# Patient Record
Sex: FEMALE | Race: WHITE | Employment: FULL TIME | URBAN - METROPOLITAN AREA
[De-identification: names, ages, dates, MRNs, and addresses within clinical notes are randomized per-mention and may not be internally consistent; named-entity substitution may affect disease eponyms.]

---

## 2017-06-18 PROBLEM — K21.9 GASTROESOPHAGEAL REFLUX DISEASE WITHOUT ESOPHAGITIS: Status: ACTIVE | Noted: 2017-06-18

## 2017-06-18 PROBLEM — E78.2 MIXED HYPERLIPIDEMIA: Status: ACTIVE | Noted: 2017-06-18

## 2017-06-18 PROBLEM — R53.83 FATIGUE: Status: ACTIVE | Noted: 2017-06-18

## 2018-03-06 PROBLEM — L98.9 NON-HEALING SKIN LESION OF NOSE: Status: ACTIVE | Noted: 2018-03-06

## 2018-03-19 ENCOUNTER — TELEPHONE (OUTPATIENT)
Dept: FAMILY MEDICINE CLINIC | Age: 53
End: 2018-03-19

## 2018-03-27 ENCOUNTER — OFFICE VISIT (OUTPATIENT)
Dept: FAMILY MEDICINE CLINIC | Age: 53
End: 2018-03-27
Payer: COMMERCIAL

## 2018-03-27 VITALS
BODY MASS INDEX: 32.65 KG/M2 | TEMPERATURE: 99.7 F | RESPIRATION RATE: 18 BRPM | SYSTOLIC BLOOD PRESSURE: 118 MMHG | HEIGHT: 65 IN | WEIGHT: 196 LBS | OXYGEN SATURATION: 100 % | HEART RATE: 66 BPM | DIASTOLIC BLOOD PRESSURE: 80 MMHG

## 2018-03-27 DIAGNOSIS — J11.1 FLU SYNDROME: ICD-10-CM

## 2018-03-27 DIAGNOSIS — E78.5 DYSLIPIDEMIA: ICD-10-CM

## 2018-03-27 DIAGNOSIS — Z12.31 SCREENING MAMMOGRAM, ENCOUNTER FOR: ICD-10-CM

## 2018-03-27 DIAGNOSIS — R53.83 FATIGUE, UNSPECIFIED TYPE: ICD-10-CM

## 2018-03-27 DIAGNOSIS — R73.01 IFG (IMPAIRED FASTING GLUCOSE): ICD-10-CM

## 2018-03-27 DIAGNOSIS — J40 BRONCHITIS: Primary | ICD-10-CM

## 2018-03-27 PROCEDURE — 99213 OFFICE O/P EST LOW 20 MIN: CPT | Performed by: FAMILY MEDICINE

## 2018-03-27 PROCEDURE — 96372 THER/PROPH/DIAG INJ SC/IM: CPT | Performed by: FAMILY MEDICINE

## 2018-03-27 RX ORDER — GUAIFENESIN 600 MG/1
600 TABLET, EXTENDED RELEASE ORAL 2 TIMES DAILY
Qty: 100 TABLET | Refills: 3 | Status: ON HOLD | OUTPATIENT
Start: 2018-03-27 | End: 2018-04-11 | Stop reason: HOSPADM

## 2018-03-27 RX ORDER — METHYLPREDNISOLONE 4 MG/1
TABLET ORAL
Qty: 1 KIT | Refills: 0 | Status: SHIPPED | OUTPATIENT
Start: 2018-03-27 | End: 2018-04-02

## 2018-03-27 RX ORDER — FLUTICASONE PROPIONATE 50 MCG
1 SPRAY, SUSPENSION (ML) NASAL DAILY
Qty: 1 BOTTLE | Refills: 3 | Status: SHIPPED | OUTPATIENT
Start: 2018-03-27 | End: 2018-12-04 | Stop reason: ALTCHOICE

## 2018-03-27 RX ORDER — DEXAMETHASONE SODIUM PHOSPHATE 4 MG/ML
4 INJECTION, SOLUTION INTRA-ARTICULAR; INTRALESIONAL; INTRAMUSCULAR; INTRAVENOUS; SOFT TISSUE ONCE
Status: COMPLETED | OUTPATIENT
Start: 2018-03-27 | End: 2018-03-27

## 2018-03-27 RX ADMIN — DEXAMETHASONE SODIUM PHOSPHATE 4 MG: 4 INJECTION, SOLUTION INTRA-ARTICULAR; INTRALESIONAL; INTRAMUSCULAR; INTRAVENOUS; SOFT TISSUE at 14:01

## 2018-03-27 ASSESSMENT — ENCOUNTER SYMPTOMS
EYES NEGATIVE: 1
CONSTIPATION: 0
BACK PAIN: 1
EYE PAIN: 0
HEARTBURN: 0
BLURRED VISION: 0
ORTHOPNEA: 0
SHORTNESS OF BREATH: 0
STRIDOR: 0
SPUTUM PRODUCTION: 0
PHOTOPHOBIA: 0
BLOOD IN STOOL: 0
HEMOPTYSIS: 0
EYE REDNESS: 0
DOUBLE VISION: 0
EYE DISCHARGE: 0
GASTROINTESTINAL NEGATIVE: 1

## 2018-03-27 NOTE — PROGRESS NOTES
Dao Stern is a 45 y.o. female. HPI/Chief C/O:  Chief Complaint   Patient presents with    URI     Pt c/o sinus pressure, headaches, body aches, chills, fatigue, denies cough     Allergies   Allergen Reactions    Penicillins Anaphylaxis    Dilaudid [Hydromorphone Hcl] Nausea And Vomiting    Demerol Hcl [Meperidine] Hives    Morphine      Can take alone, not with demerol    Tylox [Oxycodone-Acetaminophen] Hives    Sulfa Antibiotics Rash    Tape Daphnie Maya Tape] Rash   She is here with a severe sinus and headache issue      Sinusitis   This is a new problem. The current episode started in the past 7 days. The problem has been gradually worsening since onset. Associated symptoms include congestion, coughing, ear pain, sinus pressure, sneezing and a sore throat. Pertinent negatives include no chills, diaphoresis, headaches, hoarse voice, neck pain, shortness of breath or swollen glands. Knee Pain    The incident occurred more than 1 week ago. The pain is present in the left knee and right knee. The quality of the pain is described as shooting and stabbing. The pain is at a severity of 9/10. The pain is severe. The pain has been worsening since onset. Associated symptoms include a loss of motion, muscle weakness, numbness and tingling. Pertinent negatives include no inability to bear weight or loss of sensation. Back Pain   This is a chronic problem. The current episode started more than 1 year ago. The problem occurs constantly. The problem has been gradually worsening since onset. The pain is present in the lumbar spine. The quality of the pain is described as burning and aching. The pain is at a severity of 8/10. The pain is moderate. The pain is the same all the time. Stiffness is present all day. Associated symptoms include numbness, tingling and weakness. Pertinent negatives include no fever, headaches, leg pain, perianal numbness or weight loss.          ROS:  Review of Systems Constitutional: Positive for malaise/fatigue. Negative for chills, diaphoresis, fever and weight loss. HENT: Positive for congestion, ear pain, sinus pressure, sneezing and sore throat. Negative for ear discharge, hearing loss, hoarse voice, nosebleeds and tinnitus. Eyes: Negative. Negative for blurred vision, double vision, photophobia, pain, discharge and redness. Respiratory: Positive for cough. Negative for hemoptysis, sputum production, shortness of breath and stridor. Cardiovascular: Negative. Negative for palpitations, orthopnea, claudication, leg swelling and PND. Gastrointestinal: Negative. Negative for blood in stool, constipation, heartburn and melena. Genitourinary: Negative. Negative for flank pain, frequency, hematuria and urgency. Musculoskeletal: Positive for back pain and myalgias. Negative for falls and neck pain. Skin: Negative for itching. Lesion on her nose that will not go away    Neurological: Positive for tingling, weakness and numbness. Negative for dizziness, tremors, sensory change, speech change, focal weakness, seizures, loss of consciousness and headaches. Endo/Heme/Allergies: Negative. Negative for environmental allergies and polydipsia. Does not bruise/bleed easily. Psychiatric/Behavioral: Positive for depression. Negative for hallucinations, memory loss, substance abuse and suicidal ideas. The patient is not nervous/anxious and does not have insomnia.       Past Medical/Surgical Hx;  Reviewed with patient      Diagnosis Date    Arthritis     knee    Asthma     Cancer ([facility] [address] 07/2007    ovarian    H/O cardiovascular stress test 06/13/2017    lexiscan    Hiatal hernia with gastroesophageal reflux     Hx of cardiovascular stress test 10/2012    lexiscan nuclear stress    Hyperlipidemia     Hypoactive thyroid     no meds    Osteoarthritis     PONV (postoperative nausea and vomiting)     Preoperative clearance 01/09/2017    Medical clearance from Dr. Barajas  in EPIC notes    Ulcer of gastroesophageal junction      Past Surgical History:   Procedure Laterality Date    CHOLECYSTECTOMY      COLONOSCOPY      FRACTURE SURGERY Right 2005    humerus, hardware in place   [address]  7/18/07    and bso    KNEE ARTHROPLASTY Right 02/02/2017    KNEE ARTHROSCOPY  right    NERVE BLOCK N/A 5 15 13    lumb ep #1    NERVE BLOCK N/A 5/29/13    lumbar epidural nerve block #2    NERVE BLOCK  06 12 2013    lumbar epidural #3    TOTAL KNEE ARTHROPLASTY Right     TUBAL LIGATION      UPPER GASTROINTESTINAL ENDOSCOPY         Past Family Hx:  Reviewed with patient      Problem Relation Age of Onset    Emphysema Mother     Migraines Mother     Cancer Mother     Arthritis Other     Cancer Other     Heart Disease Other        Social Hx:  Reviewed with patient  Social History   Substance Use Topics    Smoking status: Former Smoker    Smokeless tobacco: Never Used      Comment: quit many years ago    Alcohol use 1.2 oz/week     2 Glasses of wine per week      Comment: social       OBJECTIVE  /80   Pulse 66   Temp 99.7 °F (37.6 °C) (Oral)   Resp 18   Ht 5' 5\" (1.651 m)   Wt 196 lb (88.9 kg)   LMP  (LMP Unknown)   SpO2 100%   Breastfeeding? No   BMI 32.62 kg/m²     Problem List:  Bryan Montes  does not have any pertinent problems on file. PHYS EX:  Physical Exam   Constitutional: She is oriented to person, place, and time. She appears well-developed and well-nourished. No distress. HENT:   Head: Normocephalic and atraumatic. Right Ear: External ear normal.   Nose: Nose normal.   Mouth/Throat: Oropharynx is clear and moist. No oropharyngeal exudate. Dull left TM  Small red lesion to the bridge of her nose that will not go away   Sinus congestion    Eyes: Conjunctivae and EOM are normal. Pupils are equal, round, and reactive to light. Right eye exhibits no discharge.  Left eye directed  -     guaiFENesin (MUCINEX) 600 MG extended release tablet; Take 1 tablet by mouth 2 times daily  -  PLAN--aerosol accuneb 1.25 plus chest percussion--Rx    Screening mammogram, encounter for  -     [facility] DIGITAL SCREEN W CAD BILATERAL; Future    IFG (impaired fasting glucose)  -     Comprehensive Metabolic Panel; Future  -     CBC Auto Differential; Future  -     Hemoglobin A1C; Future    Dyslipidemia  -     Comprehensive Metabolic Panel; Future  -     Lipid Panel; Future  -     CBC Auto Differential; Future    Fatigue, unspecified type  -     TSH without Reflex; Future    Flu syndrome  -     dexamethasone (DECADRON) injection 4 mg; Inject 1 mL into the muscle once  -     methylPREDNISolone (MEDROL DOSEPACK) 4 MG tablet; Take as directed  -     fluticasone (FLONASE) 50 MCG/ACT nasal spray; 1 spray by Nasal route daily  -     guaiFENesin (MUCINEX) 600 MG extended release tablet; Take 1 tablet by mouth 2 times daily        Outpatient Encounter Prescriptions as of 3/27/2018   Medication Sig Dispense Refill    methylPREDNISolone (MEDROL DOSEPACK) 4 MG tablet Take as directed 1 kit 0    fluticasone (FLONASE) 50 MCG/ACT nasal spray 1 spray by Nasal route daily 1 Bottle 3    guaiFENesin (MUCINEX) 600 MG extended release tablet Take 1 tablet by mouth 2 times daily 100 tablet 3    HYDROcodone-acetaminophen (NORCO)  MG per tablet Take 1 tablet by mouth 2 times daily as needed for Pain for up to 30 days.  Earliest Fill Date: 3/21/18 60 tablet 0    meloxicam (MOBIC) 7.5 MG tablet Take 1 tablet by mouth 2 times daily 60 tablet 5    pantoprazole (PROTONIX) 40 MG tablet TAKE 1 TABLET BY MOUTH EVERY DAY 90 tablet 1    rosuvastatin (CRESTOR) 5 MG tablet TAKE 1 TABLET BY MOUTH EVERY DAY 90 tablet 1    montelukast (SINGULAIR) 10 MG tablet TAKE 1 TABLET BY MOUTH EVERY DAY 90 tablet 1    guaiFENesin (MUCINEX) 600 MG extended release tablet Take 1 tablet by mouth 2 times daily 100 tablet 3    metoclopramide

## 2018-03-27 NOTE — PATIENT INSTRUCTIONS
Patient Education        Saline Nasal Washes: Care Instructions  Your Care Instructions  Saline nasal washes help keep the nasal passages open by washing out thick or dried mucus. This simple remedy can help relieve symptoms of allergies, sinusitis, and colds. It also can make the nose feel more comfortable by keeping the mucous membranes moist. You may notice a little burning sensation in your nose the first few times you use the solution, but this usually gets better in a few days. Follow-up care is a key part of your treatment and safety. Be sure to make and go to all appointments, and call your doctor if you are having problems. It's also a good idea to know your test results and keep a list of the medicines you take. How can you care for yourself at home? · You can buy premixed saline solution in a squeeze bottle or other sinus rinse products at a drugstore. Read and follow the instructions on the label. · You also can make your own saline solution by adding 1 teaspoon of salt and 1 teaspoon of baking soda to 2 cups of distilled water. · If you use a homemade solution, pour a small amount into a clean bowl. Using a rubber bulb syringe, squeeze the syringe and place the tip in the salt water. Pull a small amount of the salt water into the syringe by relaxing your hand. · Sit down with your head tilted slightly back. Do not lie down. Put the tip of the bulb syringe or the squeeze bottle a little way into one of your nostrils. Gently drip or squirt a few drops into the nostril. Repeat with the other nostril. Some sneezing and gagging are normal at first.  · Gently blow your nose. · Wipe the syringe or bottle tip clean after each use. · Repeat this 2 or 3 times a day. · Use nasal washes gently if you have nosebleeds often. When should you call for help? Watch closely for changes in your health, and be sure to contact your doctor if:  ? · You often get nosebleeds.    ? · You have problems doing the nasal washes. Where can you learn more? Go to [email or web address]://[email or web address].[email or web address]. org and sign in to your [employer] account. Enter [phone] in the [facility] box to learn more about \"Saline Nasal Washes: Care Instructions. \"     If you do not have an account, please click on the \"Sign Up Now\" link. Current as of: May 12, 2017  Content Version: 11.5  © [id number] [employer]. Care instructions adapted under license by [facility] ([facility]). If you have questions about a medical condition or this instruction, always ask your healthcare professional. [address] any warranty or liability for your use of this information. Patient Education        Sinusitis: Care Instructions  Your Care Instructions    Sinusitis is an infection of the lining of the sinus cavities in your head. Sinusitis often follows a cold. It causes pain and pressure in your head and face. In most cases, sinusitis gets better on its own in 1 to 2 weeks. But some mild symptoms may last for several weeks. Sometimes antibiotics are needed. Follow-up care is a key part of your treatment and safety. Be sure to make and go to all appointments, and call your doctor if you are having problems. It's also a good idea to know your test results and keep a list of the medicines you take. How can you care for yourself at home? · Take an over-the-counter pain medicine, such as acetaminophen (Tylenol), ibuprofen (Advil, Motrin), or naproxen (Aleve). Read and follow all instructions on the label. · If the doctor prescribed antibiotics, take them as directed. Do not stop taking them just because you feel better. You need to take the full course of antibiotics. · Be careful when taking over-the-counter cold or flu medicines and Tylenol at the same time. Many of these medicines have acetaminophen, which is Tylenol. Read the labels to make sure that you are not taking more than the recommended dose.  Too much acetaminophen

## 2018-03-28 ASSESSMENT — ENCOUNTER SYMPTOMS
SWOLLEN GLANDS: 0
SINUS PRESSURE: 1
SORE THROAT: 1
COUGH: 1
HOARSE VOICE: 0

## 2018-04-09 ENCOUNTER — APPOINTMENT (OUTPATIENT)
Dept: ULTRASOUND IMAGING | Age: 53
End: 2018-04-09
Payer: COMMERCIAL

## 2018-04-09 ENCOUNTER — APPOINTMENT (OUTPATIENT)
Dept: CT IMAGING | Age: 53
End: 2018-04-09
Payer: COMMERCIAL

## 2018-04-09 ENCOUNTER — HOSPITAL ENCOUNTER (OUTPATIENT)
Age: 53
Setting detail: OBSERVATION
Discharge: HOME | End: 2018-04-11
Attending: EMERGENCY MEDICINE | Admitting: INTERNAL MEDICINE
Payer: COMMERCIAL

## 2018-04-09 ENCOUNTER — APPOINTMENT (OUTPATIENT)
Dept: GENERAL RADIOLOGY | Age: 53
End: 2018-04-09
Payer: COMMERCIAL

## 2018-04-09 DIAGNOSIS — R07.9 CHEST PAIN, UNSPECIFIED TYPE: Primary | ICD-10-CM

## 2018-04-09 LAB
ALBUMIN SERPL-MCNC: 4.3 G/DL (ref 3.5–5.2)
ALP BLD-CCNC: 66 U/L (ref 35–104)
ALT SERPL-CCNC: 8 U/L (ref 0–32)
ANION GAP SERPL CALCULATED.3IONS-SCNC: 11 MMOL/L (ref 7–16)
AST SERPL-CCNC: 17 U/L (ref 0–31)
BASOPHILS ABSOLUTE: 0.02 E9/L (ref 0–0.2)
BASOPHILS RELATIVE PERCENT: 0.6 % (ref 0–2)
BILIRUB SERPL-MCNC: 0.4 MG/DL (ref 0–1.2)
BUN BLDV-MCNC: 9 MG/DL (ref 6–20)
CALCIUM SERPL-MCNC: 8.8 MG/DL (ref 8.6–10.2)
CHLORIDE BLD-SCNC: 101 MMOL/L (ref 98–107)
CO2: 29 MMOL/L (ref 22–29)
CREAT SERPL-MCNC: 1 MG/DL (ref 0.5–1)
D DIMER: 258 NG/ML DDU
EOSINOPHILS ABSOLUTE: 0.03 E9/L (ref 0.05–0.5)
EOSINOPHILS RELATIVE PERCENT: 0.8 % (ref 0–6)
GFR AFRICAN AMERICAN: >60
GFR NON-AFRICAN AMERICAN: 58 ML/MIN/1.73
GLUCOSE BLD-MCNC: 87 MG/DL (ref 74–109)
HCT VFR BLD CALC: 33.2 % (ref 34–48)
HEMOGLOBIN: 11.4 G/DL (ref 11.5–15.5)
IMMATURE GRANULOCYTES #: 0.01 E9/L
IMMATURE GRANULOCYTES %: 0.3 % (ref 0–5)
LYMPHOCYTES ABSOLUTE: 1.29 E9/L (ref 1.5–4)
LYMPHOCYTES RELATIVE PERCENT: 36.3 % (ref 20–42)
MCH RBC QN AUTO: 31 PG (ref 26–35)
MCHC RBC AUTO-ENTMCNC: 34.3 % (ref 32–34.5)
MCV RBC AUTO: 90.2 FL (ref 80–99.9)
MONOCYTES ABSOLUTE: 0.33 E9/L (ref 0.1–0.95)
MONOCYTES RELATIVE PERCENT: 9.3 % (ref 2–12)
NEUTROPHILS ABSOLUTE: 1.87 E9/L (ref 1.8–7.3)
NEUTROPHILS RELATIVE PERCENT: 52.7 % (ref 43–80)
PDW BLD-RTO: 13 FL (ref 11.5–15)
PLATELET # BLD: 148 E9/L (ref 130–450)
PMV BLD AUTO: 11.5 FL (ref 7–12)
POTASSIUM SERPL-SCNC: 4.1 MMOL/L (ref 3.5–5)
RBC # BLD: 3.68 E12/L (ref 3.5–5.5)
SODIUM BLD-SCNC: 141 MMOL/L (ref 132–146)
TOTAL PROTEIN: 6.9 G/DL (ref 6.4–8.3)
TROPONIN: <0.01 NG/ML (ref 0–0.03)
TROPONIN: <0.01 NG/ML (ref 0–0.03)
WBC # BLD: 3.6 E9/L (ref 4.5–11.5)

## 2018-04-09 PROCEDURE — 6370000000 HC RX 637 (ALT 250 FOR IP): Performed by: INTERNAL MEDICINE

## 2018-04-09 PROCEDURE — G0378 HOSPITAL OBSERVATION PER HR: HCPCS

## 2018-04-09 PROCEDURE — 84484 ASSAY OF TROPONIN QUANT: CPT

## 2018-04-09 PROCEDURE — 6360000004 HC RX CONTRAST MEDICATION: Performed by: RADIOLOGY

## 2018-04-09 PROCEDURE — 71275 CT ANGIOGRAPHY CHEST: CPT

## 2018-04-09 PROCEDURE — 93971 EXTREMITY STUDY: CPT

## 2018-04-09 PROCEDURE — 71045 X-RAY EXAM CHEST 1 VIEW: CPT

## 2018-04-09 PROCEDURE — 6370000000 HC RX 637 (ALT 250 FOR IP): Performed by: PHYSICIAN ASSISTANT

## 2018-04-09 PROCEDURE — 85025 COMPLETE CBC W/AUTO DIFF WBC: CPT

## 2018-04-09 PROCEDURE — 99285 EMERGENCY DEPT VISIT HI MDM: CPT

## 2018-04-09 PROCEDURE — 2580000003 HC RX 258: Performed by: PHYSICIAN ASSISTANT

## 2018-04-09 PROCEDURE — 93005 ELECTROCARDIOGRAM TRACING: CPT | Performed by: PHYSICIAN ASSISTANT

## 2018-04-09 PROCEDURE — 85378 FIBRIN DEGRADE SEMIQUANT: CPT

## 2018-04-09 PROCEDURE — 36415 COLL VENOUS BLD VENIPUNCTURE: CPT

## 2018-04-09 PROCEDURE — 80053 COMPREHEN METABOLIC PANEL: CPT

## 2018-04-09 RX ORDER — ASPIRIN 81 MG/1
81 TABLET, CHEWABLE ORAL 2 TIMES DAILY
Status: DISCONTINUED | OUTPATIENT
Start: 2018-04-09 | End: 2018-04-11 | Stop reason: HOSPADM

## 2018-04-09 RX ORDER — DOCUSATE SODIUM 100 MG/1
200 CAPSULE, LIQUID FILLED ORAL 2 TIMES DAILY
Status: DISCONTINUED | OUTPATIENT
Start: 2018-04-09 | End: 2018-04-11 | Stop reason: HOSPADM

## 2018-04-09 RX ORDER — PANTOPRAZOLE SODIUM 40 MG/1
40 TABLET, DELAYED RELEASE ORAL DAILY
Status: DISCONTINUED | OUTPATIENT
Start: 2018-04-10 | End: 2018-04-11 | Stop reason: HOSPADM

## 2018-04-09 RX ORDER — ASPIRIN 81 MG/1
243 TABLET, CHEWABLE ORAL ONCE
Status: COMPLETED | OUTPATIENT
Start: 2018-04-09 | End: 2018-04-09

## 2018-04-09 RX ORDER — METOCLOPRAMIDE 10 MG/1
10 TABLET ORAL DAILY
Status: DISCONTINUED | OUTPATIENT
Start: 2018-04-10 | End: 2018-04-11 | Stop reason: HOSPADM

## 2018-04-09 RX ORDER — TRAMADOL HYDROCHLORIDE 50 MG/1
100 TABLET ORAL 2 TIMES DAILY
Status: DISCONTINUED | OUTPATIENT
Start: 2018-04-09 | End: 2018-04-11 | Stop reason: HOSPADM

## 2018-04-09 RX ORDER — ATORVASTATIN CALCIUM 20 MG/1
20 TABLET, FILM COATED ORAL NIGHTLY
Status: DISCONTINUED | OUTPATIENT
Start: 2018-04-09 | End: 2018-04-11 | Stop reason: HOSPADM

## 2018-04-09 RX ORDER — GABAPENTIN 100 MG/1
200 CAPSULE ORAL NIGHTLY
Status: DISCONTINUED | OUTPATIENT
Start: 2018-04-09 | End: 2018-04-11 | Stop reason: HOSPADM

## 2018-04-09 RX ORDER — HYDROCODONE BITARTRATE AND ACETAMINOPHEN 10; 325 MG/1; MG/1
2 TABLET ORAL NIGHTLY
Status: DISCONTINUED | OUTPATIENT
Start: 2018-04-09 | End: 2018-04-11 | Stop reason: HOSPADM

## 2018-04-09 RX ORDER — ONDANSETRON 2 MG/ML
4 INJECTION INTRAMUSCULAR; INTRAVENOUS EVERY 6 HOURS PRN
Status: DISCONTINUED | OUTPATIENT
Start: 2018-04-09 | End: 2018-04-11 | Stop reason: HOSPADM

## 2018-04-09 RX ORDER — 0.9 % SODIUM CHLORIDE 0.9 %
500 INTRAVENOUS SOLUTION INTRAVENOUS ONCE
Status: COMPLETED | OUTPATIENT
Start: 2018-04-09 | End: 2018-04-09

## 2018-04-09 RX ADMIN — DOCUSATE SODIUM 200 MG: 100 CAPSULE, LIQUID FILLED ORAL at 21:41

## 2018-04-09 RX ADMIN — HYDROCODONE BITARTRATE AND ACETAMINOPHEN 2 TABLET: 10; 325 TABLET ORAL at 21:40

## 2018-04-09 RX ADMIN — SODIUM CHLORIDE 500 ML: 9 INJECTION, SOLUTION INTRAVENOUS at 15:25

## 2018-04-09 RX ADMIN — GABAPENTIN 200 MG: 100 CAPSULE ORAL at 21:41

## 2018-04-09 RX ADMIN — ASPIRIN 81 MG 81 MG: 81 TABLET ORAL at 21:42

## 2018-04-09 RX ADMIN — ASPIRIN 81 MG 243 MG: 81 TABLET ORAL at 14:40

## 2018-04-09 RX ADMIN — IOPAMIDOL 80 ML: 755 INJECTION, SOLUTION INTRAVENOUS at 15:56

## 2018-04-09 RX ADMIN — TRAMADOL HYDROCHLORIDE 100 MG: 50 TABLET, FILM COATED ORAL at 21:41

## 2018-04-09 RX ADMIN — NITROGLYCERIN 0.5 INCH: 20 OINTMENT TOPICAL at 15:25

## 2018-04-09 ASSESSMENT — PAIN DESCRIPTION - FREQUENCY
FREQUENCY: CONTINUOUS
FREQUENCY: CONTINUOUS

## 2018-04-09 ASSESSMENT — PAIN DESCRIPTION - LOCATION
LOCATION: BACK;LEG
LOCATION: CHEST

## 2018-04-09 ASSESSMENT — PAIN DESCRIPTION - PAIN TYPE
TYPE: CHRONIC PAIN
TYPE: ACUTE PAIN

## 2018-04-09 ASSESSMENT — PAIN DESCRIPTION - ORIENTATION: ORIENTATION: LOWER

## 2018-04-09 ASSESSMENT — PAIN SCALES - GENERAL
PAINLEVEL_OUTOF10: 6
PAINLEVEL_OUTOF10: 0
PAINLEVEL_OUTOF10: 6

## 2018-04-09 ASSESSMENT — PAIN DESCRIPTION - DESCRIPTORS
DESCRIPTORS: ACHING;DISCOMFORT;CONSTANT
DESCRIPTORS: PRESSURE

## 2018-04-10 LAB
ANION GAP SERPL CALCULATED.3IONS-SCNC: 11 MMOL/L (ref 7–16)
BASOPHILS ABSOLUTE: 0.03 E9/L (ref 0–0.2)
BASOPHILS RELATIVE PERCENT: 0.7 % (ref 0–2)
BUN BLDV-MCNC: 11 MG/DL (ref 6–20)
CALCIUM SERPL-MCNC: 8.9 MG/DL (ref 8.6–10.2)
CHLORIDE BLD-SCNC: 104 MMOL/L (ref 98–107)
CHOLESTEROL, TOTAL: 154 MG/DL (ref 0–199)
CO2: 28 MMOL/L (ref 22–29)
CREAT SERPL-MCNC: 1 MG/DL (ref 0.5–1)
EOSINOPHILS ABSOLUTE: 0.07 E9/L (ref 0.05–0.5)
EOSINOPHILS RELATIVE PERCENT: 1.7 % (ref 0–6)
GFR AFRICAN AMERICAN: >60
GFR NON-AFRICAN AMERICAN: 58 ML/MIN/1.73
GLUCOSE BLD-MCNC: 99 MG/DL (ref 74–109)
HCT VFR BLD CALC: 35.6 % (ref 34–48)
HDLC SERPL-MCNC: 66 MG/DL
HEMOGLOBIN: 11.9 G/DL (ref 11.5–15.5)
IMMATURE GRANULOCYTES #: 0.01 E9/L
IMMATURE GRANULOCYTES %: 0.2 % (ref 0–5)
LDL CHOLESTEROL CALCULATED: 67 MG/DL (ref 0–99)
LYMPHOCYTES ABSOLUTE: 1.26 E9/L (ref 1.5–4)
LYMPHOCYTES RELATIVE PERCENT: 31.1 % (ref 20–42)
MAGNESIUM: 2.4 MG/DL (ref 1.6–2.6)
MCH RBC QN AUTO: 30.9 PG (ref 26–35)
MCHC RBC AUTO-ENTMCNC: 33.4 % (ref 32–34.5)
MCV RBC AUTO: 92.5 FL (ref 80–99.9)
MONOCYTES ABSOLUTE: 0.39 E9/L (ref 0.1–0.95)
MONOCYTES RELATIVE PERCENT: 9.6 % (ref 2–12)
NEUTROPHILS ABSOLUTE: 2.29 E9/L (ref 1.8–7.3)
NEUTROPHILS RELATIVE PERCENT: 56.7 % (ref 43–80)
PDW BLD-RTO: 12.9 FL (ref 11.5–15)
PHOSPHORUS: 4.1 MG/DL (ref 2.5–4.5)
PLATELET # BLD: 163 E9/L (ref 130–450)
PMV BLD AUTO: 11.9 FL (ref 7–12)
POTASSIUM SERPL-SCNC: 4.3 MMOL/L (ref 3.5–5)
RBC # BLD: 3.85 E12/L (ref 3.5–5.5)
SODIUM BLD-SCNC: 143 MMOL/L (ref 132–146)
TRIGL SERPL-MCNC: 107 MG/DL (ref 0–149)
TROPONIN: <0.01 NG/ML (ref 0–0.03)
VLDLC SERPL CALC-MCNC: 21 MG/DL
WBC # BLD: 4.1 E9/L (ref 4.5–11.5)

## 2018-04-10 PROCEDURE — 6360000002 HC RX W HCPCS: Performed by: INTERNAL MEDICINE

## 2018-04-10 PROCEDURE — 83735 ASSAY OF MAGNESIUM: CPT

## 2018-04-10 PROCEDURE — 84100 ASSAY OF PHOSPHORUS: CPT

## 2018-04-10 PROCEDURE — 80061 LIPID PANEL: CPT

## 2018-04-10 PROCEDURE — 6370000000 HC RX 637 (ALT 250 FOR IP): Performed by: INTERNAL MEDICINE

## 2018-04-10 PROCEDURE — G0378 HOSPITAL OBSERVATION PER HR: HCPCS

## 2018-04-10 PROCEDURE — 96372 THER/PROPH/DIAG INJ SC/IM: CPT

## 2018-04-10 PROCEDURE — 84484 ASSAY OF TROPONIN QUANT: CPT

## 2018-04-10 PROCEDURE — 80048 BASIC METABOLIC PNL TOTAL CA: CPT

## 2018-04-10 PROCEDURE — 85025 COMPLETE CBC W/AUTO DIFF WBC: CPT

## 2018-04-10 PROCEDURE — 93005 ELECTROCARDIOGRAM TRACING: CPT | Performed by: INTERNAL MEDICINE

## 2018-04-10 PROCEDURE — 36415 COLL VENOUS BLD VENIPUNCTURE: CPT

## 2018-04-10 RX ORDER — ATENOLOL 25 MG/1
25 TABLET ORAL EVERY 8 HOURS
Status: DISPENSED | OUTPATIENT
Start: 2018-04-10 | End: 2018-04-11

## 2018-04-10 RX ADMIN — METOCLOPRAMIDE HYDROCHLORIDE 10 MG: 10 TABLET ORAL at 11:09

## 2018-04-10 RX ADMIN — DOCUSATE SODIUM 200 MG: 100 CAPSULE, LIQUID FILLED ORAL at 11:10

## 2018-04-10 RX ADMIN — ASPIRIN 81 MG 81 MG: 81 TABLET ORAL at 21:20

## 2018-04-10 RX ADMIN — DOCUSATE SODIUM 200 MG: 100 CAPSULE, LIQUID FILLED ORAL at 21:21

## 2018-04-10 RX ADMIN — HYDROCODONE BITARTRATE AND ACETAMINOPHEN 2 TABLET: 10; 325 TABLET ORAL at 21:20

## 2018-04-10 RX ADMIN — ATENOLOL 25 MG: 25 TABLET ORAL at 22:58

## 2018-04-10 RX ADMIN — ASPIRIN 81 MG 81 MG: 81 TABLET ORAL at 11:11

## 2018-04-10 RX ADMIN — GABAPENTIN 200 MG: 100 CAPSULE ORAL at 21:20

## 2018-04-10 RX ADMIN — PANTOPRAZOLE SODIUM 40 MG: 40 TABLET, DELAYED RELEASE ORAL at 11:08

## 2018-04-10 RX ADMIN — ATORVASTATIN CALCIUM 20 MG: 20 TABLET, FILM COATED ORAL at 21:20

## 2018-04-10 RX ADMIN — TRAMADOL HYDROCHLORIDE 100 MG: 50 TABLET, FILM COATED ORAL at 21:20

## 2018-04-10 RX ADMIN — ENOXAPARIN SODIUM 40 MG: 40 INJECTION SUBCUTANEOUS at 11:09

## 2018-04-10 RX ADMIN — TRAMADOL HYDROCHLORIDE 100 MG: 50 TABLET, FILM COATED ORAL at 11:07

## 2018-04-10 ASSESSMENT — PAIN DESCRIPTION - DESCRIPTORS: DESCRIPTORS: ACHING;DISCOMFORT;DULL

## 2018-04-10 ASSESSMENT — PAIN SCALES - GENERAL
PAINLEVEL_OUTOF10: 0
PAINLEVEL_OUTOF10: 6
PAINLEVEL_OUTOF10: 4
PAINLEVEL_OUTOF10: 0

## 2018-04-10 ASSESSMENT — ENCOUNTER SYMPTOMS
DIARRHEA: 0
SHORTNESS OF BREATH: 0
NAUSEA: 0
VOMITING: 0
COUGH: 0

## 2018-04-10 ASSESSMENT — PAIN DESCRIPTION - LOCATION: LOCATION: BACK

## 2018-04-10 ASSESSMENT — PAIN DESCRIPTION - ORIENTATION: ORIENTATION: LOWER

## 2018-04-10 ASSESSMENT — PAIN DESCRIPTION - ONSET: ONSET: GRADUAL

## 2018-04-11 ENCOUNTER — APPOINTMENT (OUTPATIENT)
Dept: CT IMAGING | Age: 53
End: 2018-04-11
Payer: COMMERCIAL

## 2018-04-11 VITALS
WEIGHT: 186.7 LBS | RESPIRATION RATE: 16 BRPM | TEMPERATURE: 97.6 F | HEART RATE: 60 BPM | DIASTOLIC BLOOD PRESSURE: 52 MMHG | HEIGHT: 65 IN | OXYGEN SATURATION: 96 % | BODY MASS INDEX: 31.11 KG/M2 | SYSTOLIC BLOOD PRESSURE: 102 MMHG

## 2018-04-11 LAB
ANION GAP SERPL CALCULATED.3IONS-SCNC: 10 MMOL/L (ref 7–16)
BASOPHILS ABSOLUTE: 0.02 E9/L (ref 0–0.2)
BASOPHILS RELATIVE PERCENT: 0.5 % (ref 0–2)
BUN BLDV-MCNC: 14 MG/DL (ref 6–20)
CALCIUM SERPL-MCNC: 8.9 MG/DL (ref 8.6–10.2)
CHLORIDE BLD-SCNC: 102 MMOL/L (ref 98–107)
CO2: 28 MMOL/L (ref 22–29)
CREAT SERPL-MCNC: 0.9 MG/DL (ref 0.5–1)
EOSINOPHILS ABSOLUTE: 0.08 E9/L (ref 0.05–0.5)
EOSINOPHILS RELATIVE PERCENT: 2.1 % (ref 0–6)
GFR AFRICAN AMERICAN: >60
GFR NON-AFRICAN AMERICAN: >60 ML/MIN/1.73
GLUCOSE BLD-MCNC: 96 MG/DL (ref 74–109)
HCT VFR BLD CALC: 36.1 % (ref 34–48)
HEMOGLOBIN: 12.1 G/DL (ref 11.5–15.5)
IMMATURE GRANULOCYTES #: 0.01 E9/L
IMMATURE GRANULOCYTES %: 0.3 % (ref 0–5)
LYMPHOCYTES ABSOLUTE: 1.34 E9/L (ref 1.5–4)
LYMPHOCYTES RELATIVE PERCENT: 35.6 % (ref 20–42)
MCH RBC QN AUTO: 30.7 PG (ref 26–35)
MCHC RBC AUTO-ENTMCNC: 33.5 % (ref 32–34.5)
MCV RBC AUTO: 91.6 FL (ref 80–99.9)
MONOCYTES ABSOLUTE: 0.38 E9/L (ref 0.1–0.95)
MONOCYTES RELATIVE PERCENT: 10.1 % (ref 2–12)
NEUTROPHILS ABSOLUTE: 1.93 E9/L (ref 1.8–7.3)
NEUTROPHILS RELATIVE PERCENT: 51.4 % (ref 43–80)
PDW BLD-RTO: 13 FL (ref 11.5–15)
PLATELET # BLD: 160 E9/L (ref 130–450)
PMV BLD AUTO: 12.2 FL (ref 7–12)
POTASSIUM SERPL-SCNC: 4.5 MMOL/L (ref 3.5–5)
RBC # BLD: 3.94 E12/L (ref 3.5–5.5)
SODIUM BLD-SCNC: 140 MMOL/L (ref 132–146)
WBC # BLD: 3.8 E9/L (ref 4.5–11.5)

## 2018-04-11 PROCEDURE — G0378 HOSPITAL OBSERVATION PER HR: HCPCS

## 2018-04-11 PROCEDURE — 6360000004 HC RX CONTRAST MEDICATION: Performed by: RADIOLOGY

## 2018-04-11 PROCEDURE — 80048 BASIC METABOLIC PNL TOTAL CA: CPT

## 2018-04-11 PROCEDURE — 85025 COMPLETE CBC W/AUTO DIFF WBC: CPT

## 2018-04-11 PROCEDURE — 36415 COLL VENOUS BLD VENIPUNCTURE: CPT

## 2018-04-11 PROCEDURE — 75574 CT ANGIO HRT W/3D IMAGE: CPT

## 2018-04-11 PROCEDURE — 6370000000 HC RX 637 (ALT 250 FOR IP): Performed by: INTERNAL MEDICINE

## 2018-04-11 RX ADMIN — METOCLOPRAMIDE HYDROCHLORIDE 10 MG: 10 TABLET ORAL at 13:40

## 2018-04-11 RX ADMIN — PANTOPRAZOLE SODIUM 40 MG: 40 TABLET, DELAYED RELEASE ORAL at 13:40

## 2018-04-11 RX ADMIN — IOPAMIDOL 100 ML: 755 INJECTION, SOLUTION INTRAVENOUS at 10:16

## 2018-04-11 RX ADMIN — ASPIRIN 81 MG 81 MG: 81 TABLET ORAL at 13:39

## 2018-04-11 RX ADMIN — TRAMADOL HYDROCHLORIDE 100 MG: 50 TABLET, FILM COATED ORAL at 13:41

## 2018-04-11 RX ADMIN — DOCUSATE SODIUM 200 MG: 100 CAPSULE, LIQUID FILLED ORAL at 13:39

## 2018-04-11 ASSESSMENT — PAIN DESCRIPTION - LOCATION: LOCATION: LEG

## 2018-04-11 ASSESSMENT — PAIN DESCRIPTION - DESCRIPTORS: DESCRIPTORS: ACHING;CONSTANT;DISCOMFORT

## 2018-04-11 ASSESSMENT — PAIN SCALES - GENERAL
PAINLEVEL_OUTOF10: 6
PAINLEVEL_OUTOF10: 4
PAINLEVEL_OUTOF10: 0

## 2018-04-11 ASSESSMENT — PAIN DESCRIPTION - ORIENTATION: ORIENTATION: RIGHT;LEFT

## 2018-04-11 ASSESSMENT — PAIN DESCRIPTION - PAIN TYPE: TYPE: CHRONIC PAIN

## 2018-04-12 ENCOUNTER — CARE COORDINATION (OUTPATIENT)
Dept: CASE MANAGEMENT | Age: 53
End: 2018-04-12

## 2018-04-12 DIAGNOSIS — R07.9 CHEST PAIN, UNSPECIFIED TYPE: Primary | ICD-10-CM

## 2018-04-12 LAB
EKG ATRIAL RATE: 76 BPM
EKG P AXIS: 62 DEGREES
EKG P-R INTERVAL: 162 MS
EKG Q-T INTERVAL: 384 MS
EKG QRS DURATION: 80 MS
EKG QTC CALCULATION (BAZETT): 432 MS
EKG R AXIS: 62 DEGREES
EKG T AXIS: 33 DEGREES
EKG VENTRICULAR RATE: 76 BPM

## 2018-04-12 PROCEDURE — 1111F DSCHRG MED/CURRENT MED MERGE: CPT | Performed by: FAMILY MEDICINE

## 2018-04-14 LAB
EKG ATRIAL RATE: 72 BPM
EKG P AXIS: 50 DEGREES
EKG P-R INTERVAL: 158 MS
EKG Q-T INTERVAL: 384 MS
EKG QRS DURATION: 80 MS
EKG QTC CALCULATION (BAZETT): 420 MS
EKG R AXIS: 48 DEGREES
EKG T AXIS: 9 DEGREES
EKG VENTRICULAR RATE: 72 BPM

## 2018-04-16 ENCOUNTER — OFFICE VISIT (OUTPATIENT)
Dept: FAMILY MEDICINE CLINIC | Age: 53
End: 2018-04-16
Payer: COMMERCIAL

## 2018-04-16 VITALS
HEART RATE: 74 BPM | HEIGHT: 65 IN | SYSTOLIC BLOOD PRESSURE: 126 MMHG | OXYGEN SATURATION: 98 % | BODY MASS INDEX: 30.56 KG/M2 | DIASTOLIC BLOOD PRESSURE: 78 MMHG | WEIGHT: 183.4 LBS

## 2018-04-16 DIAGNOSIS — K44.9 HIATAL HERNIA: Primary | ICD-10-CM

## 2018-04-16 DIAGNOSIS — M48.061 FORAMINAL STENOSIS OF LUMBAR REGION: ICD-10-CM

## 2018-04-16 DIAGNOSIS — K21.9 GASTROESOPHAGEAL REFLUX DISEASE WITHOUT ESOPHAGITIS: ICD-10-CM

## 2018-04-16 DIAGNOSIS — E78.5 HYPERLIPIDEMIA, UNSPECIFIED HYPERLIPIDEMIA TYPE: ICD-10-CM

## 2018-04-16 DIAGNOSIS — Z98.890 S/P LAMINECTOMY: ICD-10-CM

## 2018-04-16 PROCEDURE — 99213 OFFICE O/P EST LOW 20 MIN: CPT | Performed by: FAMILY MEDICINE

## 2018-04-16 ASSESSMENT — ENCOUNTER SYMPTOMS
BLURRED VISION: 0
STRIDOR: 0
DIARRHEA: 0
SINUS PAIN: 0
ABDOMINAL PAIN: 1
PHOTOPHOBIA: 0
WATER BRASH: 0
EYE DISCHARGE: 0
CHOKING: 0
HOARSE VOICE: 0
CONSTIPATION: 0
BACK PAIN: 1
EYES NEGATIVE: 1
GLOBUS SENSATION: 0
HEARTBURN: 0
DOUBLE VISION: 0
EYE REDNESS: 0
BELCHING: 0
WHEEZING: 0
NAUSEA: 0
SORE THROAT: 0
EYE PAIN: 0
BLOOD IN STOOL: 0
COUGH: 0

## 2018-04-16 ASSESSMENT — PATIENT HEALTH QUESTIONNAIRE - PHQ9
1. LITTLE INTEREST OR PLEASURE IN DOING THINGS: 1
SUM OF ALL RESPONSES TO PHQ9 QUESTIONS 1 & 2: 1
SUM OF ALL RESPONSES TO PHQ QUESTIONS 1-9: 1

## 2018-04-30 ENCOUNTER — CARE COORDINATION (OUTPATIENT)
Dept: CASE MANAGEMENT | Age: 53
End: 2018-04-30

## 2018-05-08 ENCOUNTER — TELEPHONE (OUTPATIENT)
Dept: FAMILY MEDICINE CLINIC | Age: 53
End: 2018-05-08

## 2018-06-01 ENCOUNTER — OFFICE VISIT (OUTPATIENT)
Dept: FAMILY MEDICINE CLINIC | Age: 53
End: 2018-06-01
Payer: COMMERCIAL

## 2018-06-01 VITALS
DIASTOLIC BLOOD PRESSURE: 76 MMHG | HEART RATE: 74 BPM | OXYGEN SATURATION: 98 % | BODY MASS INDEX: 30.49 KG/M2 | HEIGHT: 65 IN | WEIGHT: 183 LBS | SYSTOLIC BLOOD PRESSURE: 124 MMHG

## 2018-06-01 DIAGNOSIS — K44.9 HIATAL HERNIA WITH GASTROESOPHAGEAL REFLUX: ICD-10-CM

## 2018-06-01 DIAGNOSIS — K21.9 GASTROESOPHAGEAL REFLUX DISEASE WITHOUT ESOPHAGITIS: ICD-10-CM

## 2018-06-01 DIAGNOSIS — E78.5 HYPERLIPIDEMIA, UNSPECIFIED HYPERLIPIDEMIA TYPE: ICD-10-CM

## 2018-06-01 DIAGNOSIS — M48.061 NEURAL FORAMINAL STENOSIS OF LUMBAR SPINE: ICD-10-CM

## 2018-06-01 DIAGNOSIS — H66.002 ACUTE SUPPURATIVE OTITIS MEDIA OF LEFT EAR WITHOUT SPONTANEOUS RUPTURE OF TYMPANIC MEMBRANE, RECURRENCE NOT SPECIFIED: ICD-10-CM

## 2018-06-01 DIAGNOSIS — M51.36 DDD (DEGENERATIVE DISC DISEASE), LUMBAR: Primary | ICD-10-CM

## 2018-06-01 DIAGNOSIS — M17.0 PRIMARY OSTEOARTHRITIS OF BOTH KNEES: ICD-10-CM

## 2018-06-01 DIAGNOSIS — K21.9 HIATAL HERNIA WITH GASTROESOPHAGEAL REFLUX: ICD-10-CM

## 2018-06-01 DIAGNOSIS — M54.16 LUMBAR RADICULOPATHY: ICD-10-CM

## 2018-06-01 DIAGNOSIS — Z12.31 SCREENING MAMMOGRAM, ENCOUNTER FOR: ICD-10-CM

## 2018-06-01 PROCEDURE — 99214 OFFICE O/P EST MOD 30 MIN: CPT | Performed by: FAMILY MEDICINE

## 2018-06-01 RX ORDER — PANTOPRAZOLE SODIUM 40 MG/1
TABLET, DELAYED RELEASE ORAL
Qty: 90 TABLET | Refills: 1 | Status: SHIPPED | OUTPATIENT
Start: 2018-06-01 | End: 2019-03-06 | Stop reason: SDUPTHER

## 2018-06-01 RX ORDER — ROSUVASTATIN CALCIUM 5 MG/1
TABLET, COATED ORAL
Qty: 90 TABLET | Refills: 1 | Status: SHIPPED | OUTPATIENT
Start: 2018-06-01 | End: 2019-03-06 | Stop reason: SDUPTHER

## 2018-06-01 RX ORDER — METOCLOPRAMIDE 10 MG/1
TABLET ORAL
Qty: 90 TABLET | Refills: 1 | Status: SHIPPED | OUTPATIENT
Start: 2018-06-01 | End: 2019-03-06 | Stop reason: SDUPTHER

## 2018-06-01 RX ORDER — DULOXETIN HYDROCHLORIDE 30 MG/1
30 CAPSULE, DELAYED RELEASE ORAL DAILY
Qty: 30 CAPSULE | Refills: 3 | Status: SHIPPED | OUTPATIENT
Start: 2018-06-01 | End: 2018-12-04 | Stop reason: SDUPTHER

## 2018-06-01 RX ORDER — MONTELUKAST SODIUM 10 MG/1
TABLET ORAL
Qty: 90 TABLET | Refills: 1 | Status: SHIPPED
Start: 2018-06-01 | End: 2021-04-07 | Stop reason: SDUPTHER

## 2018-06-01 RX ORDER — GABAPENTIN 100 MG/1
200 CAPSULE ORAL NIGHTLY
Qty: 60 CAPSULE | Refills: 1 | Status: CANCELLED | OUTPATIENT
Start: 2018-06-01 | End: 2018-07-31

## 2018-06-01 RX ORDER — MELOXICAM 7.5 MG/1
7.5 TABLET ORAL 2 TIMES DAILY
Qty: 180 TABLET | Refills: 1 | Status: SHIPPED | OUTPATIENT
Start: 2018-06-01 | End: 2019-03-11 | Stop reason: ALTCHOICE

## 2018-06-01 ASSESSMENT — ENCOUNTER SYMPTOMS
EYE PAIN: 0
BACK PAIN: 1
VOMITING: 0
SORE THROAT: 0
HEMOPTYSIS: 0
HEARTBURN: 0
BLOOD IN STOOL: 0
SHORTNESS OF BREATH: 0
CONSTIPATION: 0
WHEEZING: 0
BLURRED VISION: 0
SINUS PAIN: 0
DOUBLE VISION: 0
DIARRHEA: 0
ABDOMINAL PAIN: 0
SPUTUM PRODUCTION: 0
STRIDOR: 0
NAUSEA: 0
COUGH: 0
EYE REDNESS: 0
EYES NEGATIVE: 1
PHOTOPHOBIA: 0
ORTHOPNEA: 0
EYE DISCHARGE: 0

## 2018-06-02 ASSESSMENT — ENCOUNTER SYMPTOMS
HOARSE VOICE: 0
CHOKING: 0
WATER BRASH: 0
STRIDOR: 0
BELCHING: 0
GLOBUS SENSATION: 0

## 2018-06-02 ASSESSMENT — PATIENT HEALTH QUESTIONNAIRE - PHQ9
1. LITTLE INTEREST OR PLEASURE IN DOING THINGS: 1
SUM OF ALL RESPONSES TO PHQ QUESTIONS 1-9: 2
2. FEELING DOWN, DEPRESSED OR HOPELESS: 1
SUM OF ALL RESPONSES TO PHQ9 QUESTIONS 1 & 2: 2

## 2018-06-06 ENCOUNTER — HOSPITAL ENCOUNTER (OUTPATIENT)
Dept: MAMMOGRAPHY | Age: 53
Discharge: HOME | End: 2018-06-08
Payer: COMMERCIAL

## 2018-06-06 DIAGNOSIS — Z12.31 SCREENING MAMMOGRAM, ENCOUNTER FOR: ICD-10-CM

## 2018-06-06 PROCEDURE — 77067 SCR MAMMO BI INCL CAD: CPT

## 2018-08-02 ENCOUNTER — TELEPHONE (OUTPATIENT)
Dept: FAMILY MEDICINE CLINIC | Age: 53
End: 2018-08-02

## 2018-08-06 ENCOUNTER — OFFICE VISIT (OUTPATIENT)
Dept: FAMILY MEDICINE CLINIC | Age: 53
End: 2018-08-06
Payer: COMMERCIAL

## 2018-08-06 ENCOUNTER — TELEPHONE (OUTPATIENT)
Dept: FAMILY MEDICINE CLINIC | Age: 53
End: 2018-08-06

## 2018-08-06 VITALS
RESPIRATION RATE: 16 BRPM | WEIGHT: 189 LBS | TEMPERATURE: 96.9 F | HEART RATE: 81 BPM | BODY MASS INDEX: 30.37 KG/M2 | SYSTOLIC BLOOD PRESSURE: 104 MMHG | HEIGHT: 66 IN | DIASTOLIC BLOOD PRESSURE: 66 MMHG | OXYGEN SATURATION: 94 %

## 2018-08-06 DIAGNOSIS — E78.2 MIXED HYPERLIPIDEMIA: ICD-10-CM

## 2018-08-06 DIAGNOSIS — I10 ESSENTIAL HYPERTENSION: Primary | ICD-10-CM

## 2018-08-06 DIAGNOSIS — Z12.11 COLON CANCER SCREENING: Primary | ICD-10-CM

## 2018-08-06 DIAGNOSIS — M51.36 DDD (DEGENERATIVE DISC DISEASE), LUMBAR: ICD-10-CM

## 2018-08-06 DIAGNOSIS — M51.36 DDD (DEGENERATIVE DISC DISEASE), LUMBAR: Primary | ICD-10-CM

## 2018-08-06 DIAGNOSIS — M17.12 PRIMARY OSTEOARTHRITIS OF LEFT KNEE: ICD-10-CM

## 2018-08-06 DIAGNOSIS — Z96.651 HISTORY OF TOTAL RIGHT KNEE REPLACEMENT (TKR): ICD-10-CM

## 2018-08-06 PROCEDURE — 99213 OFFICE O/P EST LOW 20 MIN: CPT | Performed by: FAMILY MEDICINE

## 2018-08-06 RX ORDER — TRAMADOL HYDROCHLORIDE 50 MG/1
50 TABLET ORAL EVERY 6 HOURS PRN
COMMUNITY
End: 2019-02-05 | Stop reason: SDUPTHER

## 2018-08-06 ASSESSMENT — ENCOUNTER SYMPTOMS
COUGH: 0
CONSTIPATION: 0
VOMITING: 0
BLURRED VISION: 0
SINUS PAIN: 0
EYES NEGATIVE: 1
BACK PAIN: 1
EYE PAIN: 0
HEARTBURN: 0
NAUSEA: 0
DIARRHEA: 0
HEMOPTYSIS: 0
WHEEZING: 0
SPUTUM PRODUCTION: 0
DOUBLE VISION: 0
ORTHOPNEA: 0
ABDOMINAL PAIN: 0
STRIDOR: 0
EYE DISCHARGE: 0
SORE THROAT: 0
PHOTOPHOBIA: 0
EYE REDNESS: 0
BLOOD IN STOOL: 0
SHORTNESS OF BREATH: 0

## 2018-08-06 NOTE — PROGRESS NOTES
Chele Ramos is a 52 y.o. female. HPI/Chief C/O:  Chief Complaint   Patient presents with    Surgery     9/10 pre surgery, 11/10 surgery. total knee on left knee     Allergies   Allergen Reactions    Penicillins Anaphylaxis    Dilaudid [Hydromorphone Hcl] Nausea And Vomiting    Demerol Hcl [Meperidine] Hives    Morphine      Can take alone, not with demerol    Tylox [Oxycodone-Acetaminophen] Hives    Sulfa Antibiotics Rash    Tape Jarome Perone Tape] Rash   She is here regarding a pre op for total left knee      Back Pain   This is a chronic problem. The current episode started more than 1 year ago. The problem occurs constantly. The problem has been gradually worsening since onset. The pain is present in the lumbar spine. The quality of the pain is described as burning and aching. The pain is at a severity of 8/10. The pain is moderate. The pain is the same all the time. Stiffness is present all day. Associated symptoms include numbness, tingling and weakness. Pertinent negatives include no abdominal pain, chest pain, dysuria, fever, headaches, leg pain, perianal numbness or weight loss. Knee Pain    The incident occurred more than 1 week ago. The pain is present in the left knee and right knee. The quality of the pain is described as shooting and stabbing. The pain is at a severity of 9/10. The pain is severe. The pain has been worsening since onset. Associated symptoms include a loss of motion, muscle weakness, numbness and tingling. Pertinent negatives include no inability to bear weight or loss of sensation. Gastroesophageal Reflux   She reports no abdominal pain, no belching, no chest pain, no choking, no coughing, no dysphagia, no early satiety, no globus sensation, no heartburn, no hoarse voice, no nausea, no sore throat, no stridor, no tooth decay, no water brash or no wheezing. This is a recurrent problem. The current episode started more than 1 year ago.  The problem occurs frequently. The problem has been gradually worsening. Associated symptoms include fatigue. Pertinent negatives include no anemia, melena, muscle weakness, orthopnea or weight loss. She has tried a PPI and a pro-motility drug for the symptoms. The treatment provided moderate relief. Past procedures include an EGD. Past procedures do not include an abdominal ultrasound, esophageal manometry, esophageal pH monitoring, H. pylori antibody titer or a UGI. Past invasive treatments do not include gastroplasty, gastroplication or reflux surgery. Hypertension   Associated symptoms include anxiety, malaise/fatigue and neck pain. Pertinent negatives include no blurred vision, chest pain, headaches, orthopnea, palpitations, peripheral edema, PND, shortness of breath or sweats. Agents associated with hypertension include NSAIDs. Risk factors for coronary artery disease include dyslipidemia, family history, post-menopausal state and obesity. Past treatments include lifestyle changes. The current treatment provides significant improvement. Compliance problems include psychosocial issues, exercise and diet. There is no history of angina, kidney disease, CAD/MI, CVA, heart failure, left ventricular hypertrophy, PVD or retinopathy. Identifiable causes of hypertension include a thyroid problem. There is no history of chronic renal disease, coarctation of the aorta, hyperaldosteronism, hypercortisolism, hyperparathyroidism, a hypertension causing med, pheochromocytoma, renovascular disease or sleep apnea. ROS:  Review of Systems   Constitutional: Positive for fatigue and malaise/fatigue. Negative for chills, diaphoresis, fever and weight loss. HENT: Negative for congestion, ear discharge, ear pain, hearing loss, hoarse voice, nosebleeds, sinus pain, sore throat and tinnitus. Eyes: Negative. Negative for blurred vision, double vision, photophobia, pain, discharge and redness.    Respiratory: Negative for cough, hemoptysis, sputum production, choking, shortness of breath, wheezing and stridor. Cardiovascular: Negative for chest pain, palpitations, orthopnea, claudication, leg swelling and PND. Gastrointestinal: Negative for abdominal pain, blood in stool, constipation, diarrhea, dysphagia, heartburn, melena, nausea and vomiting. Genitourinary: Negative. Negative for dysuria, flank pain, frequency, hematuria and urgency. Musculoskeletal: Positive for back pain, joint pain (she is pre op for a left knee replacement ), myalgias and neck pain. Negative for falls and muscle weakness. Skin: Negative for itching and rash. Neurological: Positive for tingling, sensory change, weakness and numbness. Negative for dizziness, tremors, speech change, focal weakness, seizures, loss of consciousness and headaches. Pain into her feet    Endo/Heme/Allergies: Negative. Negative for environmental allergies and polydipsia. Does not bruise/bleed easily. Psychiatric/Behavioral: Positive for depression. Negative for hallucinations, memory loss, substance abuse and suicidal ideas. The patient is not nervous/anxious and does not have insomnia.       Past Medical/Surgical Hx;  Reviewed with patient      Diagnosis Date    Arthritis     knee    Asthma     Cancer ([facility] [address] 07/2007    ovarian    H/O cardiovascular stress test 06/13/2017    lexiscan    Hiatal hernia with gastroesophageal reflux     Hx of cardiovascular stress test 10/2012    lexiscan nuclear stress    Hyperlipidemia     Hypoactive thyroid     no meds    Osteoarthritis     PONV (postoperative nausea and vomiting)     Preoperative clearance 01/09/2017    Medical clearance from Dr. Janes Galvan in EPIC notes    Ulcer of gastroesophageal junction      Past Surgical History:   Procedure Laterality Date    CHOLECYSTECTOMY      COLONOSCOPY      FRACTURE SURGERY Right 2005    humerus, hardware in place   [address] [facility] No murmur heard. Pulmonary/Chest: Effort normal. No stridor. No respiratory distress. She has no wheezes. She has no rales. She exhibits no tenderness. Abdominal: Soft. Bowel sounds are normal. She exhibits no distension and no mass. There is no tenderness. There is no rebound and no guarding. No hernia. Musculoskeletal: She exhibits tenderness. She exhibits no edema or deformity. Right knee: She exhibits decreased range of motion and bony tenderness. She exhibits no swelling, no effusion, no ecchymosis, no deformity, no laceration, no erythema, normal alignment, no LCL laxity, normal patellar mobility, normal meniscus and no MCL laxity. Tenderness found. Patellar tendon tenderness noted. No medial joint line, no lateral joint line, no MCL and no LCL tenderness noted. Lumbar back: She exhibits decreased range of motion, tenderness, bony tenderness, pain and spasm. She exhibits no swelling, no edema, no deformity, no laceration and normal pulse. Back:         Legs:  Severe left knee pain and limit to ROM   Lymphadenopathy:     She has no cervical adenopathy. Neurological: She is alert and oriented to person, place, and time. She has normal reflexes. She displays normal reflexes. A sensory deficit is present. No cranial nerve deficit. She exhibits normal muscle tone. Coordination normal.   Radicular pain into her feet   Skin: Skin is warm. No rash noted. She is not diaphoretic. No erythema. No pallor. Nursing note and vitals reviewed. ASSESSMENT/PLAN  Grecia was seen today for surgery.     Diagnoses and all orders for this visit:    Essential hypertension--controlled  -- CARDIAC--ASA, STATIN, ace, beta, hctz, ( ccb )    History of total right knee replacement (TKR)  --doing very well    Mixed hyperlipidemia  --mediterranean diet    DDD (degenerative disc disease), lumbar  --omt/ultra--Rx    Primary osteoarthritis of left knee  --omt/ultra--rx    DAVION's pre op score==0 ( low risk

## 2018-08-07 RX ORDER — DULOXETIN HYDROCHLORIDE 30 MG/1
30 CAPSULE, DELAYED RELEASE ORAL DAILY
Qty: 90 CAPSULE | Refills: 1 | Status: SHIPPED | OUTPATIENT
Start: 2018-08-07 | End: 2019-03-06 | Stop reason: SDUPTHER

## 2018-08-07 ASSESSMENT — PATIENT HEALTH QUESTIONNAIRE - PHQ9
2. FEELING DOWN, DEPRESSED OR HOPELESS: 1
SUM OF ALL RESPONSES TO PHQ QUESTIONS 1-9: 2
1. LITTLE INTEREST OR PLEASURE IN DOING THINGS: 1
SUM OF ALL RESPONSES TO PHQ9 QUESTIONS 1 & 2: 2

## 2018-08-07 ASSESSMENT — ENCOUNTER SYMPTOMS
STRIDOR: 0
BELCHING: 0
HOARSE VOICE: 0
GLOBUS SENSATION: 0
CHOKING: 0
WATER BRASH: 0

## 2018-09-13 ENCOUNTER — TELEPHONE (OUTPATIENT)
Dept: FAMILY MEDICINE CLINIC | Age: 53
End: 2018-09-13

## 2018-09-13 NOTE — TELEPHONE ENCOUNTER
MA left message for pt asking for a return phone call regarding FIT.     Electronically signed by Evgeny Avila on 9/13/18 at 10:10 AM

## 2019-03-06 ENCOUNTER — OFFICE VISIT (OUTPATIENT)
Dept: FAMILY MEDICINE CLINIC | Age: 54
End: 2019-03-06
Payer: COMMERCIAL

## 2019-03-06 ENCOUNTER — HOSPITAL ENCOUNTER (OUTPATIENT)
Age: 54
Discharge: HOME | End: 2019-03-08
Payer: COMMERCIAL

## 2019-03-06 VITALS
TEMPERATURE: 97.4 F | WEIGHT: 191.6 LBS | SYSTOLIC BLOOD PRESSURE: 118 MMHG | HEIGHT: 65 IN | DIASTOLIC BLOOD PRESSURE: 70 MMHG | RESPIRATION RATE: 18 BRPM | OXYGEN SATURATION: 96 % | HEART RATE: 88 BPM | BODY MASS INDEX: 31.92 KG/M2

## 2019-03-06 DIAGNOSIS — R73.01 IFG (IMPAIRED FASTING GLUCOSE): ICD-10-CM

## 2019-03-06 DIAGNOSIS — R53.83 FATIGUE, UNSPECIFIED TYPE: ICD-10-CM

## 2019-03-06 DIAGNOSIS — J01.90 ACUTE BACTERIAL SINUSITIS: Primary | ICD-10-CM

## 2019-03-06 DIAGNOSIS — B96.89 ACUTE BACTERIAL SINUSITIS: ICD-10-CM

## 2019-03-06 DIAGNOSIS — M51.36 DDD (DEGENERATIVE DISC DISEASE), LUMBAR: ICD-10-CM

## 2019-03-06 DIAGNOSIS — B96.89 ACUTE BACTERIAL SINUSITIS: Primary | ICD-10-CM

## 2019-03-06 DIAGNOSIS — E78.5 HYPERLIPIDEMIA, UNSPECIFIED HYPERLIPIDEMIA TYPE: ICD-10-CM

## 2019-03-06 DIAGNOSIS — E78.2 MIXED HYPERLIPIDEMIA: ICD-10-CM

## 2019-03-06 DIAGNOSIS — K44.9 HIATAL HERNIA WITH GASTROESOPHAGEAL REFLUX: ICD-10-CM

## 2019-03-06 DIAGNOSIS — M62.838 CERVICAL PARASPINAL MUSCLE SPASM: ICD-10-CM

## 2019-03-06 DIAGNOSIS — K21.9 GASTROESOPHAGEAL REFLUX DISEASE WITHOUT ESOPHAGITIS: ICD-10-CM

## 2019-03-06 DIAGNOSIS — K21.9 HIATAL HERNIA WITH GASTROESOPHAGEAL REFLUX: ICD-10-CM

## 2019-03-06 DIAGNOSIS — J01.90 ACUTE BACTERIAL SINUSITIS: ICD-10-CM

## 2019-03-06 LAB
ALBUMIN SERPL-MCNC: 4.6 G/DL (ref 3.5–5.2)
ALP BLD-CCNC: 85 U/L (ref 35–104)
ALT SERPL-CCNC: 6 U/L (ref 0–32)
ANION GAP SERPL CALCULATED.3IONS-SCNC: 14 MMOL/L (ref 7–16)
AST SERPL-CCNC: 18 U/L (ref 0–31)
BASOPHILS ABSOLUTE: 0.03 E9/L (ref 0–0.2)
BASOPHILS RELATIVE PERCENT: 0.9 % (ref 0–2)
BILIRUB SERPL-MCNC: 0.3 MG/DL (ref 0–1.2)
BUN BLDV-MCNC: 11 MG/DL (ref 6–20)
CALCIUM SERPL-MCNC: 9 MG/DL (ref 8.6–10.2)
CHLORIDE BLD-SCNC: 103 MMOL/L (ref 98–107)
CHOLESTEROL, TOTAL: 148 MG/DL (ref 0–199)
CO2: 24 MMOL/L (ref 22–29)
CREAT SERPL-MCNC: 1.1 MG/DL (ref 0.5–1)
EOSINOPHILS ABSOLUTE: 0.12 E9/L (ref 0.05–0.5)
EOSINOPHILS RELATIVE PERCENT: 3.8 % (ref 0–6)
GFR AFRICAN AMERICAN: >60
GFR NON-AFRICAN AMERICAN: 51 ML/MIN/1.73
GLUCOSE BLD-MCNC: 104 MG/DL (ref 74–99)
HBA1C MFR BLD: 4.9 % (ref 4–5.6)
HCT VFR BLD CALC: 37.3 % (ref 34–48)
HDLC SERPL-MCNC: 51 MG/DL
HEMOGLOBIN: 11.9 G/DL (ref 11.5–15.5)
IMMATURE GRANULOCYTES #: 0.01 E9/L
IMMATURE GRANULOCYTES %: 0.3 % (ref 0–5)
LDL CHOLESTEROL CALCULATED: 72 MG/DL (ref 0–99)
LYMPHOCYTES ABSOLUTE: 0.92 E9/L (ref 1.5–4)
LYMPHOCYTES RELATIVE PERCENT: 29.1 % (ref 20–42)
MCH RBC QN AUTO: 28.8 PG (ref 26–35)
MCHC RBC AUTO-ENTMCNC: 31.9 % (ref 32–34.5)
MCV RBC AUTO: 90.3 FL (ref 80–99.9)
MONOCYTES ABSOLUTE: 0.4 E9/L (ref 0.1–0.95)
MONOCYTES RELATIVE PERCENT: 12.7 % (ref 2–12)
NEUTROPHILS ABSOLUTE: 1.68 E9/L (ref 1.8–7.3)
NEUTROPHILS RELATIVE PERCENT: 53.2 % (ref 43–80)
PDW BLD-RTO: 14.5 FL (ref 11.5–15)
PLATELET # BLD: 233 E9/L (ref 130–450)
PMV BLD AUTO: 12.5 FL (ref 7–12)
POTASSIUM SERPL-SCNC: 4.2 MMOL/L (ref 3.5–5)
RBC # BLD: 4.13 E12/L (ref 3.5–5.5)
SODIUM BLD-SCNC: 141 MMOL/L (ref 132–146)
TOTAL PROTEIN: 7.1 G/DL (ref 6.4–8.3)
TRIGL SERPL-MCNC: 126 MG/DL (ref 0–149)
TSH SERPL DL<=0.05 MIU/L-ACNC: 5.57 UIU/ML (ref 0.27–4.2)
VLDLC SERPL CALC-MCNC: 25 MG/DL
WBC # BLD: 3.2 E9/L (ref 4.5–11.5)

## 2019-03-06 PROCEDURE — 83036 HEMOGLOBIN GLYCOSYLATED A1C: CPT

## 2019-03-06 PROCEDURE — 85025 COMPLETE CBC W/AUTO DIFF WBC: CPT

## 2019-03-06 PROCEDURE — 80061 LIPID PANEL: CPT

## 2019-03-06 PROCEDURE — 84443 ASSAY THYROID STIM HORMONE: CPT

## 2019-03-06 PROCEDURE — 80053 COMPREHEN METABOLIC PANEL: CPT

## 2019-03-06 PROCEDURE — 99214 OFFICE O/P EST MOD 30 MIN: CPT | Performed by: FAMILY MEDICINE

## 2019-03-06 RX ORDER — PANTOPRAZOLE SODIUM 40 MG/1
TABLET, DELAYED RELEASE ORAL
Qty: 90 TABLET | Refills: 1 | Status: SHIPPED
Start: 2019-03-06 | End: 2021-04-07 | Stop reason: SDUPTHER

## 2019-03-06 RX ORDER — DULOXETIN HYDROCHLORIDE 30 MG/1
30 CAPSULE, DELAYED RELEASE ORAL DAILY
Qty: 90 CAPSULE | Refills: 1 | Status: SHIPPED
Start: 2019-03-06 | End: 2021-04-07

## 2019-03-06 RX ORDER — METOCLOPRAMIDE 10 MG/1
TABLET ORAL
Qty: 90 TABLET | Refills: 1 | Status: SHIPPED
Start: 2019-03-06 | End: 2021-04-07 | Stop reason: SDUPTHER

## 2019-03-06 RX ORDER — AZITHROMYCIN 250 MG/1
250 TABLET, FILM COATED ORAL SEE ADMIN INSTRUCTIONS
Qty: 6 TABLET | Refills: 0 | Status: SHIPPED | OUTPATIENT
Start: 2019-03-06 | End: 2019-03-11

## 2019-03-06 RX ORDER — ROSUVASTATIN CALCIUM 5 MG/1
TABLET, COATED ORAL
Qty: 90 TABLET | Refills: 1 | Status: SHIPPED
Start: 2019-03-06 | End: 2021-04-07 | Stop reason: SDUPTHER

## 2019-03-06 ASSESSMENT — PATIENT HEALTH QUESTIONNAIRE - PHQ9
2. FEELING DOWN, DEPRESSED OR HOPELESS: 0
1. LITTLE INTEREST OR PLEASURE IN DOING THINGS: 0
SUM OF ALL RESPONSES TO PHQ QUESTIONS 1-9: 0
SUM OF ALL RESPONSES TO PHQ QUESTIONS 1-9: 0
SUM OF ALL RESPONSES TO PHQ9 QUESTIONS 1 & 2: 0

## 2019-03-07 ASSESSMENT — ENCOUNTER SYMPTOMS
SINUS PAIN: 1
SHORTNESS OF BREATH: 0
EYES NEGATIVE: 1
ABDOMINAL DISTENTION: 0
NAUSEA: 0
ABDOMINAL PAIN: 0
BACK PAIN: 1
COUGH: 1
VOICE CHANGE: 0
CHEST TIGHTNESS: 0
COLOR CHANGE: 0
RHINORRHEA: 0
CONSTIPATION: 0
VOMITING: 0
EYE ITCHING: 0
FACIAL SWELLING: 0
SORE THROAT: 1
EYE PAIN: 0
CHOKING: 0
BLOOD IN STOOL: 0
ANAL BLEEDING: 0
SINUS PRESSURE: 1
RECTAL PAIN: 0
ALLERGIC/IMMUNOLOGIC NEGATIVE: 1
DIARRHEA: 0
EYE DISCHARGE: 0
PHOTOPHOBIA: 0
STRIDOR: 0
WHEEZING: 0
EYE REDNESS: 0
TROUBLE SWALLOWING: 0

## 2019-03-11 ENCOUNTER — TELEPHONE (OUTPATIENT)
Dept: FAMILY MEDICINE CLINIC | Age: 54
End: 2019-03-11

## 2019-03-11 DIAGNOSIS — N28.9 RENAL INSUFFICIENCY: Primary | ICD-10-CM

## 2019-05-02 ENCOUNTER — HOSPITAL ENCOUNTER (OUTPATIENT)
Age: 54
Discharge: HOME | End: 2019-05-04
Payer: COMMERCIAL

## 2019-05-02 LAB
ALCOHOL URINE: NOT DETECTED MG/DL
AMPHETAMINE SCREEN, URINE: NOT DETECTED
BARBITURATE SCREEN URINE: NOT DETECTED
BENZODIAZEPINE SCREEN, URINE: NOT DETECTED
CANNABINOID SCREEN URINE: NOT DETECTED
COCAINE METABOLITE SCREEN URINE: NOT DETECTED
METHADONE SCREEN, URINE: NOT DETECTED
OPIATE SCREEN URINE: NOT DETECTED
PHENCYCLIDINE SCREEN URINE: NOT DETECTED
PROPOXYPHENE SCREEN: NOT DETECTED

## 2019-05-02 PROCEDURE — G0480 DRUG TEST DEF 1-7 CLASSES: HCPCS

## 2019-05-02 PROCEDURE — 80307 DRUG TEST PRSMV CHEM ANLYZR: CPT

## 2019-05-09 LAB
6AM URINE: <10 NG/ML
CODEINE, URINE: <20 NG/ML
HYDROCODONE, URINE: <20 NG/ML
HYDROMORPHONE, URINE: <20 NG/ML
MORPHINE URINE: <20 NG/ML
NORHYDROCODONE, URINE: <20 NG/ML
NOROXYCODONE, URINE: <20 NG/ML
NOROXYMORPHONE, URINE: <20 NG/ML
OXYCODONE, URINE CONFIRMATION: <20 NG/ML
OXYMORPHONE, URINE: <20 NG/ML

## 2019-05-13 LAB
O-DESMETHYLTRAMADOL,UR: 7438 NG/ML
TRAMADOL, URINE: NORMAL NG/ML

## 2019-06-11 ENCOUNTER — HOSPITAL ENCOUNTER (OUTPATIENT)
Dept: MAMMOGRAPHY | Age: 54
Discharge: HOME | End: 2019-06-13
Payer: COMMERCIAL

## 2019-06-11 ENCOUNTER — NURSE ONLY (OUTPATIENT)
Dept: FAMILY MEDICINE CLINIC | Age: 54
End: 2019-06-11
Payer: COMMERCIAL

## 2019-06-11 ENCOUNTER — HOSPITAL ENCOUNTER (OUTPATIENT)
Age: 54
Discharge: HOME | End: 2019-06-13
Payer: COMMERCIAL

## 2019-06-11 DIAGNOSIS — N28.9 RENAL INSUFFICIENCY: Primary | ICD-10-CM

## 2019-06-11 DIAGNOSIS — N28.9 RENAL INSUFFICIENCY: ICD-10-CM

## 2019-06-11 DIAGNOSIS — Z12.31 VISIT FOR SCREENING MAMMOGRAM: ICD-10-CM

## 2019-06-11 LAB
ALBUMIN SERPL-MCNC: 4.5 G/DL (ref 3.5–5.2)
ALP BLD-CCNC: 87 U/L (ref 35–104)
ALT SERPL-CCNC: 7 U/L (ref 0–32)
ANION GAP SERPL CALCULATED.3IONS-SCNC: 13 MMOL/L (ref 7–16)
AST SERPL-CCNC: 20 U/L (ref 0–31)
BILIRUB SERPL-MCNC: 0.3 MG/DL (ref 0–1.2)
BUN BLDV-MCNC: 12 MG/DL (ref 6–20)
CALCIUM SERPL-MCNC: 9.6 MG/DL (ref 8.6–10.2)
CHLORIDE BLD-SCNC: 103 MMOL/L (ref 98–107)
CO2: 27 MMOL/L (ref 22–29)
CREAT SERPL-MCNC: 1.1 MG/DL (ref 0.5–1)
GFR AFRICAN AMERICAN: >60
GFR NON-AFRICAN AMERICAN: 51 ML/MIN/1.73
GLUCOSE BLD-MCNC: 96 MG/DL (ref 74–99)
POTASSIUM SERPL-SCNC: 4.9 MMOL/L (ref 3.5–5)
SODIUM BLD-SCNC: 143 MMOL/L (ref 132–146)
TOTAL PROTEIN: 7.4 G/DL (ref 6.4–8.3)

## 2019-06-11 PROCEDURE — 80053 COMPREHEN METABOLIC PANEL: CPT

## 2019-06-11 PROCEDURE — 36415 COLL VENOUS BLD VENIPUNCTURE: CPT | Performed by: FAMILY MEDICINE

## 2020-02-16 ENCOUNTER — HOSPITAL ENCOUNTER (EMERGENCY)
Age: 55
Discharge: HOME | End: 2020-02-16
Attending: EMERGENCY MEDICINE

## 2020-02-16 ENCOUNTER — APPOINTMENT (OUTPATIENT)
Dept: GENERAL RADIOLOGY | Age: 55
End: 2020-02-16

## 2020-02-16 VITALS
BODY MASS INDEX: 31.65 KG/M2 | HEIGHT: 65 IN | RESPIRATION RATE: 16 BRPM | DIASTOLIC BLOOD PRESSURE: 73 MMHG | OXYGEN SATURATION: 97 % | SYSTOLIC BLOOD PRESSURE: 130 MMHG | HEART RATE: 89 BPM | WEIGHT: 190 LBS | TEMPERATURE: 98.9 F

## 2020-02-16 LAB
ANION GAP SERPL CALCULATED.3IONS-SCNC: 13 MMOL/L (ref 7–16)
BASOPHILS ABSOLUTE: 0 E9/L (ref 0–0.2)
BASOPHILS RELATIVE PERCENT: 0.3 % (ref 0–2)
BUN BLDV-MCNC: 10 MG/DL (ref 6–20)
CALCIUM SERPL-MCNC: 9.5 MG/DL (ref 8.6–10.2)
CHLORIDE BLD-SCNC: 103 MMOL/L (ref 98–107)
CO2: 25 MMOL/L (ref 22–29)
CREAT SERPL-MCNC: 1 MG/DL (ref 0.5–1)
EOSINOPHILS ABSOLUTE: 0.07 E9/L (ref 0.05–0.5)
EOSINOPHILS RELATIVE PERCENT: 1.7 % (ref 0–6)
GFR AFRICAN AMERICAN: >60
GFR NON-AFRICAN AMERICAN: 57 ML/MIN/1.73
GLUCOSE BLD-MCNC: 111 MG/DL (ref 74–99)
HCT VFR BLD CALC: 34.7 % (ref 34–48)
HEMOGLOBIN: 11.5 G/DL (ref 11.5–15.5)
INFLUENZA A BY PCR: NOT DETECTED
INFLUENZA B BY PCR: NOT DETECTED
LYMPHOCYTES ABSOLUTE: 0.31 E9/L (ref 1.5–4)
LYMPHOCYTES RELATIVE PERCENT: 7.8 % (ref 20–42)
MCH RBC QN AUTO: 31.4 PG (ref 26–35)
MCHC RBC AUTO-ENTMCNC: 33.1 % (ref 32–34.5)
MCV RBC AUTO: 94.8 FL (ref 80–99.9)
MONOCYTES ABSOLUTE: 0.16 E9/L (ref 0.1–0.95)
MONOCYTES RELATIVE PERCENT: 3.5 % (ref 2–12)
NEUTROPHILS ABSOLUTE: 3.39 E9/L (ref 1.8–7.3)
NEUTROPHILS RELATIVE PERCENT: 87 % (ref 43–80)
OVALOCYTES: ABNORMAL
PDW BLD-RTO: 13.8 FL (ref 11.5–15)
PLATELET # BLD: 186 E9/L (ref 130–450)
PMV BLD AUTO: 11.1 FL (ref 7–12)
POIKILOCYTES: ABNORMAL
POTASSIUM SERPL-SCNC: 4.4 MMOL/L (ref 3.5–5)
RBC # BLD: 3.66 E12/L (ref 3.5–5.5)
SODIUM BLD-SCNC: 141 MMOL/L (ref 132–146)
WBC # BLD: 3.9 E9/L (ref 4.5–11.5)

## 2020-02-16 PROCEDURE — 6370000000 HC RX 637 (ALT 250 FOR IP): Performed by: STUDENT IN AN ORGANIZED HEALTH CARE EDUCATION/TRAINING PROGRAM

## 2020-02-16 PROCEDURE — 2580000003 HC RX 258: Performed by: STUDENT IN AN ORGANIZED HEALTH CARE EDUCATION/TRAINING PROGRAM

## 2020-02-16 PROCEDURE — 80048 BASIC METABOLIC PNL TOTAL CA: CPT

## 2020-02-16 PROCEDURE — 99283 EMERGENCY DEPT VISIT LOW MDM: CPT

## 2020-02-16 PROCEDURE — 36415 COLL VENOUS BLD VENIPUNCTURE: CPT

## 2020-02-16 PROCEDURE — 71046 X-RAY EXAM CHEST 2 VIEWS: CPT

## 2020-02-16 PROCEDURE — 85025 COMPLETE CBC W/AUTO DIFF WBC: CPT

## 2020-02-16 PROCEDURE — 87502 INFLUENZA DNA AMP PROBE: CPT

## 2020-02-16 RX ORDER — BENZONATATE 100 MG/1
100 CAPSULE ORAL 3 TIMES DAILY PRN
Qty: 21 CAPSULE | Refills: 0 | Status: SHIPPED | OUTPATIENT
Start: 2020-02-16 | End: 2020-02-23

## 2020-02-16 RX ORDER — ALBUTEROL SULFATE 90 UG/1
2 AEROSOL, METERED RESPIRATORY (INHALATION) EVERY 6 HOURS PRN
Qty: 1 INHALER | Refills: 0 | Status: SHIPPED | OUTPATIENT
Start: 2020-02-16 | End: 2021-01-12 | Stop reason: ALTCHOICE

## 2020-02-16 RX ORDER — 0.9 % SODIUM CHLORIDE 0.9 %
1000 INTRAVENOUS SOLUTION INTRAVENOUS ONCE
Status: COMPLETED | OUTPATIENT
Start: 2020-02-16 | End: 2020-02-16

## 2020-02-16 RX ORDER — ACETAMINOPHEN 500 MG
1000 TABLET ORAL ONCE
Status: COMPLETED | OUTPATIENT
Start: 2020-02-16 | End: 2020-02-16

## 2020-02-16 RX ADMIN — SODIUM CHLORIDE 1000 ML: 9 INJECTION, SOLUTION INTRAVENOUS at 11:54

## 2020-02-16 RX ADMIN — ACETAMINOPHEN 1000 MG: 500 TABLET, FILM COATED ORAL at 11:54

## 2020-02-16 ASSESSMENT — ENCOUNTER SYMPTOMS
PHOTOPHOBIA: 0
COUGH: 1
BLOOD IN STOOL: 0
EYE PAIN: 0
CHEST TIGHTNESS: 0
ABDOMINAL PAIN: 0
SINUS PRESSURE: 0
NAUSEA: 0
ABDOMINAL DISTENTION: 0
BACK PAIN: 0
RHINORRHEA: 0
WHEEZING: 0
TROUBLE SWALLOWING: 0
SHORTNESS OF BREATH: 0
SORE THROAT: 0
EYE REDNESS: 0
DIARRHEA: 0
VOMITING: 0
CONSTIPATION: 0

## 2020-02-16 ASSESSMENT — PAIN SCALES - GENERAL: PAINLEVEL_OUTOF10: 0

## 2020-02-16 ASSESSMENT — PAIN DESCRIPTION - LOCATION: LOCATION: GENERALIZED

## 2020-02-16 NOTE — ED PROVIDER NOTES
Patient is a 57-year-old female who presents to ED for evaluation of cough. Patient notes that onset of symptoms was 1 day prior to arrival.  Patient states that she has had productive sputum over the past several hours. Patient has associated nasal congestion and generalized body aches. Patient denies shortness of breath, lower extremity swelling, hemoptysis, history of DVT/PE, recent immobilization, recent surgery, or history of cancer. Patient denies associated lightheadedness, chest pain, diaphoresis or palpitations. Patient states that she is up-to-date on influenza vaccine received her influenza vaccine this year. Patient notes potential sick contacts at work. Review of Systems   Constitutional: Negative for chills, diaphoresis, fatigue and fever. HENT: Positive for congestion. Negative for ear pain, rhinorrhea, sinus pressure, sneezing, sore throat and trouble swallowing. Eyes: Negative for photophobia, pain and redness. Respiratory: Positive for cough. Negative for chest tightness, shortness of breath and wheezing. Cardiovascular: Negative for chest pain and palpitations. Gastrointestinal: Negative for abdominal distention, abdominal pain, blood in stool, constipation, diarrhea, nausea and vomiting. Endocrine: Negative for polydipsia and polyuria. Genitourinary: Negative for difficulty urinating, dysuria, flank pain, hematuria and urgency. Musculoskeletal: Positive for myalgias. Negative for arthralgias, back pain and neck pain. Skin: Negative for rash and wound. Neurological: Negative for weakness, light-headedness, numbness and headaches. Psychiatric/Behavioral: Negative for agitation and confusion. The patient is not nervous/anxious and is not hyperactive. Physical Exam  Constitutional:       General: She is not in acute distress. Appearance: She is well-developed. She is not diaphoretic. HENT:      Head: Normocephalic and atraumatic.       Right Ear: External ear normal.      Left Ear: External ear normal.      Mouth/Throat:      Mouth: Mucous membranes are dry. Pharynx: No oropharyngeal exudate. Eyes:      General:         Right eye: No discharge. Left eye: No discharge. Conjunctiva/sclera: Conjunctivae normal.      Pupils: Pupils are equal, round, and reactive to light. Neck:      Musculoskeletal: Normal range of motion and neck supple. Thyroid: No thyromegaly. Cardiovascular:      Rate and Rhythm: Normal rate and regular rhythm. Heart sounds: Normal heart sounds. No murmur. Pulmonary:      Effort: Pulmonary effort is normal. No respiratory distress. Breath sounds: Normal breath sounds. No wheezing or rales. Comments: Lungs clear to auscultation bilaterally, no conversational dyspnea or accessory muscle use. Chest:      Chest wall: No tenderness. Abdominal:      General: There is no distension. Palpations: Abdomen is soft. There is no mass. Tenderness: There is no abdominal tenderness. There is no guarding or rebound. Musculoskeletal: Normal range of motion. General: No tenderness. Comments: No lower extremity swelling or calf tenderness palpation. Skin:     General: Skin is warm and dry. Findings: No rash. Neurological:      Mental Status: She is alert and oriented to person, place, and time. Psychiatric:         Behavior: Behavior normal.         Thought Content: Thought content normal.         Judgment: Judgment normal.          Procedures     MDM  Number of Diagnoses or Management Options  Acute upper respiratory infection:   Diagnosis management comments: Patient is a 69-year-old female who presented to ED for evaluation of 1 day history of cough, congestion, generalized body aches. On arrival to ED, physical exam as noted above. Patient initially tachycardic 114, febrile 100.9 °F.  Patient was administered Tylenol, IV fluids.   Labs reviewed--influenza negative, no leukocytosis, serum chemistry unremarkable. Chest x-ray with no acute cardiopulmonary process. Patient given supportive care instructions. On repeat evaluation, patient with resolution of tachycardia and fever. Patient noting improved symptoms after medication administration. Patient was also instructed to follow-up closely with primary care physician. Strict return instructions to ED were given patient prior to disposition. Patient with all questions answered prior to discharge and is agreeable to plan. ED Course as of Feb 16 1608   Sun Feb 16, 2020 1222   ATTENDING PROVIDER ATTESTATION:     I have personally performed and/or participated in the history, exam, medical decision making, and procedures and agree with all pertinent clinical information unless otherwise noted. I have also reviewed and agree with the past medical, family and social history unless otherwise noted. I have discussed this patient in detail with the resident and provided the instruction and education regarding the evidence-based evaluation and treatment of [unfilled]  History: patient reports a cough since starting work in October. 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 She states cough syrup does not help. Today she developed a fever, myalgias and congestion. No prior history of PE/DVT. My findings: Malgorzata Modi is a 62 y.o. female whom is in no distress. Physical exam reveals slightly dry mucous membranes. Heart rate is regular and fast. Lungs CTA. Abdomen is soft and nontender. No peripheral edema. My plan: Symptomatic and supportive care. Will evaluate for influenza and check a d-dimer.     Electronically signed by Anahi Khan DO on 2/16/20 at 12:22 PM          [JS]      ED Course User Index  [JS] Anahi Khan DO          ED Course as of Feb 16 1608   Sun Feb 16, 2020   1222   ATTENDING PROVIDER ATTESTATION:     I have personally performed and/or participated in the history, exam, medical decision making, and procedures and agree with all pertinent clinical information unless otherwise noted. I have also reviewed and agree with the past medical, family and social history unless otherwise noted. I have discussed this patient in detail with the resident and provided the instruction and education regarding the evidence-based evaluation and treatment of [unfilled]  History: patient reports a cough since starting work in October. 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 She states cough syrup does not help. Today she developed a fever, myalgias and congestion. No prior history of PE/DVT. My findings: Skyler Aranda is a 62 y.o. female whom is in no distress. Physical exam reveals slightly dry mucous membranes. Heart rate is regular and fast. Lungs CTA. Abdomen is soft and nontender. No peripheral edema. My plan: Symptomatic and supportive care. Will evaluate for influenza and check a d-dimer. Electronically signed by Yas Iraheta DO on 2/16/20 at 12:22 PM          [JS]      ED Course User Index  [JS] Yas Iraheta DO       --------------------------------------------- PAST HISTORY ---------------------------------------------  Past Medical History:  has a past medical history of Arthritis, Asthma, Cancer ([facility] [address] H/O cardiovascular stress test, Hiatal hernia with gastroesophageal reflux, Hx of cardiovascular stress test, Hyperlipidemia, Hypoactive thyroid, Osteoarthritis, PONV (postoperative nausea and vomiting), Preoperative clearance, and Ulcer of gastroesophageal junction. Past Surgical History:  has a past surgical history that includes Humerus Closed Reduction; Hemorrhoid surgery; Knee arthroscopy (right); Cholecystectomy; Colonoscopy; Upper gastrointestinal endoscopy; Nerve Block (N/A, 5 15 13); Nerve Block (N/A, 5/29/13);  Nerve Block (06 12 2013); Hysterectomy (7/18/07); Tubal ligation; fracture surgery (Right, 2005); Knee Arthroplasty (Right, 02/02/2017); Total knee arthroplasty (Right); lumbar laminectomy (05/23/2014); and joint replacement (Left, 11/2018). Social History:  reports that she quit smoking about 41 years ago. Her smoking use included cigarettes. She started smoking about 42 years ago. She has a 0.50 pack-year smoking history. She has never used smokeless tobacco. She reports current alcohol use of about 2.0 standard drinks of alcohol per week. She reports that she does not use drugs. Family History: family history includes Arthritis in an other family member; Cancer in her mother and another family member; Emphysema in her mother; Heart Disease in an other family member; Migraines in her mother. The patients home medications have been reviewed. Allergies: Penicillins; Dilaudid [hydromorphone hcl]; Demerol hcl [meperidine]; Morphine;  Tylox [oxycodone-acetaminophen]; Sulfa antibiotics; and Tape [adhesive tape]    -------------------------------------------------- RESULTS -------------------------------------------------  Labs:  Results for orders placed or performed during the hospital encounter of 02/16/20   Rapid influenza A/B antigens   Result Value Ref Range    Influenza A by PCR Not Detected Not Detected    Influenza B by PCR Not Detected Not Detected   CBC auto differential   Result Value Ref Range    WBC 3.9 (L) 4.5 - 11.5 E9/L    RBC 3.66 3.50 - 5.50 E12/L    Hemoglobin 11.5 11.5 - 15.5 g/dL    Hematocrit 34.7 34.0 - 48.0 %    MCV 94.8 80.0 - 99.9 fL    MCH 31.4 26.0 - 35.0 pg    MCHC 33.1 32.0 - 34.5 %    RDW 13.8 11.5 - 15.0 fL    Platelets 266 868 - 710 E9/L    MPV 11.1 7.0 - 12.0 fL    Neutrophils % 87.0 (H) 43.0 - 80.0 %    Lymphocytes % 7.8 (L) 20.0 - 42.0 %    Monocytes % 3.5 2.0 - 12.0 %    Eosinophils % 1.7 0.0 - 6.0 %    Basophils % 0.3 0.0 - 2.0 %    Neutrophils Absolute 3.39 1.80 - 7.30 E9/L    Lymphocytes Absolute 0.31 (L) 1.50 - 4.00 E9/L    Monocytes Absolute 0.16 0.10 - 0.95 E9/L    Eosinophils Absolute 0.07 0.05 - 0.50 E9/L    Basophils Absolute 0.00 0.00 - 0.20 E9/L    Poikilocytes 1+     Ovalocytes 1+    Basic Metabolic Panel   Result Value Ref Range    Sodium 141 132 - 146 mmol/L    Potassium 4.4 3.5 - 5.0 mmol/L    Chloride 103 98 - 107 mmol/L    CO2 25 22 - 29 mmol/L    Anion Gap 13 7 - 16 mmol/L    Glucose 111 (H) 74 - 99 mg/dL    BUN 10 6 - 20 mg/dL    CREATININE 1.0 0.5 - 1.0 mg/dL    GFR Non-African American 57 >=60 mL/min/1.73    GFR African American >60     Calcium 9.5 8.6 - 10.2 mg/dL       Radiology:  XR CHEST STANDARD (2 VW)   Final Result   1. No active cardiopulmonary disease.           ------------------------- NURSING NOTES AND VITALS REVIEWED ---------------------------  Date / Time Roomed:  2/16/2020 11:24 AM  ED Bed Assignment:  19/19    The nursing notes within the ED encounter and vital signs as below have been reviewed. /73   Pulse 89   Temp 98.9 °F (37.2 °C)   Resp 16   Ht 5' 5\" (1.651 m)   Wt 190 lb (86.2 kg)   LMP  (LMP Unknown)   SpO2 97%   BMI 31.62 kg/m²   Oxygen Saturation Interpretation: Normal      ------------------------------------------ PROGRESS NOTES ------------------------------------------  12:48 PM  I have spoken with the patient and discussed todays results, in addition to providing specific details for the plan of care and counseling regarding the diagnosis and prognosis. Their questions are answered at this time and they are agreeable with the plan. I discussed at length with them reasons for immediate return here for re evaluation. They will followup with their primary care physician by calling their office tomorrow. --------------------------------- ADDITIONAL PROVIDER NOTES ---------------------------------  At this time the patient is without objective evidence of an acute process requiring hospitalization or inpatient management.   They have

## 2020-07-23 ENCOUNTER — HOSPITAL ENCOUNTER (EMERGENCY)
Age: 55
Discharge: HOME | End: 2020-07-23
Attending: EMERGENCY MEDICINE

## 2020-07-23 VITALS
WEIGHT: 185 LBS | HEIGHT: 65 IN | BODY MASS INDEX: 30.82 KG/M2 | OXYGEN SATURATION: 100 % | DIASTOLIC BLOOD PRESSURE: 74 MMHG | HEART RATE: 82 BPM | SYSTOLIC BLOOD PRESSURE: 126 MMHG | RESPIRATION RATE: 16 BRPM | TEMPERATURE: 97.7 F

## 2020-07-23 PROCEDURE — 6360000002 HC RX W HCPCS: Performed by: EMERGENCY MEDICINE

## 2020-07-23 PROCEDURE — 6370000000 HC RX 637 (ALT 250 FOR IP): Performed by: EMERGENCY MEDICINE

## 2020-07-23 PROCEDURE — 96374 THER/PROPH/DIAG INJ IV PUSH: CPT

## 2020-07-23 PROCEDURE — 99283 EMERGENCY DEPT VISIT LOW MDM: CPT

## 2020-07-23 PROCEDURE — 96372 THER/PROPH/DIAG INJ SC/IM: CPT

## 2020-07-23 RX ORDER — TRAMADOL HYDROCHLORIDE 50 MG/1
50 TABLET ORAL EVERY 6 HOURS PRN
Qty: 12 TABLET | Refills: 0 | Status: SHIPPED | OUTPATIENT
Start: 2020-07-23 | End: 2020-07-26

## 2020-07-23 RX ORDER — METHYLPREDNISOLONE 4 MG/1
TABLET ORAL
Qty: 1 KIT | Refills: 0 | Status: SHIPPED | OUTPATIENT
Start: 2020-07-23 | End: 2020-07-29

## 2020-07-23 RX ORDER — ORPHENADRINE CITRATE 30 MG/ML
60 INJECTION INTRAMUSCULAR; INTRAVENOUS ONCE
Status: COMPLETED | OUTPATIENT
Start: 2020-07-23 | End: 2020-07-23

## 2020-07-23 RX ORDER — KETOROLAC TROMETHAMINE 30 MG/ML
30 INJECTION, SOLUTION INTRAMUSCULAR; INTRAVENOUS ONCE
Status: COMPLETED | OUTPATIENT
Start: 2020-07-23 | End: 2020-07-23

## 2020-07-23 RX ORDER — CYCLOBENZAPRINE HCL 10 MG
10 TABLET ORAL 3 TIMES DAILY PRN
Qty: 21 TABLET | Refills: 0 | Status: SHIPPED | OUTPATIENT
Start: 2020-07-23 | End: 2020-08-02

## 2020-07-23 RX ORDER — MORPHINE SULFATE 10 MG/ML
8 INJECTION, SOLUTION INTRAMUSCULAR; INTRAVENOUS ONCE
Status: COMPLETED | OUTPATIENT
Start: 2020-07-23 | End: 2020-07-23

## 2020-07-23 RX ORDER — TRAMADOL HYDROCHLORIDE 50 MG/1
50 TABLET ORAL ONCE
Status: COMPLETED | OUTPATIENT
Start: 2020-07-23 | End: 2020-07-23

## 2020-07-23 RX ADMIN — ORPHENADRINE CITRATE 60 MG: 30 INJECTION INTRAMUSCULAR; INTRAVENOUS at 05:21

## 2020-07-23 RX ADMIN — MORPHINE SULFATE 8 MG: 10 INJECTION INTRAVENOUS at 06:08

## 2020-07-23 RX ADMIN — KETOROLAC TROMETHAMINE 30 MG: 30 INJECTION, SOLUTION INTRAMUSCULAR at 05:21

## 2020-07-23 RX ADMIN — TRAMADOL HYDROCHLORIDE 50 MG: 50 TABLET, FILM COATED ORAL at 05:21

## 2020-07-23 ASSESSMENT — PAIN DESCRIPTION - PAIN TYPE
TYPE: ACUTE PAIN;CHRONIC PAIN
TYPE: CHRONIC PAIN

## 2020-07-23 ASSESSMENT — ENCOUNTER SYMPTOMS
WHEEZING: 0
COUGH: 0
SINUS PRESSURE: 0
EYE DISCHARGE: 0
ABDOMINAL DISTENTION: 0
NAUSEA: 0
EYE PAIN: 0
SHORTNESS OF BREATH: 0
SORE THROAT: 0
DIARRHEA: 0
BACK PAIN: 1
VOMITING: 0
EYE REDNESS: 0

## 2020-07-23 ASSESSMENT — PAIN SCALES - GENERAL
PAINLEVEL_OUTOF10: 5
PAINLEVEL_OUTOF10: 4
PAINLEVEL_OUTOF10: 10
PAINLEVEL_OUTOF10: 10

## 2020-07-23 NOTE — LETTER
[facility] Emergency Department  [address] [place] [address]  Phone: [phone]             July 23, 2020    Patient: Meliza Fabian   YOB: 1965   Date of Visit: 7/23/2020       To Whom It May Concern:    Meliza Fabian was seen and treated in our emergency department on 7/23/2020.  She may return to Work/School on the following date _____7/24/2020_______      Sincerely,             Signature:__________________________________

## 2020-07-23 NOTE — ED NOTES
Discharge instructions and medications reviewed, patient verbalized understanding     Radha Gar RN  07/23/20 [id number]

## 2020-07-23 NOTE — ED PROVIDER NOTES
Patient is a 59 y/o female who presents to the ED with back pain. Patient states she has a history of chronic back pain secondary to bulging discs and spinal stenosis and sciatica. Yesterday, she had increased pain. She had difficulty sleeping last night and her  had to help her get out of bed and on and off of the toilet overnight. She took Tylenol, however, it did not help. She states that she could not get herself out of bed this morning secondary to the pain. Currently, her pain is 10/10. She denies any injury. She denies any radiation of the pain, numbness, weakness or loss of bowel or bladder function. She has not seen her PCP or neurosurgeon because she does not have health insurance. Review of Systems   Constitutional: Negative for chills and fever. HENT: Negative for ear pain, sinus pressure and sore throat. Eyes: Negative for pain, discharge and redness. Respiratory: Negative for cough, shortness of breath and wheezing. Cardiovascular: Negative for chest pain. Gastrointestinal: Negative for abdominal distention, diarrhea, nausea and vomiting. Genitourinary: Negative for dysuria and frequency. Musculoskeletal: Positive for back pain. Negative for arthralgias. Skin: Negative for rash and wound. Neurological: Negative for weakness and headaches. Hematological: Negative for adenopathy. All other systems reviewed and are negative. Physical Exam  Vitals signs and nursing note reviewed. Constitutional:       General: She is not in acute distress. Appearance: She is obese. HENT:      Head: Normocephalic and atraumatic. Right Ear: External ear normal.      Left Ear: External ear normal.      Nose: Nose normal.      Mouth/Throat:      Mouth: Mucous membranes are moist.   Eyes:      Conjunctiva/sclera: Conjunctivae normal.      Pupils: Pupils are equal, round, and reactive to light. Neck:      Musculoskeletal: Normal range of motion and neck supple. replacement (Left, 11/2018). Social History:  reports that she quit smoking about 41 years ago. Her smoking use included cigarettes. She started smoking about 42 years ago. She has a 0.50 pack-year smoking history. She has never used smokeless tobacco. She reports current alcohol use of about 2.0 standard drinks of alcohol per week. She reports that she does not use drugs. Family History: family history includes Arthritis in an other family member; Cancer in her mother and another family member; Emphysema in her mother; Heart Disease in an other family member; Migraines in her mother. The patients home medications have been reviewed. Allergies: Penicillins; Dilaudid [hydromorphone hcl]; Demerol hcl [meperidine]; Morphine; Tylox [oxycodone-acetaminophen]; Sulfa antibiotics; and Tape [adhesive tape]    -------------------------------------------------- RESULTS -------------------------------------------------  Labs:  No results found for this visit on 07/23/20. Radiology:  No orders to display       ------------------------- NURSING NOTES AND VITALS REVIEWED ---------------------------  Date / Time Roomed:  7/23/2020  4:52 AM  ED Bed Assignment:  05/05    The nursing notes within the ED encounter and vital signs as below have been reviewed. /86   Pulse 75   Temp 97.7 °F (36.5 °C) (Oral)   Resp 18   Ht 5' 5\" (1.651 m)   Wt 185 lb (83.9 kg)   LMP  (LMP Unknown)   SpO2 98%   BMI 30.79 kg/m²   Oxygen Saturation Interpretation: Normal      ------------------------------------------ PROGRESS NOTES ------------------------------------------  I have spoken with the patient and discussed todays results, in addition to providing specific details for the plan of care and counseling regarding the diagnosis and prognosis. Their questions are answered at this time and they are agreeable with the plan. I discussed at length with them reasons for immediate return here for re evaluation.  They will

## 2020-07-24 ENCOUNTER — CARE COORDINATION (OUTPATIENT)
Dept: CARE COORDINATION | Age: 55
End: 2020-07-24

## 2020-07-25 ENCOUNTER — CARE COORDINATION (OUTPATIENT)
Dept: CARE COORDINATION | Age: 55
End: 2020-07-25

## 2020-07-25 NOTE — CARE COORDINATION
COVID-19 ED/Flu Clinic Initial Outreach Note      This Joan Savannah made second attempt to contact the patient by telephone to perform post discharge call. Left message. Will no longer make an outreach.

## 2020-08-04 ENCOUNTER — OFFICE VISIT (OUTPATIENT)
Dept: FAMILY MEDICINE CLINIC | Age: 55
End: 2020-08-04

## 2020-08-04 VITALS
HEART RATE: 90 BPM | DIASTOLIC BLOOD PRESSURE: 78 MMHG | HEIGHT: 65 IN | WEIGHT: 199.6 LBS | SYSTOLIC BLOOD PRESSURE: 138 MMHG | RESPIRATION RATE: 18 BRPM | OXYGEN SATURATION: 97 % | TEMPERATURE: 97.1 F | BODY MASS INDEX: 33.26 KG/M2

## 2020-08-04 PROCEDURE — 99213 OFFICE O/P EST LOW 20 MIN: CPT | Performed by: FAMILY MEDICINE

## 2020-08-04 PROCEDURE — 96372 THER/PROPH/DIAG INJ SC/IM: CPT | Performed by: FAMILY MEDICINE

## 2020-08-04 RX ORDER — METHYLPREDNISOLONE 4 MG/1
TABLET ORAL
Qty: 1 KIT | Refills: 0 | Status: SHIPPED | OUTPATIENT
Start: 2020-08-04 | End: 2020-08-10

## 2020-08-04 RX ORDER — DEXAMETHASONE SODIUM PHOSPHATE 4 MG/ML
4 INJECTION, SOLUTION INTRA-ARTICULAR; INTRALESIONAL; INTRAMUSCULAR; INTRAVENOUS; SOFT TISSUE ONCE
Status: COMPLETED | OUTPATIENT
Start: 2020-08-04 | End: 2020-08-04

## 2020-08-04 RX ORDER — TRAMADOL HYDROCHLORIDE 50 MG/1
50 TABLET ORAL EVERY 6 HOURS PRN
COMMUNITY
End: 2020-08-04

## 2020-08-04 RX ORDER — ACETAMINOPHEN 325 MG/1
650 TABLET ORAL EVERY 4 HOURS PRN
Qty: 120 TABLET | Refills: 3 | Status: SHIPPED
Start: 2020-08-04 | End: 2020-08-04 | Stop reason: SDUPTHER

## 2020-08-04 RX ORDER — IBUPROFEN 600 MG/1
600 TABLET ORAL 3 TIMES DAILY PRN
Qty: 90 TABLET | Refills: 5 | Status: SHIPPED
Start: 2020-08-04 | End: 2020-08-04 | Stop reason: SDUPTHER

## 2020-08-04 RX ORDER — CYCLOBENZAPRINE HCL 10 MG
10 TABLET ORAL 3 TIMES DAILY PRN
COMMUNITY
End: 2020-12-16 | Stop reason: ALTCHOICE

## 2020-08-04 RX ORDER — IBUPROFEN 600 MG/1
600 TABLET ORAL 3 TIMES DAILY PRN
Qty: 90 TABLET | Refills: 5 | Status: SHIPPED
Start: 2020-08-04 | End: 2020-12-16 | Stop reason: SDUPTHER

## 2020-08-04 RX ORDER — ACETAMINOPHEN 325 MG/1
650 TABLET ORAL EVERY 4 HOURS PRN
Qty: 120 TABLET | Refills: 3 | Status: SHIPPED
Start: 2020-08-04 | End: 2020-12-16 | Stop reason: SDUPTHER

## 2020-08-04 RX ORDER — METHYLPREDNISOLONE 4 MG/1
TABLET ORAL
Qty: 1 KIT | Refills: 0 | Status: SHIPPED
Start: 2020-08-04 | End: 2020-08-04 | Stop reason: SDUPTHER

## 2020-08-04 RX ADMIN — DEXAMETHASONE SODIUM PHOSPHATE 4 MG: 4 INJECTION, SOLUTION INTRA-ARTICULAR; INTRALESIONAL; INTRAMUSCULAR; INTRAVENOUS; SOFT TISSUE at 11:15

## 2020-08-04 ASSESSMENT — ENCOUNTER SYMPTOMS
WATER BRASH: 0
ABDOMINAL PAIN: 0
PHOTOPHOBIA: 0
COLOR CHANGE: 0
APNEA: 0
GLOBUS SENSATION: 0
SHORTNESS OF BREATH: 0
TROUBLE SWALLOWING: 0
BLURRED VISION: 0
VOICE CHANGE: 0
RECTAL PAIN: 0
EYES NEGATIVE: 1
BACK PAIN: 1
EYE DISCHARGE: 0
HEARTBURN: 0
NAUSEA: 0
WHEEZING: 0
EYE PAIN: 0
HOARSE VOICE: 0
BLOOD IN STOOL: 0
DIARRHEA: 0
EYE ITCHING: 0
EYE REDNESS: 0
SINUS PRESSURE: 0
COUGH: 0
RHINORRHEA: 0
ORTHOPNEA: 0
FACIAL SWELLING: 0
CHEST TIGHTNESS: 0
SINUS PAIN: 0
BELCHING: 0
ABDOMINAL DISTENTION: 0
VOMITING: 0
CHOKING: 0
ALLERGIC/IMMUNOLOGIC NEGATIVE: 1
SORE THROAT: 0
BOWEL INCONTINENCE: 0
CONSTIPATION: 0
STRIDOR: 0
ANAL BLEEDING: 0

## 2020-08-04 NOTE — PROGRESS NOTES
Jim Barthel is a 60 y.o. female. HPI/Chief C/O:  Chief Complaint   Patient presents with    ED Follow-up     Back pain    Discuss Medications     Patient is not taking any medications except for her tramadol and flexeril that she has left over from the hospital.     Allergies   Allergen Reactions    Penicillins Anaphylaxis    Dilaudid [Hydromorphone Hcl] Nausea And Vomiting    Demerol Hcl [Meperidine] Hives    Morphine      Can take alone, not with demerol    Tylox [Oxycodone-Acetaminophen] Hives    Sulfa Antibiotics Rash    Tape [place] Tape] Rash   The patient is here for a medication list and treatment planning review  We will go over our care planning goals as well as take care of all refills  We will set up labs as well   She is post ER for back pain    Back Pain   This is a chronic problem. The current episode started more than 1 year ago. The problem occurs constantly. The problem has been gradually worsening since onset. The pain is present in the lumbar spine. The quality of the pain is described as burning, stabbing and shooting. The pain is at a severity of 9/10. The pain is severe. The pain is the same all the time. Stiffness is present all day. Associated symptoms include leg pain, numbness (into her left leg) and tingling. Pertinent negatives include no abdominal pain, bladder incontinence, bowel incontinence, chest pain, dysuria, fever, headaches, paresis, paresthesias, pelvic pain, perianal numbness, weakness or weight loss. Knee Pain    The incident occurred more than 1 week ago. The pain is present in the left knee and right knee. The quality of the pain is described as shooting and stabbing. The pain is at a severity of 9/10. The pain is severe. The pain has been worsening since onset. Associated symptoms include a loss of motion, muscle weakness, numbness (into her left leg) and tingling. Pertinent negatives include no inability to bear weight or loss of sensation. agitation, behavioral problems, confusion, decreased concentration, dysphoric mood, hallucinations, self-injury, sleep disturbance and suicidal ideas. The patient is not nervous/anxious and is not hyperactive.          Past Medical/Surgical Hx;  Reviewed with patient      Diagnosis Date    Arthritis     knee    Asthma     Cancer ([facility] [address] 07/2007    ovarian    H/O cardiovascular stress test 06/13/2017    lexiscan    Hiatal hernia with gastroesophageal reflux     Hx of cardiovascular stress test 10/2012    lexiscan nuclear stress    Hyperlipidemia     Hypoactive thyroid     no meds    Osteoarthritis     PONV (postoperative nausea and vomiting)     Preoperative clearance 01/09/2017    Medical clearance from Dr. Janette Silva in EPIC notes    Ulcer of gastroesophageal junction      Past Surgical History:   Procedure Laterality Date    CHOLECYSTECTOMY      COLONOSCOPY      FRACTURE SURGERY Right 2005    humerus, hardware in place   [address]  7/18/07    and bso    JOINT REPLACEMENT Left 11/2018    KNEE ARTHROPLASTY Right 02/02/2017    KNEE ARTHROSCOPY  right    LUMBAR LAMINECTOMY  05/23/2014    bilateral lumbar laminectomy L4 and L5 excision of a spinal tumor    NERVE BLOCK N/A 5 15 13    lumb ep #1    NERVE BLOCK N/A 5/29/13    lumbar epidural nerve block #2    NERVE BLOCK  06 12 2013    lumbar epidural #3    TOTAL KNEE ARTHROPLASTY Right     TUBAL LIGATION      UPPER GASTROINTESTINAL ENDOSCOPY         Past Family Hx:  Reviewed with patient      Problem Relation Age of Onset    Emphysema Mother     Migraines Mother     Cancer Mother     Arthritis Other     Cancer Other     Heart Disease Other        Social Hx:  Reviewed with patient  Social History     Tobacco Use    Smoking status: Former Smoker     Packs/day: 0.50     Years: 1.00     Pack years: 0.50     Types: Cigarettes     Start date: 8/6/1977     Last attempt to quit: 8/6/1978 Years since quittin.0    Smokeless tobacco: Never Used    Tobacco comment: quit many years ago   Substance Use Topics    Alcohol use: Yes     Alcohol/week: 2.0 standard drinks     Types: 2 Glasses of wine per week     Comment: social       OBJECTIVE  /78 (Site: Left Upper Arm, Position: Sitting, Cuff Size: Large Adult)   Pulse 90   Temp 97.1 °F (36.2 °C) (Temporal)   Resp 18   Ht 5' 5\" (1.651 m)   Wt 199 lb 9.6 oz (90.5 kg)   LMP  (LMP Unknown)   SpO2 97%   BMI 33.22 kg/m²     Problem List:  Justina Guerrero does not have any pertinent problems on file. PHYS EX:  Physical Exam  Vitals signs and nursing note reviewed. Constitutional:       General: She is not in acute distress. Appearance: Normal appearance. She is well-developed and normal weight. She is not ill-appearing, toxic-appearing or diaphoretic. HENT:      Head: Normocephalic and atraumatic. Right Ear: Tympanic membrane, ear canal and external ear normal. There is no impacted cerumen. Left Ear: Tympanic membrane, ear canal and external ear normal. There is no impacted cerumen. Nose: Nose normal. No congestion or rhinorrhea. Mouth/Throat:      Mouth: Mucous membranes are moist.      Pharynx: No oropharyngeal exudate or posterior oropharyngeal erythema. Eyes:      General: No scleral icterus. Right eye: No discharge. Left eye: No discharge. Extraocular Movements: Extraocular movements intact. Conjunctiva/sclera: Conjunctivae normal.      Pupils: Pupils are equal, round, and reactive to light. Neck:      Musculoskeletal: Normal range of motion and neck supple. No neck rigidity or muscular tenderness. Thyroid: No thyromegaly. Vascular: No carotid bruit or JVD. Trachea: No tracheal deviation. Cardiovascular:      Rate and Rhythm: Normal rate and regular rhythm. Pulses: Normal pulses. Heart sounds: Normal heart sounds. No murmur. No friction rub. No gallop. Pulmonary:      Effort: Pulmonary effort is normal. No respiratory distress. Breath sounds: Normal breath sounds. No stridor. No wheezing, rhonchi or rales. Chest:      Chest wall: No tenderness. Abdominal:      General: Bowel sounds are normal. There is no distension. Palpations: Abdomen is soft. There is no mass. Tenderness: There is no abdominal tenderness. There is no right CVA tenderness, left CVA tenderness, guarding or rebound. Hernia: No hernia is present. Genitourinary:     Vagina: Normal.   Musculoskeletal:         General: Tenderness present. No swelling, deformity or signs of injury. Right knee: She exhibits decreased range of motion and bony tenderness. She exhibits no swelling, no effusion, no ecchymosis, no deformity, no laceration, no erythema, normal alignment, no LCL laxity, normal patellar mobility, normal meniscus and no MCL laxity. Tenderness found. Patellar tendon tenderness noted. No medial joint line, no lateral joint line, no MCL and no LCL tenderness noted. Lumbar back: She exhibits decreased range of motion, tenderness, bony tenderness, pain and spasm. She exhibits no swelling, no edema, no deformity, no laceration and normal pulse. Back:       Right lower leg: No edema. Left lower leg: No edema. Legs:       Comments: Severe left knee pain and limit to ROM   Lymphadenopathy:      Cervical: No cervical adenopathy. Skin:     General: Skin is warm. Coloration: Skin is not jaundiced or pale. Findings: No bruising, erythema, lesion or rash. Neurological:      General: No focal deficit present. Mental Status: She is alert and oriented to person, place, and time. Cranial Nerves: No cranial nerve deficit. Sensory: Sensory deficit (into the left sciatic nerve) present. Motor: No weakness or abnormal muscle tone.       Coordination: Coordination normal.      Gait: Gait normal.      Deep Tendon Reflexes: Reflexes are normal and symmetric. Reflexes normal.      Comments: Radicular pain into her feet         ASSESSMENT/PLAN  Grecia was seen today for ed follow-up and discuss medications. Diagnoses and all orders for this visit:    DDD (degenerative disc disease), lumbar  -     methylPREDNISolone (MEDROL DOSEPACK) 4 MG tablet; Take as directed  -     ibuprofen (ADVIL;MOTRIN) 600 MG tablet; Take 1 tablet by mouth 3 times daily as needed for Pain  -     acetaminophen (AMINOFEN) 325 MG tablet; Take 2 tablets by mouth every 4 hours as needed for Pain  -     dexamethasone (DECADRON) injection 4 mg  --PLAN--inject right and left PSIS x 2                1/2 cc xylocaine plus 1 cc depo medrol                Omt/ultra--Rx        Screening mammogram, encounter for  -     JAMILA DIGITAL SCREEN W OR WO CAD BILATERAL; Future    Screen for colon cancer  -     Cologplacidord (For External Results Only); Future        Outpatient Encounter Medications as of 8/4/2020   Medication Sig Dispense Refill    cyclobenzaprine (FLEXERIL) 10 MG tablet Take 10 mg by mouth 3 times daily as needed for Muscle spasms Patient got this from the hospital and still takes it.  methylPREDNISolone (MEDROL DOSEPACK) 4 MG tablet Take as directed 1 kit 0    ibuprofen (ADVIL;MOTRIN) 600 MG tablet Take 1 tablet by mouth 3 times daily as needed for Pain 90 tablet 5    acetaminophen (AMINOFEN) 325 MG tablet Take 2 tablets by mouth every 4 hours as needed for Pain 120 tablet 3    [DISCONTINUED] traMADol (ULTRAM) 50 MG tablet Take 50 mg by mouth every 6 hours as needed for Pain. Patient got this from the hospital, and is still taking it.       albuterol sulfate HFA (PROAIR HFA) 108 (90 Base) MCG/ACT inhaler Inhale 2 puffs into the lungs every 6 hours as needed for Wheezing 1 Inhaler 0    [DISCONTINUED] acetaminophen (TYLENOL) 325 MG tablet Take 650 mg by mouth every 6 hours as needed for Pain      DULoxetine (CYMBALTA) 30 MG extended release capsule Take 1 capsule by mouth daily (Patient not taking: Reported on 8/4/2020) 90 capsule 1    metoclopramide (REGLAN) 10 MG tablet TAKE 1 TABLET BY MOUTH EVERY DAY (Patient not taking: Reported on 8/4/2020) 90 tablet 1    pantoprazole (PROTONIX) 40 MG tablet TAKE 1 TABLET BY MOUTH EVERY DAY (Patient not taking: Reported on 8/4/2020) 90 tablet 1    rosuvastatin (CRESTOR) 5 MG tablet TAKE 1 TABLET BY MOUTH EVERY DAY (Patient not taking: Reported on 8/4/2020) 90 tablet 1    montelukast (SINGULAIR) 10 MG tablet TAKE 1 TABLET BY MOUTH EVERY DAY (Patient not taking: Reported on 8/4/2020) 90 tablet 1    Menthol, Topical Analgesic, (BIOFREEZE EX) Apply 1 each topically 2 times daily as needed (Joint Pain)      aspirin 81 MG tablet Take 81 mg by mouth 2 times daily      docusate sodium (DULCOLAX) 100 MG capsule Take 1 capsule by mouth 3 times daily as needed. (Patient not taking: Reported on 8/4/2020) 270 capsule 1     Facility-Administered Encounter Medications as of 8/4/2020   Medication Dose Route Frequency Provider Last Rate Last Dose    dexamethasone (DECADRON) injection 4 mg  4 mg Intramuscular Once Leanor Walsh, DO           Return in about 4 weeks (around 9/1/2020).         Reviewed recent labs related to Grecia's current problems      Discussed importance of regular Health Maintenance follow up  Health Maintenance   Topic    Shingles Vaccine (1 of 2)    Colon cancer screen colonoscopy     DTaP/Tdap/Td vaccine (2 - Td)    Cervical cancer screen     Lipid screen     TSH testing     Breast cancer screen     Flu vaccine (1)    Diabetes screen     Pneumococcal 0-64 years Vaccine     Hepatitis C screen     HIV screen     Hepatitis A vaccine     Hepatitis B vaccine     Hib vaccine     Meningococcal (ACWY) vaccine

## 2020-08-04 NOTE — PATIENT INSTRUCTIONS
Patient Education        Back Pain and Sex: Care Instructions  Overview     It's common to be afraid to have sex when you have back pain. But you can still have a satisfying sex life. Talk to your partner about which movements are comfortable for you and which aren't. If the thought of having pain during sex worries you, talk about that too. Follow-up care is a key part of your treatment and safety. Be sure to make and go to all appointments, and call your doctor if you are having problems. It's also a good idea to know your test results and keep a list of the medicines you take. How can you care for yourself at home? · Learn which sexual positions may be good or bad for your back. For example, some back problems cause pain when you bend forward. Others cause problems when you arch your back. · Try positions you've never considered before. You may need to use a firmer surface than your mattress, such as a soft rug on the floor or even a sturdy chair. Oral sex might be easier than intercourse. · Go slow. Sex is like exercise--warming up and stretching first are important. Many people use yoga to gently stretch their muscles. When you're ready to have sex, keep your movements slow and gentle. · Take a hot shower to help relax your muscles. Or have your partner give you a massage. · Increase the time you and your partner spend touching and caressing each other before sex (foreplay). · If it hurts, stop. That may seem obvious, but when things get passionate, it can be hard to stay in control. Try to keep it slow so that you can stop right away if your back starts to hurt. When should you call for help? Watch closely for changes in your health, and be sure to contact your doctor if you have any problems. Where can you learn more? Go to [email or web address] and sign in to your "[employer] account.  Enter W741 in the 27 bards box to learn more about \"Back Pain and Sex: Care

## 2020-08-14 NOTE — PROGRESS NOTES
Overdue results letter mailed to patient regarding Cologuard order.   Electronically signed by Santy Duff on 8/14/2020 at 2:20 PM

## 2020-09-09 ENCOUNTER — TELEPHONE (OUTPATIENT)
Dept: PRIMARY CARE CLINIC | Age: 55
End: 2020-09-09

## 2020-09-11 ENCOUNTER — TELEPHONE (OUTPATIENT)
Dept: PRIMARY CARE CLINIC | Age: 55
End: 2020-09-11

## 2020-09-15 ENCOUNTER — TELEPHONE (OUTPATIENT)
Dept: FAMILY MEDICINE CLINIC | Age: 55
End: 2020-09-15

## 2020-09-15 NOTE — TELEPHONE ENCOUNTER
Spoke to pt regarding overdue result for COLOGUARD. Pt stated she do not receive the Althia Kendrick stated she does not  have any health insurance.

## 2020-10-13 NOTE — PROGRESS NOTES
Overdue results letter mailed to patient regarding mammogram order  Electronically signed by Libertad Pablo on 10/13/2020 at 10:37 AM

## 2020-12-16 ENCOUNTER — OFFICE VISIT (OUTPATIENT)
Dept: FAMILY MEDICINE CLINIC | Age: 55
End: 2020-12-16
Payer: COMMERCIAL

## 2020-12-16 VITALS
TEMPERATURE: 97.7 F | DIASTOLIC BLOOD PRESSURE: 60 MMHG | OXYGEN SATURATION: 97 % | WEIGHT: 203 LBS | SYSTOLIC BLOOD PRESSURE: 100 MMHG | HEART RATE: 81 BPM | HEIGHT: 66 IN | BODY MASS INDEX: 32.62 KG/M2 | RESPIRATION RATE: 18 BRPM

## 2020-12-16 PROCEDURE — 99213 OFFICE O/P EST LOW 20 MIN: CPT | Performed by: FAMILY MEDICINE

## 2020-12-16 PROCEDURE — G8427 DOCREV CUR MEDS BY ELIG CLIN: HCPCS | Performed by: FAMILY MEDICINE

## 2020-12-16 PROCEDURE — G8417 CALC BMI ABV UP PARAM F/U: HCPCS | Performed by: FAMILY MEDICINE

## 2020-12-16 PROCEDURE — 3017F COLORECTAL CA SCREEN DOC REV: CPT | Performed by: FAMILY MEDICINE

## 2020-12-16 PROCEDURE — G8484 FLU IMMUNIZE NO ADMIN: HCPCS | Performed by: FAMILY MEDICINE

## 2020-12-16 PROCEDURE — 1036F TOBACCO NON-USER: CPT | Performed by: FAMILY MEDICINE

## 2020-12-16 RX ORDER — IBUPROFEN 600 MG/1
600 TABLET ORAL 3 TIMES DAILY PRN
Qty: 90 TABLET | Refills: 5 | Status: SHIPPED
Start: 2020-12-16 | End: 2021-07-26 | Stop reason: SDUPTHER

## 2020-12-16 RX ORDER — ACETAMINOPHEN 325 MG/1
650 TABLET ORAL EVERY 4 HOURS PRN
Qty: 120 TABLET | Refills: 3 | Status: SHIPPED
Start: 2020-12-16 | End: 2021-02-15

## 2020-12-16 ASSESSMENT — ENCOUNTER SYMPTOMS
COLOR CHANGE: 0
ABDOMINAL DISTENTION: 0
SINUS PRESSURE: 0
WATER BRASH: 0
CONSTIPATION: 0
GLOBUS SENSATION: 0
BOWEL INCONTINENCE: 0
EYE DISCHARGE: 0
RECTAL PAIN: 0
APNEA: 0
ANAL BLEEDING: 0
PHOTOPHOBIA: 0
EYE ITCHING: 0
SHORTNESS OF BREATH: 0
BACK PAIN: 1
SORE THROAT: 0
ALLERGIC/IMMUNOLOGIC NEGATIVE: 1
COUGH: 0
HOARSE VOICE: 0
EYE PAIN: 0
HEARTBURN: 0
BELCHING: 0
STRIDOR: 0
TROUBLE SWALLOWING: 0
EYE REDNESS: 0
ORTHOPNEA: 0
CHOKING: 0
NAUSEA: 0
RHINORRHEA: 0
WHEEZING: 0
VOMITING: 0
BLOOD IN STOOL: 0
EYES NEGATIVE: 1
DIARRHEA: 0
FACIAL SWELLING: 0
VOICE CHANGE: 0
SINUS PAIN: 0
ABDOMINAL PAIN: 0
BLURRED VISION: 0
CHEST TIGHTNESS: 0

## 2020-12-16 NOTE — PROGRESS NOTES
Gt Polanco is a 60 y.o. female. HPI/Chief C/O:  Chief Complaint   Patient presents with    Back Pain     Coinstant lower back pain x many months. Allergies   Allergen Reactions    Penicillins Anaphylaxis    Dilaudid [Hydromorphone Hcl] Nausea And Vomiting    Demerol Hcl [Meperidine] Hives    Morphine      Can take alone, not with demerol    Sulfa Antibiotics Rash    Tape [facility] Tape] Rash   The patient is here for a medication list and treatment planning review  We will go over our care planning goals as well as take care of all refills  We will set up labs as well     Back Pain  This is a chronic problem. The current episode started more than 1 year ago. The problem occurs constantly. The problem has been gradually worsening since onset. The pain is present in the lumbar spine. The quality of the pain is described as burning, stabbing and shooting. The pain is at a severity of 9/10. The pain is severe. The pain is the same all the time. Stiffness is present all day. Associated symptoms include leg pain, numbness (into her left leg) and tingling. Pertinent negatives include no abdominal pain, bowel incontinence, chest pain, dysuria, fever, headaches, paresis, paresthesias, pelvic pain, perianal numbness, weakness or weight loss. Knee Pain   The incident occurred more than 1 week ago. The pain is present in the left knee and right knee. The quality of the pain is described as shooting and stabbing. The pain is at a severity of 9/10. The pain is severe. The pain has been worsening since onset. Associated symptoms include a loss of motion, muscle weakness, numbness (into her left leg) and tingling. Pertinent negatives include no inability to bear weight or loss of sensation.    Gastroesophageal Reflux  She reports no abdominal pain, no belching, no chest pain, no choking, no coughing, no dysphagia, no early satiety, no globus sensation, no heartburn, no hoarse voice, no nausea, no sore throat, no stridor, no tooth decay, no water brash or no wheezing. This is a recurrent problem. The current episode started more than 1 year ago. The problem occurs frequently. The problem has been gradually worsening. Associated symptoms include fatigue. Pertinent negatives include no anemia, melena, muscle weakness, orthopnea or weight loss. She has tried a PPI and a pro-motility drug for the symptoms. The treatment provided moderate relief. Past procedures include an EGD. Past procedures do not include an abdominal ultrasound, esophageal manometry, esophageal pH monitoring, H. pylori antibody titer or a UGI. Past invasive treatments do not include gastroplasty, gastroplication or reflux surgery. Hypertension  Associated symptoms include anxiety, malaise/fatigue and neck pain. Pertinent negatives include no blurred vision, chest pain, headaches, orthopnea, palpitations, peripheral edema, PND, shortness of breath or sweats. Agents associated with hypertension include NSAIDs. Risk factors for coronary artery disease include dyslipidemia, family history, post-menopausal state and obesity. Past treatments include lifestyle changes. The current treatment provides significant improvement. Compliance problems include psychosocial issues, exercise and diet. There is no history of angina, kidney disease, CAD/MI, CVA, heart failure, left ventricular hypertrophy, PVD or retinopathy. Identifiable causes of hypertension include a thyroid problem. There is no history of chronic renal disease, coarctation of the aorta, hyperaldosteronism, hypercortisolism, hyperparathyroidism, a hypertension causing med, pheochromocytoma, renovascular disease or sleep apnea. ROS:  Review of Systems   Constitutional: Positive for fatigue and malaise/fatigue. Negative for activity change, appetite change, chills, diaphoresis, fever, unexpected weight change and weight loss.    HENT: Negative for congestion, dental problem, drooling, ear discharge, ear pain, facial swelling, hearing loss, hoarse voice, mouth sores, nosebleeds, postnasal drip, rhinorrhea, sinus pressure, sinus pain, sneezing, sore throat, tinnitus, trouble swallowing and voice change. Eyes: Negative. Negative for blurred vision, photophobia, pain, discharge, redness, itching and visual disturbance. Respiratory: Negative for apnea, cough, choking, chest tightness, shortness of breath, wheezing and stridor. Cardiovascular: Negative. Negative for chest pain, palpitations, orthopnea, leg swelling and PND. Gastrointestinal: Negative for abdominal distention, abdominal pain, anal bleeding, blood in stool, bowel incontinence, constipation, diarrhea, dysphagia, heartburn, melena, nausea, rectal pain and vomiting. Endocrine: Negative. Negative for cold intolerance, heat intolerance, polydipsia, polyphagia and polyuria. Genitourinary: Negative. Negative for decreased urine volume, difficulty urinating, dyspareunia, dysuria, enuresis, flank pain, frequency, genital sores, hematuria, menstrual problem, pelvic pain and urgency. Musculoskeletal: Positive for arthralgias, back pain, myalgias and neck pain. Negative for gait problem, joint swelling, muscle weakness and neck stiffness. Skin: Negative. Negative for color change, pallor, rash and wound. Allergic/Immunologic: Negative. Neurological: Positive for tingling and numbness (into her left leg). Negative for dizziness, tremors, seizures, syncope, facial asymmetry, speech difficulty, weakness, light-headedness, headaches and paresthesias. Hematological: Negative. Negative for adenopathy. Does not bruise/bleed easily. Psychiatric/Behavioral: Negative. Negative for agitation, behavioral problems, confusion, decreased concentration, dysphoric mood, hallucinations, self-injury, sleep disturbance and suicidal ideas. The patient is not nervous/anxious and is not hyperactive.          Past Medical/Surgical Hx;  Reviewed with patient      Diagnosis Date    Arthritis     knee    Asthma     Cancer ([facility] [address] 2007    ovarian    H/O cardiovascular stress test 2017    lexiscan    Hiatal hernia with gastroesophageal reflux     Hx of cardiovascular stress test 10/2012    lexiscan nuclear stress    Hyperlipidemia     Hypoactive thyroid     no meds    Osteoarthritis     PONV (postoperative nausea and vomiting)     Preoperative clearance 2017    Medical clearance from Dr. Andreas Plata in EPIC notes    Ulcer of gastroesophageal junction      Past Surgical History:   Procedure Laterality Date    CHOLECYSTECTOMY      COLONOSCOPY      FRACTURE SURGERY Right     humerus, hardware in place    [facility] [address]    and bso    JOINT REPLACEMENT Left 2018    KNEE ARTHROPLASTY Right 2017    KNEE ARTHROSCOPY  right    LUMBAR LAMINECTOMY  2014    bilateral lumbar laminectomy L4 and L5 excision of a spinal tumor    NERVE BLOCK N/A 5 15 13    lumb ep #1    NERVE BLOCK N/A 13    lumbar epidural nerve block #2    NERVE BLOCK  2013    lumbar epidural #3    TOTAL KNEE ARTHROPLASTY Right     TUBAL LIGATION      UPPER GASTROINTESTINAL ENDOSCOPY         Past Family Hx:  Reviewed with patient      Problem Relation Age of Onset    Emphysema Mother     Migraines Mother     Cancer Mother     Arthritis Other     Cancer Other     Heart Disease Other        Social Hx:  Reviewed with patient  Social History     Tobacco Use    Smoking status: Former Smoker     Packs/day: 0.50     Years: 1.00     Pack years: 0.50     Types: Cigarettes     Start date: 1977     Quit date: 1978     Years since quittin.3    Smokeless tobacco: Never Used    Tobacco comment: quit many years ago   Substance Use Topics    Alcohol use:  Yes     Alcohol/week: 2.0 standard drinks     Types: 2 Glasses of wine per week     Comment: social OBJECTIVE  /60 (Site: Left Upper Arm, Position: Sitting, Cuff Size: Large Adult)   Pulse 81   Temp 97.7 °F (36.5 °C) (Temporal)   Resp 18   Ht 5' 5.5\" (1.664 m)   Wt 203 lb (92.1 kg)   LMP  (LMP Unknown)   SpO2 97%   BMI 33.27 kg/m²     Problem List:  Jenni Lowe does not have any pertinent problems on file. PHYS EX:  Physical Exam  Vitals signs and nursing note reviewed. Constitutional:       General: She is not in acute distress. Appearance: Normal appearance. She is well-developed. She is obese. She is not ill-appearing, toxic-appearing or diaphoretic. HENT:      Head: Normocephalic and atraumatic. Right Ear: External ear normal. There is no impacted cerumen. Left Ear: External ear normal. There is no impacted cerumen. Nose: Nose normal. No congestion or rhinorrhea. Mouth/Throat:      Mouth: Mucous membranes are moist.      Pharynx: No oropharyngeal exudate or posterior oropharyngeal erythema. Eyes:      General: No scleral icterus. Right eye: No discharge. Left eye: No discharge. Extraocular Movements: Extraocular movements intact. Conjunctiva/sclera: Conjunctivae normal.      Pupils: Pupils are equal, round, and reactive to light. Neck:      Musculoskeletal: Normal range of motion and neck supple. No neck rigidity or muscular tenderness. Thyroid: No thyromegaly. Vascular: No carotid bruit or JVD. Trachea: No tracheal deviation. Cardiovascular:      Rate and Rhythm: Normal rate and regular rhythm. Pulses: Normal pulses. Heart sounds: Normal heart sounds. No murmur. No friction rub. No gallop. Pulmonary:      Effort: Pulmonary effort is normal. No respiratory distress. Breath sounds: Normal breath sounds. No stridor. No wheezing, rhonchi or rales. Chest:      Chest wall: No tenderness. Abdominal:      General: Bowel sounds are normal. There is no distension. Palpations: Abdomen is soft.  There is no mass. Tenderness: There is no abdominal tenderness. There is no right CVA tenderness, left CVA tenderness, guarding or rebound. Hernia: No hernia is present. Musculoskeletal:         General: Tenderness present. No swelling, deformity or signs of injury. Right knee: She exhibits decreased range of motion and bony tenderness. She exhibits no swelling, no effusion, no ecchymosis, no deformity, no laceration, no erythema, normal alignment, no LCL laxity, normal patellar mobility, normal meniscus and no MCL laxity. Tenderness found. Patellar tendon tenderness noted. No medial joint line, no lateral joint line, no MCL and no LCL tenderness noted. Lumbar back: She exhibits decreased range of motion, tenderness, bony tenderness, pain and spasm. She exhibits no swelling, no edema, no deformity, no laceration and normal pulse. Back:       Right lower leg: No edema. Left lower leg: No edema. Legs:       Comments: Severe left knee pain and limit to ROM   Lymphadenopathy:      Cervical: No cervical adenopathy. Skin:     General: Skin is warm. Coloration: Skin is not jaundiced or pale. Findings: No bruising, erythema, lesion or rash. Neurological:      General: No focal deficit present. Mental Status: She is alert and oriented to person, place, and time. Cranial Nerves: No cranial nerve deficit. Sensory: Sensory deficit (into the left sciatic nerve) present. Motor: No weakness or abnormal muscle tone. Coordination: Coordination normal.      Gait: Gait normal.      Deep Tendon Reflexes: Reflexes are normal and symmetric. Reflexes normal.      Comments: Radicular pain into her feet         ASSESSMENT/PLAN  Grecia was seen today for back pain. Diagnoses and all orders for this visit:    Sciatic neuropathy, left  -     MRI LUMBAR SPINE WO CONTRAST; Future  -     Comprehensive Metabolic Panel;  Future  -     CBC Auto Differential; Future  --PLAN--inject right and left PSIS x 2                1/2 cc xylocaine plus 1 cc depo medrol                Omt/ultra--Rx        Hip pain  -     XR HIP BILATERAL W AP PELVIS (2 VIEWS); Future  -     Comprehensive Metabolic Panel; Future  -     CBC Auto Differential; Future    DDD (degenerative disc disease), lumbar  -     ibuprofen (ADVIL;MOTRIN) 600 MG tablet; Take 1 tablet by mouth 3 times daily as needed for Pain  -     acetaminophen (AMINOFEN) 325 MG tablet; Take 2 tablets by mouth every 4 hours as needed for Pain  -     Comprehensive Metabolic Panel; Future  -     CBC Auto Differential; Future    Screen for colon cancer  -     Cologuard (For External Results Only); Future  -     Comprehensive Metabolic Panel; Future  -     CBC Auto Differential; Future    Screening mammogram, encounter for  -     Comprehensive Metabolic Panel; Future  -     CBC Auto Differential; Future  -     JAMILA DIGITAL SCREEN W OR WO CAD BILATERAL; Future    Fatigue, unspecified type  -     TSH without Reflex; Future  -     Uric Acid; Future  -     Comprehensive Metabolic Panel; Future  -     CBC Auto Differential; Future    IFG (impaired fasting glucose)  -     Comprehensive Metabolic Panel; Future  -     CBC Auto Differential; Future  -     Hemoglobin A1C; Future    Dyslipidemia  -     Comprehensive Metabolic Panel; Future  -     Lipid Panel; Future  -     CBC Auto Differential; Future        Outpatient Encounter Medications as of 12/16/2020   Medication Sig Dispense Refill    ibuprofen (ADVIL;MOTRIN) 600 MG tablet Take 1 tablet by mouth 3 times daily as needed for Pain 90 tablet 5    acetaminophen (AMINOFEN) 325 MG tablet Take 2 tablets by mouth every 4 hours as needed for Pain 120 tablet 3    [DISCONTINUED] cyclobenzaprine (FLEXERIL) 10 MG tablet Take 10 mg by mouth 3 times daily as needed for Muscle spasms Patient got this from the hospital and still takes it.       [DISCONTINUED] ibuprofen (ADVIL;MOTRIN) 600 MG tablet Take 1 tablet by mouth 3 times daily as needed for Pain 90 tablet 5    [DISCONTINUED] acetaminophen (AMINOFEN) 325 MG tablet Take 2 tablets by mouth every 4 hours as needed for Pain 120 tablet 3    albuterol sulfate HFA (PROAIR HFA) 108 (90 Base) MCG/ACT inhaler Inhale 2 puffs into the lungs every 6 hours as needed for Wheezing 1 Inhaler 0    DULoxetine (CYMBALTA) 30 MG extended release capsule Take 1 capsule by mouth daily (Patient not taking: Reported on 8/4/2020) 90 capsule 1    metoclopramide (REGLAN) 10 MG tablet TAKE 1 TABLET BY MOUTH EVERY DAY (Patient not taking: Reported on 8/4/2020) 90 tablet 1    pantoprazole (PROTONIX) 40 MG tablet TAKE 1 TABLET BY MOUTH EVERY DAY (Patient not taking: Reported on 8/4/2020) 90 tablet 1    rosuvastatin (CRESTOR) 5 MG tablet TAKE 1 TABLET BY MOUTH EVERY DAY (Patient not taking: Reported on 8/4/2020) 90 tablet 1    montelukast (SINGULAIR) 10 MG tablet TAKE 1 TABLET BY MOUTH EVERY DAY (Patient not taking: Reported on 8/4/2020) 90 tablet 1    Menthol, Topical Analgesic, (BIOFREEZE EX) Apply 1 each topically 2 times daily as needed (Joint Pain)      aspirin 81 MG tablet Take 81 mg by mouth 2 times daily      docusate sodium (DULCOLAX) 100 MG capsule Take 1 capsule by mouth 3 times daily as needed. (Patient not taking: Reported on 8/4/2020) 270 capsule 1     No facility-administered encounter medications on file as of 12/16/2020. Return in about 4 weeks (around 1/13/2021).         Reviewed recent labs related to Grecia's current problems      Discussed importance of regular Health Maintenance follow up  Health Maintenance   Topic    Shingles Vaccine (1 of 2)    Colon cancer screen colonoscopy     DTaP/Tdap/Td vaccine (2 - Td)    Cervical cancer screen     Lipid screen     TSH testing     Breast cancer screen     Diabetes screen     Flu vaccine     Pneumococcal 0-64 years Vaccine     Hepatitis C screen     HIV screen     Hepatitis A vaccine     Hepatitis B vaccine     Hib vaccine     Meningococcal (ACWY) vaccine

## 2020-12-16 NOTE — PATIENT INSTRUCTIONS
Patient Education        Herniated Disc: Exercises  Introduction  Here are some examples of exercises for you to try. The exercises may be suggested for a condition or for rehabilitation. Start each exercise slowly. Ease off the exercises if you start to have pain. You will be told when to start these exercises and which ones will work best for you. How to stay safe  These exercises can help you move easier and feel better. But when you first start doing them, you may have more pain in your back. This is normal. But it is important to pay close attention to your pain during and after each exercise. · Keep doing these exercises if your pain stays the same or moves from your leg and buttock more toward the middle of your spine. Pain moving out of your leg and buttock is a good sign. · Stop doing these exercises if your pain gets worse in your leg and buttock. Stop if you start to have pain in your leg and buttock that you didn't have before. Be sure to do these exercises in the order they appear. Note how your pain changes before you move to the next one. If your pain is much worse right after exercise and stays worse the next day, do not do any of these exercises. How to do the exercises  1. Rest on belly   1. Lie on your stomach, with your head turned to the side. 2. Try to relax your lower back muscles as much as you can. 3. Continue to lie on your stomach for 2 minutes. · Keep your arms beside your body. · If that position bothers your neck, place your hands, one on top of the other, underneath your forehead. This will help support your head and neck. If lying in this position causes or increases pain down your leg, stop this exercise and do not do the next exercises. 2. Press-up back extension   1. Lie on your stomach, with your face down and your elbows tucked into your sides and under your shoulders. 2. Press your elbows down into the floor to raise your upper back. 3. Hold this position for 15 to 30 seconds. 4. Repeat 2 to 4 times. · As you do this, relax your stomach muscles and allow your back to arch without using your back muscles. · Let your low back relax completely as you arch up. Don't let your hips or pelvis come off the floor. Then relax, and return to the start position. Over time, work up to staying in the press-up position for up to 2 minutes. If lying in this position causes or increases pain down your leg, stop this exercise and do not do the next exercises. 3. Full press-up back extension   1. Lie on your stomach with your face down, keeping your elbows tucked into your sides and under your shoulders. 2. Straighten your elbows, and push your upper body up as far as you can. 3. Hold this position for 5 seconds, and then relax. 4. Repeat 10 times. Allow your lower back to sag. Keep your hips, pelvis, and legs relaxed. Each time, try to raise your upper body a little higher and hold your arms a bit straighter. If lying in this position causes or increases pain down your leg, stop this exercise and do not do the next exercises. If you can't do this exercise, you may instead try the backward bend exercise that follows. 4. Backward bend   1. Stand with your feet hip-width apart, and don't lock your knees. 2. Place your hands in the small of your back. 3. Bend backward as far as you can, keeping your knees straight. 4. Repeat 2 to 4 times. Your toes should be pointing forward. Hold this position for 2 to 3 seconds. Then return to your starting position. Each time, try to bend backward a little farther, until you bend backward as far as you can. If standing in this position causes or increases pain down your leg, stop this exercise. Follow-up care is a key part of your treatment and safety. Be sure to make and go to all appointments, and call your doctor if you are having problems. It's also a good idea to know your test results and keep a list of the medicines you take. Where can you learn more? Go to [email or web address]://[email or web address] org and sign in to your [employer] account. Enter U598 in the Corebook box to learn more about \"Herniated Disc: Exercises. \"     If you do not have an account, please click on the \"Sign Up Now\" link. Current as of: March 2, 2020               Content Version: 12.6  © 0536-3077 [employer]. Care instructions adapted under license by [facility] ([facility]). If you have questions about a medical condition or this instruction, always ask your healthcare professional. [address] any warranty or liability for your use of this information.

## 2020-12-29 NOTE — PROGRESS NOTES
Overdue results letter mailed to patient regarding cologuard order.   Electronically signed by Timi Sierra on 12/29/2020 at 3:01 PM

## 2021-01-05 ENCOUNTER — HOSPITAL ENCOUNTER (OUTPATIENT)
Age: 56
Discharge: HOME | End: 2021-01-05
Payer: COMMERCIAL

## 2021-01-05 ENCOUNTER — HOSPITAL ENCOUNTER (OUTPATIENT)
Dept: GENERAL RADIOLOGY | Age: 56
Discharge: HOME | End: 2021-01-07
Payer: COMMERCIAL

## 2021-01-05 ENCOUNTER — HOSPITAL ENCOUNTER (OUTPATIENT)
Dept: MRI IMAGING | Age: 56
Discharge: HOME | End: 2021-01-07
Payer: COMMERCIAL

## 2021-01-05 ENCOUNTER — HOSPITAL ENCOUNTER (OUTPATIENT)
Age: 56
Discharge: HOME | End: 2021-01-07
Payer: COMMERCIAL

## 2021-01-05 ENCOUNTER — HOSPITAL ENCOUNTER (OUTPATIENT)
Dept: MAMMOGRAPHY | Age: 56
Discharge: HOME | End: 2021-01-07
Payer: COMMERCIAL

## 2021-01-05 DIAGNOSIS — M25.559 HIP PAIN: ICD-10-CM

## 2021-01-05 DIAGNOSIS — R53.83 FATIGUE, UNSPECIFIED TYPE: ICD-10-CM

## 2021-01-05 DIAGNOSIS — M51.36 DDD (DEGENERATIVE DISC DISEASE), LUMBAR: ICD-10-CM

## 2021-01-05 DIAGNOSIS — Z12.11 SCREEN FOR COLON CANCER: ICD-10-CM

## 2021-01-05 DIAGNOSIS — R73.01 IFG (IMPAIRED FASTING GLUCOSE): ICD-10-CM

## 2021-01-05 DIAGNOSIS — Z12.31 SCREENING MAMMOGRAM, ENCOUNTER FOR: ICD-10-CM

## 2021-01-05 DIAGNOSIS — G57.02 SCIATIC NEUROPATHY, LEFT: ICD-10-CM

## 2021-01-05 DIAGNOSIS — E78.5 DYSLIPIDEMIA: ICD-10-CM

## 2021-01-05 LAB
ALBUMIN SERPL-MCNC: 4.4 G/DL (ref 3.5–5.2)
ALP BLD-CCNC: 98 U/L (ref 35–104)
ALT SERPL-CCNC: 9 U/L (ref 0–32)
ANION GAP SERPL CALCULATED.3IONS-SCNC: 10 MMOL/L (ref 7–16)
AST SERPL-CCNC: 20 U/L (ref 0–31)
BASOPHILS ABSOLUTE: 0.02 E9/L (ref 0–0.2)
BASOPHILS RELATIVE PERCENT: 0.6 % (ref 0–2)
BILIRUB SERPL-MCNC: 0.3 MG/DL (ref 0–1.2)
BUN BLDV-MCNC: 15 MG/DL (ref 6–20)
CALCIUM SERPL-MCNC: 9.5 MG/DL (ref 8.6–10.2)
CHLORIDE BLD-SCNC: 103 MMOL/L (ref 98–107)
CHOLESTEROL, TOTAL: 236 MG/DL (ref 0–199)
CO2: 27 MMOL/L (ref 22–29)
CREAT SERPL-MCNC: 1 MG/DL (ref 0.5–1)
EOSINOPHILS ABSOLUTE: 0.06 E9/L (ref 0.05–0.5)
EOSINOPHILS RELATIVE PERCENT: 1.7 % (ref 0–6)
GFR AFRICAN AMERICAN: >60
GFR NON-AFRICAN AMERICAN: 57 ML/MIN/1.73
GLUCOSE BLD-MCNC: 100 MG/DL (ref 74–99)
HBA1C MFR BLD: 5.6 % (ref 4–5.6)
HCT VFR BLD CALC: 38 % (ref 34–48)
HDLC SERPL-MCNC: 63 MG/DL
HEMOGLOBIN: 12.7 G/DL (ref 11.5–15.5)
IMMATURE GRANULOCYTES #: 0.01 E9/L
IMMATURE GRANULOCYTES %: 0.3 % (ref 0–5)
LDL CHOLESTEROL CALCULATED: 149 MG/DL (ref 0–99)
LYMPHOCYTES ABSOLUTE: 0.95 E9/L (ref 1.5–4)
LYMPHOCYTES RELATIVE PERCENT: 26.5 % (ref 20–42)
MCH RBC QN AUTO: 30.4 PG (ref 26–35)
MCHC RBC AUTO-ENTMCNC: 33.4 % (ref 32–34.5)
MCV RBC AUTO: 90.9 FL (ref 80–99.9)
MONOCYTES ABSOLUTE: 0.36 E9/L (ref 0.1–0.95)
MONOCYTES RELATIVE PERCENT: 10 % (ref 2–12)
NEUTROPHILS ABSOLUTE: 2.19 E9/L (ref 1.8–7.3)
NEUTROPHILS RELATIVE PERCENT: 60.9 % (ref 43–80)
PDW BLD-RTO: 13.3 FL (ref 11.5–15)
PLATELET # BLD: 226 E9/L (ref 130–450)
PMV BLD AUTO: 11.5 FL (ref 7–12)
POTASSIUM SERPL-SCNC: 4.7 MMOL/L (ref 3.5–5)
RBC # BLD: 4.18 E12/L (ref 3.5–5.5)
SODIUM BLD-SCNC: 140 MMOL/L (ref 132–146)
TOTAL PROTEIN: 7.8 G/DL (ref 6.4–8.3)
TRIGL SERPL-MCNC: 118 MG/DL (ref 0–149)
TSH SERPL DL<=0.05 MIU/L-ACNC: 1.35 UIU/ML (ref 0.27–4.2)
URIC ACID, SERUM: 5.1 MG/DL (ref 2.4–5.7)
VLDLC SERPL CALC-MCNC: 24 MG/DL
WBC # BLD: 3.6 E9/L (ref 4.5–11.5)

## 2021-01-05 PROCEDURE — 84443 ASSAY THYROID STIM HORMONE: CPT

## 2021-01-05 PROCEDURE — 72148 MRI LUMBAR SPINE W/O DYE: CPT

## 2021-01-05 PROCEDURE — 84550 ASSAY OF BLOOD/URIC ACID: CPT

## 2021-01-05 PROCEDURE — 73521 X-RAY EXAM HIPS BI 2 VIEWS: CPT

## 2021-01-05 PROCEDURE — 80061 LIPID PANEL: CPT

## 2021-01-05 PROCEDURE — 80053 COMPREHEN METABOLIC PANEL: CPT

## 2021-01-05 PROCEDURE — 85025 COMPLETE CBC W/AUTO DIFF WBC: CPT

## 2021-01-05 PROCEDURE — 77067 SCR MAMMO BI INCL CAD: CPT

## 2021-01-05 PROCEDURE — 83036 HEMOGLOBIN GLYCOSYLATED A1C: CPT

## 2021-01-05 PROCEDURE — 36415 COLL VENOUS BLD VENIPUNCTURE: CPT

## 2021-01-06 ENCOUNTER — CLINICAL DOCUMENTATION (OUTPATIENT)
Dept: FAMILY MEDICINE CLINIC | Age: 56
End: 2021-01-06

## 2021-01-06 NOTE — PROGRESS NOTES
The 10-year ASCVD risk score (Sarahi Mckeon et al., 2013) is: 1.6%    Values used to calculate the score:      Age: 60 years      Sex: Female      Is Non- : No      Diabetic: No      Tobacco smoker: No      Systolic Blood Pressure: 121 mmHg      Is BP treated: No      HDL Cholesterol: 63 mg/dL      Total Cholesterol: 236 mg/dL

## 2021-01-12 ENCOUNTER — VIRTUAL VISIT (OUTPATIENT)
Dept: FAMILY MEDICINE CLINIC | Age: 56
End: 2021-01-12
Payer: COMMERCIAL

## 2021-01-12 DIAGNOSIS — G89.29 CHRONIC BILATERAL LOW BACK PAIN WITH LEFT-SIDED SCIATICA: ICD-10-CM

## 2021-01-12 DIAGNOSIS — M54.42 CHRONIC BILATERAL LOW BACK PAIN WITH LEFT-SIDED SCIATICA: ICD-10-CM

## 2021-01-12 DIAGNOSIS — M51.36 DDD (DEGENERATIVE DISC DISEASE), LUMBAR: Primary | ICD-10-CM

## 2021-01-12 PROCEDURE — G8484 FLU IMMUNIZE NO ADMIN: HCPCS | Performed by: FAMILY MEDICINE

## 2021-01-12 PROCEDURE — 3017F COLORECTAL CA SCREEN DOC REV: CPT | Performed by: FAMILY MEDICINE

## 2021-01-12 PROCEDURE — 99213 OFFICE O/P EST LOW 20 MIN: CPT | Performed by: FAMILY MEDICINE

## 2021-01-12 PROCEDURE — G8417 CALC BMI ABV UP PARAM F/U: HCPCS | Performed by: FAMILY MEDICINE

## 2021-01-12 PROCEDURE — G8427 DOCREV CUR MEDS BY ELIG CLIN: HCPCS | Performed by: FAMILY MEDICINE

## 2021-01-12 PROCEDURE — 1036F TOBACCO NON-USER: CPT | Performed by: FAMILY MEDICINE

## 2021-01-12 ASSESSMENT — ENCOUNTER SYMPTOMS
EYE REDNESS: 0
EYE ITCHING: 0
SINUS PAIN: 0
ABDOMINAL PAIN: 0
TROUBLE SWALLOWING: 0
FACIAL SWELLING: 0
EYE PAIN: 0
WHEEZING: 0
ABDOMINAL DISTENTION: 0
WATER BRASH: 0
GLOBUS SENSATION: 0
CHEST TIGHTNESS: 0
ALLERGIC/IMMUNOLOGIC NEGATIVE: 1
ANAL BLEEDING: 0
EYE DISCHARGE: 0
BACK PAIN: 1
PHOTOPHOBIA: 0
SORE THROAT: 0
EYES NEGATIVE: 1
ORTHOPNEA: 0
COUGH: 0
CONSTIPATION: 0
CHOKING: 0
NAUSEA: 0
APNEA: 0
HOARSE VOICE: 0
BOWEL INCONTINENCE: 0
VOMITING: 0
VOICE CHANGE: 0
RHINORRHEA: 0
SINUS PRESSURE: 0
STRIDOR: 0
COLOR CHANGE: 0
SHORTNESS OF BREATH: 0
HEARTBURN: 0
BELCHING: 0
RECTAL PAIN: 0
BLOOD IN STOOL: 0
BLURRED VISION: 0
DIARRHEA: 0

## 2021-01-12 NOTE — PROGRESS NOTES
Jaison Leonard is a 60 y.o. female. HPI/Chief C/O:  Chief Complaint   Patient presents with    Results     discuss MRI results    Back Pain     Allergies   Allergen Reactions    Penicillins Anaphylaxis    Dilaudid [Hydromorphone Hcl] Nausea And Vomiting    Demerol Hcl [Meperidine] Hives    Morphine      Can take alone, not with demerol    Sulfa Antibiotics Rash    Tape Glendy Ovens Tape] Rash     TeleMedicine Video Visit    This visit was performed as a virtual video visit using a synchronous, two-way, audio-video telehealth technology platform. Patient identification was verified at the start of the visit, including the patient's telephone number and physical location. I discussed with the patient the nature of our telehealth visits, that:     Due to the nature of an audio- video modality, the only components of a physical exam that could be done are the elements supported by direct observation. I would evaluate the patient and recommend diagnostics and treatments based on my assessment. If it was felt that the patient should be evaluated in clinic or an emergency room setting, then they would be directed there. Our sessions are not being recorded and that personal health information is protected. Our team would provide follow up care in person if/when the patient needs it. Patient does agree to proceed with telemedicine consultation. Patient's location: home address in [facility]  Physician  location home address in [facility] other people involved in call, are none      Time spent: Greater than 25    This visit was completed virtually using Doxy. me  The patient is here for a medication list and treatment planning review  We will go over our care planning goals as well as take care of all refills  We will set up labs as well           Back Pain  This is a chronic problem. The current episode started more than 1 year ago. The problem occurs constantly.  The problem has been gradually worsening since onset. The pain is present in the lumbar spine. The quality of the pain is described as burning, stabbing and shooting. The pain is at a severity of 9/10. The pain is severe. The pain is the same all the time. Stiffness is present all day. Associated symptoms include leg pain, numbness (into her left leg) and tingling. Pertinent negatives include no abdominal pain, bladder incontinence, bowel incontinence, chest pain, dysuria, fever, headaches, paresis, paresthesias, pelvic pain, perianal numbness, weakness or weight loss. Knee Pain   The incident occurred more than 1 week ago. The pain is present in the left knee and right knee. The quality of the pain is described as shooting and stabbing. The pain is at a severity of 9/10. The pain is severe. The pain has been worsening since onset. Associated symptoms include a loss of motion, muscle weakness, numbness (into her left leg) and tingling. Pertinent negatives include no inability to bear weight or loss of sensation. Gastroesophageal Reflux  She reports no abdominal pain, no belching, no chest pain, no choking, no coughing, no dysphagia, no early satiety, no globus sensation, no heartburn, no hoarse voice, no nausea, no sore throat, no stridor, no tooth decay, no water brash or no wheezing. This is a recurrent problem. The current episode started more than 1 year ago. The problem occurs frequently. The problem has been gradually worsening. Associated symptoms include fatigue. Pertinent negatives include no anemia, melena, muscle weakness, orthopnea or weight loss. She has tried a PPI and a pro-motility drug for the symptoms. The treatment provided moderate relief. Past procedures include an EGD. Past procedures do not include an abdominal ultrasound, esophageal manometry, esophageal pH monitoring, H. pylori antibody titer or a UGI. Past invasive treatments do not include gastroplasty, gastroplication or reflux surgery.    Hypertension  Associated symptoms include anxiety, malaise/fatigue and neck pain. Pertinent negatives include no blurred vision, chest pain, headaches, orthopnea, palpitations, peripheral edema, PND, shortness of breath or sweats. Agents associated with hypertension include NSAIDs. Risk factors for coronary artery disease include dyslipidemia, family history, post-menopausal state and obesity. Past treatments include lifestyle changes. The current treatment provides significant improvement. Compliance problems include psychosocial issues, exercise and diet. There is no history of angina, kidney disease, CAD/MI, CVA, heart failure, left ventricular hypertrophy, PVD or retinopathy. Identifiable causes of hypertension include a thyroid problem. There is no history of chronic renal disease, coarctation of the aorta, hyperaldosteronism, hypercortisolism, hyperparathyroidism, a hypertension causing med, pheochromocytoma, renovascular disease or sleep apnea. ROS:  Review of Systems   Constitutional: Positive for fatigue and malaise/fatigue. Negative for activity change, appetite change, chills, diaphoresis, fever, unexpected weight change and weight loss. HENT: Negative for congestion, dental problem, drooling, ear discharge, ear pain, facial swelling, hearing loss, hoarse voice, mouth sores, nosebleeds, postnasal drip, rhinorrhea, sinus pressure, sinus pain, sneezing, sore throat, tinnitus, trouble swallowing and voice change. Eyes: Negative. Negative for blurred vision, photophobia, pain, discharge, redness, itching and visual disturbance. Respiratory: Negative for apnea, cough, choking, chest tightness, shortness of breath, wheezing and stridor. Cardiovascular: Negative. Negative for chest pain, palpitations, orthopnea, leg swelling and PND.    Gastrointestinal: Negative for abdominal distention, abdominal pain, anal bleeding, blood in stool, bowel incontinence, constipation, diarrhea, dysphagia, heartburn, melena, nausea, rectal pain and vomiting. Endocrine: Negative. Negative for cold intolerance, heat intolerance, polydipsia, polyphagia and polyuria. Genitourinary: Negative. Negative for bladder incontinence, decreased urine volume, difficulty urinating, dyspareunia, dysuria, enuresis, flank pain, frequency, genital sores, hematuria, menstrual problem, pelvic pain and urgency. Musculoskeletal: Positive for arthralgias, back pain, myalgias and neck pain. Negative for gait problem, joint swelling, muscle weakness and neck stiffness. Skin: Negative. Negative for color change, pallor, rash and wound. Allergic/Immunologic: Negative. Neurological: Positive for tingling and numbness (into her left leg). Negative for dizziness, tremors, seizures, syncope, facial asymmetry, speech difficulty, weakness, light-headedness, headaches and paresthesias. Hematological: Negative. Negative for adenopathy. Does not bruise/bleed easily. Psychiatric/Behavioral: Negative. Negative for agitation, behavioral problems, confusion, decreased concentration, dysphoric mood, hallucinations, self-injury, sleep disturbance and suicidal ideas. The patient is not nervous/anxious and is not hyperactive.          Past Medical/Surgical Hx;  Reviewed with patient      Diagnosis Date    Arthritis     knee    Asthma     Cancer ([facility] [address] 07/2007    ovarian    H/O cardiovascular stress test 06/13/2017    lexiscan    Hiatal hernia with gastroesophageal reflux     Hx of cardiovascular stress test 10/2012    lexiscan nuclear stress    Hyperlipidemia     Hypoactive thyroid     no meds    Osteoarthritis     PONV (postoperative nausea and vomiting)     Preoperative clearance 01/09/2017    Medical clearance from Dr. Nay Dale in EPIC notes    Ulcer of gastroesophageal junction      Past Surgical History:   Procedure Laterality Date    CHOLECYSTECTOMY      COLONOSCOPY      FRACTURE SURGERY Right 2005    humerus, hardware in place    HEMORRHOID SURGERY      HUMERUS CLOSED REDUCTION      HYSTERECTOMY  07    and bso    JOINT REPLACEMENT Left 2018    KNEE ARTHROPLASTY Right 2017    KNEE ARTHROSCOPY  right    LUMBAR LAMINECTOMY  2014    bilateral lumbar laminectomy L4 and L5 excision of a spinal tumor    NERVE BLOCK N/A 5 15 13    lumb ep #1    NERVE BLOCK N/A 13    lumbar epidural nerve block #2    NERVE BLOCK  2013    lumbar epidural #3    TOTAL KNEE ARTHROPLASTY Right     TUBAL LIGATION      UPPER GASTROINTESTINAL ENDOSCOPY         Past Family Hx:  Reviewed with patient      Problem Relation Age of Onset    Emphysema Mother     Migraines Mother     Cancer Mother     Arthritis Other     Cancer Other     Heart Disease Other        Social Hx:  Reviewed with patient  Social History     Tobacco Use    Smoking status: Former Smoker     Packs/day: 0.50     Years: 1.00     Pack years: 0.50     Types: Cigarettes     Start date: 1977     Quit date: 1978     Years since quittin.4    Smokeless tobacco: Never Used    Tobacco comment: quit many years ago   Substance Use Topics    Alcohol use: Yes     Alcohol/week: 2.0 standard drinks     Types: 2 Glasses of wine per week     Comment: social       OBJECTIVE  LMP  (LMP Unknown)     Problem List:  Danna Berman does not have any pertinent problems on file. PHYS EX:  General: Awake/Alert/Oriented/No Acute Distress    The 10-year ASCVD risk score (Zita Martínez, et al., 2013) is: 1.6%    Values used to calculate the score:      Age: 60 years      Sex: Female      Is Non- : No      Diabetic: No      Tobacco smoker: No      Systolic Blood Pressure: 961 mmHg      Is BP treated: No      HDL Cholesterol: 63 mg/dL      Total Cholesterol: 236 mg/dL      ASSESSMENT/PLAN  Grecia was seen today for results and back pain. Diagnoses and all orders for this visit:    DDD (degenerative disc disease), lumbar  -     diclofenac sodium (VOLTAREN) 1 % GEL;  Apply 2 g labs related to Grecia's current problems      Discussed importance of regular Health Maintenance follow up  Health Maintenance   Topic    Shingles Vaccine (1 of 2)    Colon cancer screen colonoscopy     DTaP/Tdap/Td vaccine (2 - Td)    Cervical cancer screen     Lipid screen     TSH testing     Breast cancer screen     Flu vaccine     Pneumococcal 0-64 years Vaccine     Hepatitis C screen     HIV screen     Hepatitis A vaccine     Hepatitis B vaccine     Hib vaccine     Meningococcal (ACWY) vaccine

## 2021-01-27 ENCOUNTER — EVALUATION (OUTPATIENT)
Dept: PHYSICAL THERAPY | Age: 56
End: 2021-01-27
Payer: COMMERCIAL

## 2021-01-27 DIAGNOSIS — M51.36 DDD (DEGENERATIVE DISC DISEASE), LUMBAR: Primary | ICD-10-CM

## 2021-01-27 PROCEDURE — 97163 PT EVAL HIGH COMPLEX 45 MIN: CPT | Performed by: PHYSICAL THERAPIST

## 2021-01-27 NOTE — PROGRESS NOTES
[facility] OUTPATIENT REHABILITATION  PHYSICAL THERAPY INITIAL EVALUATION         Date:  2021   Patient: Yenni Mcknight  : 1965  MRN: [id number]  Referring Provider: Harini Potter DO  [address],  [address]     Medical Diagnosis:      Diagnosis Orders   1. DDD (degenerative disc disease), lumbar         SUBJECTIVE:     Onset date: 2020    History / Mechanism of Injury: Patient reports her back \"went out\" in July. No known injury. Pain worsened and has affected gait and function. This pain level did calm down some, but remained present. Now her pain fluctuates and seems to be related to weightbearing and loading. 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 Patient states her back feels like it did when she had her spinal tumor. She adds Dr. Wu So wants an MRI with contrast to see if the tumor has returned. Chief complaint: back pain, Gideon leg pain (L>R) (gideon groin pain, L leg pain down posterior leg to L calf), limited gait ability, swelling L foot, limited ADLs, limited IADLs, limited ability to bend and lift    Pain: constant  Current: 7/10     Best: 7/10     Worst:10/10 (occurs with standing, walking).       Symptom Type/Quality: aching, sharp, throbbing  Location[de-identified] Back: noted above           Provoking Activities/Positions: standing, walking, lifting/carrying/material handling                 Relieving Activitie/Positions: sitting    Disturbed Sleep: yes  Bladder Dysfunction: yes  Bowel Dysfunction: yes   Bowel bladder control issues for about 1-2 months     Imaging results: Xr Hip Bilateral W Ap Pelvis (2 Views)    Result Date: 2021 EXAMINATION: ONE XRAY VIEW OF THE PELVIS AND TWO XRAY VIEWS OF EACH OF THE BILATERAL HIPS 1/5/2021 11:42 am COMPARISON: None. HISTORY: ORDERING SYSTEM PROVIDED HISTORY: Hip pain FINDINGS: No fracture or dislocation. Minimal osteoarthritis at the left hip. Normal soft tissues. The bones are normally mineralized. Minimal osteoarthritis at the left hip.     Mri Lumbar Spine Wo Contrast    Result Date: 1/5/2021 EXAMINATION: MRI OF THE LUMBAR SPINE WITHOUT CONTRAST, 1/5/2021 11:16 am TECHNIQUE: Multiplanar multisequence MRI of the lumbar spine was performed without the administration of intravenous contrast. COMPARISON: CT lumbar spine performed 12/12/2013. HISTORY: ORDERING SYSTEM PROVIDED HISTORY: Sciatic neuropathy, left FINDINGS: BONES/ALIGNMENT: The vertebral body heights are maintained. There is age-appropriate bone marrow signal.  There is mild multilevel degenerative disc disease with loss of disc signal.  There is no disc space narrowing. There is no spondylolisthesis. SPINAL CORD: The conus medullaris is normal in caliber and signal and terminates at the L1 level. The cauda equina is unremarkable. SOFT TISSUES: Posterior paraspinal soft tissues are unremarkable. The visualized abdominal structures are unremarkable. L1-L2: There is no significant disc herniation, spinal canal stenosis or neural foraminal narrowing. L2-L3: There is a mild circumferential disc bulge. There is no canal stenosis or foraminal narrowing. L3-L4: There is a circumferential disc bulge with facet and ligamentous hypertrophy. There is no canal stenosis. There is mild bilateral foraminal narrowing. L4-L5: There is a circumferential disc bulge with facet and ligamentous hypertrophy. There is posterior laminectomy. There is no canal stenosis. There is moderate to severe bilateral foraminal narrowing. There is narrowing of the lateral recesses that is worse on the right compared to the left. L5-S1: There is a circumferential disc bulge with facet hypertrophy. There is posterior laminectomy. There is no canal stenosis. There is mild bilateral foraminal narrowing. Multilevel degenerative change with mild bilateral foraminal narrowing at L3-4, moderate to severe bilateral foraminal narrowing at L4-5, and mild bilateral foraminal narrowing at L5-S1.     Won Digital Screen W Or Wo Cad Bilateral    Result Date: 1/5/2021 EXAMINATION: SCREENING DIGITAL BILATERAL  MAMMOGRAM, 6/30/2020 TECHNIQUE: Screening mammography of the bilateral breasts was performed  in the MLO and CC projection. Computer aided detection was utilized in the interpretation of this exam. COMPARISON: 06/06/2018 HISTORY: Screening. FINDINGS: There are scattered areas of fibroglandular density. There is no new dominant mass, suspicious microcalcification, or area of architectural distortion. No mammographic evidence of malignancy. BIRADS: BIRADS - CATEGORY 1 Negative, no evidence of malignancy. Normal interval follow-up is recommended in 12 months. OVERALL ASSESSMENT - NEGATIVE A letter of notification will be sent to the patient regarding the results. The [facility] of Radiology recommend annual mammograms for women 40 years and older.       Past Medical History:  Past Medical History:   Diagnosis Date    Arthritis     knee    Asthma     Cancer ([facility] [address] 07/2007    ovarian    H/O cardiovascular stress test 06/13/2017    lexiscan    Hiatal hernia with gastroesophageal reflux     Hx of cardiovascular stress test 10/2012    lexiscan nuclear stress    Hyperlipidemia     Hypoactive thyroid     no meds    Osteoarthritis     PONV (postoperative nausea and vomiting)     Preoperative clearance 01/09/2017    Medical clearance from Dr. Dennis Whitehead in EPIC notes    Ulcer of gastroesophageal junction      Past Surgical History:   Procedure Laterality Date    CHOLECYSTECTOMY      COLONOSCOPY      FRACTURE SURGERY Right 2005    humerus, hardware in place   [address]  7/18/07    and bso    JOINT REPLACEMENT Left 11/2018    KNEE ARTHROPLASTY Right 02/02/2017    KNEE ARTHROSCOPY  right    LUMBAR LAMINECTOMY  05/23/2014    bilateral lumbar laminectomy L4 and L5 excision of a spinal tumor    NERVE BLOCK N/A 5 15 13    lumb ep #1    NERVE BLOCK N/A 5/29/13 lumbar epidural nerve block #2    NERVE BLOCK  06 12 2013    lumbar epidural #3    TOTAL KNEE ARTHROPLASTY Right     TUBAL LIGATION      UPPER GASTROINTESTINAL ENDOSCOPY         Medications:   Current Outpatient Medications   Medication Sig Dispense Refill    traMADol (ULTRAM) 50 MG tablet Take 1 tablet by mouth every 6 hours as needed for Pain for up to 30 days. Intended supply: 30 days. Take lowest dose possible to manage pain 120 tablet 0    diclofenac sodium (VOLTAREN) 1 % GEL Apply 2 g topically 4 times daily as needed for Pain 100 g 3    ibuprofen (ADVIL;MOTRIN) 600 MG tablet Take 1 tablet by mouth 3 times daily as needed for Pain 90 tablet 5    acetaminophen (AMINOFEN) 325 MG tablet Take 2 tablets by mouth every 4 hours as needed for Pain 120 tablet 3    DULoxetine (CYMBALTA) 30 MG extended release capsule Take 1 capsule by mouth daily 90 capsule 1    metoclopramide (REGLAN) 10 MG tablet TAKE 1 TABLET BY MOUTH EVERY DAY 90 tablet 1    pantoprazole (PROTONIX) 40 MG tablet TAKE 1 TABLET BY MOUTH EVERY DAY 90 tablet 1    rosuvastatin (CRESTOR) 5 MG tablet TAKE 1 TABLET BY MOUTH EVERY DAY 90 tablet 1    montelukast (SINGULAIR) 10 MG tablet TAKE 1 TABLET BY MOUTH EVERY DAY 90 tablet 1    Menthol, Topical Analgesic, (BIOFREEZE EX) Apply 1 each topically 2 times daily as needed (Joint Pain)      aspirin 81 MG tablet Take 81 mg by mouth 2 times daily      docusate sodium (DULCOLAX) 100 MG capsule Take 1 capsule by mouth 3 times daily as needed. 270 capsule 1     No current facility-administered medications for this visit. Occupation: [employer]. Physical demands include: lifting, carrying, pushing, pulling medium weighted items, walking, standing. Status: full time    Patient Goals: pain relief    Contraindications/Precautions: none    OBJECTIVE:     Observations: well nourished female, normal affect     Inspection: normal orthopedic exam .  Edema noted L foot compared to R foot. Gait: antalgic    Functional Strength:   Able to toe walk and heel walk. Squat is limited. Range of Motion:    Trunk:    Flexion:  [] Normal   [x] Limited    Extension:  [] Normal   [x] Limited     Right Rotation: [] Normal   [x] Limited    Left Rotation:  [] Normal   [x] Limited    Right Side Bending: [] Normal   [x] Limited    Left Side Bending: [] Normal   [x] Limited     ROM limited 50% in all directions due to pain. Compression more painful than tensile forces. Lower Extremity:   Right:   [x] Normal   [] Limited    Left:   [x] Normal   [] Limited   Gideon hip ROM is full and painless with exception of L hip flexion which was painful over greater trochanter       Strength:     Trunk: 3/5   R LE: 5/5   L LE: 4/5    Palpation: Tender to palpation L greater trochanter, Non-tender to palpation lumbar soft tissues      Sensation: intact to light touch and temperature. Special Tests:   [x] Nerve Root Compression           Right []+ / [x] -    Left [x]+ / [] -  [x] Slump           Right []+ / [x] -    Left []+ / [x] -  [x] FADIR          Right []+ / [x] -    Left []+ / [x] -  [] S-I Distraction          Right []+ / [] -    Left []+ / [] -     [x] SLR           Right []+ / [x] -    Left []+ / [x] -     [x] HUGO          Right []+ / [x] -    Left []+ / [x] -  [] S-I Compression          Right []+ / [] -    Left []+ / [] -   [] Other: []+ / [] -         ASSESSMENT     Outcome Measure:   Oswestry Low Back Disability Questionnaire 56% disability    Problems:   ? New onset bowel and bladder issues. Leo Herzog and Tariq informed. Patient to also contact these providers to inform them. ? Pain reported 7-9/10  ? ROM decreased (50%)  ? Strength decreased (3/5 trunk, 4/5 L LE)  ? Edema L foot  ?  Decreased functional ability with walking, standing, work, ADLs, use of left lower extremity, bending, reaching, lifting, carrying    [x] There are no barriers affecting plan of care or recovery [] Barriers to this patient's plan of care or recovery include. Domestic Concerns:  [x] No  [] Yes:    Short Term goals (2-3 weeks)  ? Decrease reported pain to 5/10  ? Increase ROM to 25%  ? Increase Strength to 4/5   ? Able to perform/complete the following functions/tasks: ADLs  ? Oswestry Low Back Disability Questionnaire 50% disability    Long Term goals (4-6 weeks)  ? Decrease reported pain to 3/10  ? Increase ROM to full  ? Increase Strength to 5/5   ? Able to perform/complete the following functions/tasks: IADLs, tolerate standing tasks at work, lift/carry to perform household tasks    ? Oswestry Low Back Disability Questionnaire 30% disability   ? Independent with Home Exercise Programs    Rehab Potential: [x] Good  [] Fair  [] Poor    PLAN       Treatment Plan:   [x] Therapeutic Exercise  [x] Therapeutic Activity  [x] Neuromuscular Re-education   [] Gait Training  [] Balance Training  [] Aerobic conditioning  [] Manual Therapy  [] Massage/Fascial release   [] Work/Sport specific activities    [] Pain Neuroscience [x] Cold/hotpack  [] Vasocompression  [x] Electrical Stimulation  [] Lumbar/Cervical Traction  [] Ultrasound   [] Iontophoresis: 4 mg/mL Dexamethasone Sodium Phosphate 40-80 mAmin        [x] Instruction in HEP      []  Medication allergies reviewed for use of Dexamethasone Sodium Phosphate 4mg/ml  with iontophoresis treatments. Patient is not allergic. The following CPT codes are likely to be used in the care of this patient: 42337 PT Evaluation: High Complexity , 88617 PT Re-Evaluation , 82179 Therapeutic Exercise , 21231 Neuromuscular Re-Education , 32016 Therapeutic Activities ,  Electrical Stimulation      Suggested Professional Referral: [x] No  [] Yes:     Patient Education:  [x] Plans/Goals, Risks/Benefits discussed  [x] Home exercise program  Method of Education: [x] Verbal  [x] Demo  [x] Written  Comprehension of Education:  [x] Verbalizes understanding. [x] Demonstrates understanding. [] Needs Review. [] Demonstrates/verbalizes understanding of HEP/Ed previously given. Frequency:  2 days per week for 4-6 weeks    Patient understands diagnosis/prognosis and consents to treatment, plan and goals: [x] Yes    [] No     Thank you for the opportunity to work with your patient. If you have questions or comments, please contact me at [phone]; fax: [phone]. Electronically signed by: Michael Morgan PT         Please sign Physician's Certification and return to: [address] [place] [address]  Dept: [phone]  Dept Fax: [phone] Certification / Comments     Frequency/Duration 2 days per week for 4-6 weeks. Certification period from 1/27/2021  to 4/27/2021. I have reviewed the Plan of Care established for skilled therapy services and certify that the services are required and that they will be provided while the patient is under my care.     Physician's Comments/Revisions:               Physician's Printed Name:                                           [de-identified] Signature:                                                               Date:

## 2021-01-27 NOTE — PROGRESS NOTES
Physical Therapy Treatment Note    Date: 2021  Patient Name: Dominique Oconnell  : 1965   MRN: [id number]  DOInjury: 2020  DOSx: --  Referring Provider: Jack Potter DO  [address],  [address]     Medical Diagnosis:      Diagnosis Orders   1. DDD (degenerative disc disease), lumbar     Also L greater trochanter pain     Outcome Measure:   Oswestry Low Back Disability Questionnaire 56% disability    S: See eval  O:  Time 0803-3318     Visit  Repeat outcome measure at mid point and end. Pain Pain 7/10     ROM Dec 50%     Modalities      MH + ES Next   MO   Stretch      PPT Next   NR   SKTC   TE   DKTC   TE   Seated flexion Discussed for HEP. Do next to reinforce. TE      TE   Exercise      Bridging Next   TE   S-lying hip ABD (L leg) Next   TE   Clamshell s-lying (using L leg)  Next   TE         ROWS: H   TA   ROWS: M   TA   ROWS: L   TA   Obliques - high   TA   Lawnmower Pulls   TA   Standing rectus pull down   TA   Marching   TA   Squats   TA      TA      TA      TA               A:  Tolerated well. Above added to written HEP. Discussed anatomy, physiology, body mechanics, principles of loading, and progressive loading/activity. P: Continue with rehab plan.      Holiday, PT    Treatment Charges: Mins Units   Initial Evaluation 45 1   Re-Evaluation     Ther Exercise         TE     Manual Therapy     MT     Ther Activities        TA     Gait Training          GT     Neuro Re-education NR     Modalities     Non-Billable Service Time     Other     Total Time/Units 45 1

## 2021-02-03 ENCOUNTER — TREATMENT (OUTPATIENT)
Dept: PHYSICAL THERAPY | Age: 56
End: 2021-02-03
Payer: COMMERCIAL

## 2021-02-03 DIAGNOSIS — M51.36 DDD (DEGENERATIVE DISC DISEASE), LUMBAR: Primary | ICD-10-CM

## 2021-02-03 PROCEDURE — 97110 THERAPEUTIC EXERCISES: CPT

## 2021-02-03 PROCEDURE — G0283 ELEC STIM OTHER THAN WOUND: HCPCS

## 2021-02-03 NOTE — PROGRESS NOTES
Physical Therapy Treatment Note    Date: 2/3/2021  Patient Name: Lynda Almendarez  : 1965   MRN: [id number]  DOInjury: 2020  DOSx: --  Referring Provider:   Meenu Potter, DO  [address],  [address]        Medical Diagnosis:    Diagnosis Orders   1. DDD (degenerative disc disease), lumbar     Also L greater trochanter pain     Outcome Measure:  Oswestry Low Back Disability Questionnaire 56% disability    S: Sidelying relieves pain and any activities aggravate back. Patient stated her right side is also now starting to aggravate her. O:  Time 8:45-9:41     Visit  Repeat outcome measure at mid point and end. Pain Pain 7/10     ROM Dec 50%     Modalities      MH + ES 20 min  MO   Stretch      PPT 10x  NR   SKTC   TE   DKTC   TE   Seated flexion Discussed for HEP. 10x  TE      TE   Exercise      Bridging 11x   TE   S-lying hip ABD  15x each  TE   Clamshell s-lying (using L leg)  15x each  TE         ROWS: H   TA   ROWS: M   TA   ROWS: L   TA   Obliques - high   TA   Lawnmower Pulls   TA   Standing rectus pull down   TA   Marching   TA   Squats   TA      TA      TA      TA               A: Tolerated well. Above added to written HEP. Discussed anatomy, physiology, body mechanics, principles of loading, and progressive loading/activity. P: Continue with rehab plan.     Subha Noel PTA    Treatment Charges: Mins Units   Initial Evaluation     Re-Evaluation     Ther Exercise         TE 36 2   Manual Therapy     MT     Ther Activities        TA     Gait Training          GT     Neuro Re-education NR     Modalities 20 1   Non-Billable Service Time     Other     Total Time/Units 56 3

## 2021-02-05 ENCOUNTER — TREATMENT (OUTPATIENT)
Dept: PHYSICAL THERAPY | Age: 56
End: 2021-02-05
Payer: COMMERCIAL

## 2021-02-05 DIAGNOSIS — M51.36 DDD (DEGENERATIVE DISC DISEASE), LUMBAR: Primary | ICD-10-CM

## 2021-02-05 PROCEDURE — 97110 THERAPEUTIC EXERCISES: CPT

## 2021-02-05 PROCEDURE — G0283 ELEC STIM OTHER THAN WOUND: HCPCS

## 2021-02-05 NOTE — PROGRESS NOTES
Physical Therapy Treatment Note    Date: 2021  Patient Name: Avani Fonseca  : 1965   MRN: [id number]  DOInjury: 2020  DOSx: --  Referring Provider:   STEPHANIE Potter DO  [address],  [address]        Medical Diagnosis:    Diagnosis Orders   1. DDD (degenerative disc disease), lumbar     Also L greater trochanter pain     Outcome Measure:  Oswestry Low Back Disability Questionnaire 56% disability    S: Patient reports increased soreness in low back since last session. O:  Time 8:45-9:30     Visit 212 Repeat outcome measure at mid point and end. Pain Pain 8/10     ROM Dec 50%     Modalities      MH + ES 20 min  MO   Stretch      PPT 10x  NR   SKTC 3 x 30 sec hold each  TE   DKTC   TE   Seated flexion Discussed for HEP  TE      TE   Exercise      Bridging 10x   TE   S-lying hip ABD  15x each  TE   Clamshell s-lying (using L leg)  15x each  TE         ROWS: H   TA   ROWS: M   TA   ROWS: L   TA   Obliques - high   TA   Lawnmower Pulls   TA   Standing rectus pull down   TA   Marching   TA   Squats   TA      TA      TA      TA               A: Tolerated well. Above added to written HEP. Discussed anatomy, physiology, body mechanics, principles of loading, and progressive loading/activity. P: Continue with rehab plan.     Jocelin Love PTA    Treatment Charges: Mins Units   Initial Evaluation     Re-Evaluation     Ther Exercise         TE 30 2   Manual Therapy     MT     Ther Activities        TA     Gait Training          GT     Neuro Re-education NR     Modalities 15 1   Non-Billable Service Time     Other     Total Time/Units 45 3

## 2021-02-08 ENCOUNTER — TREATMENT (OUTPATIENT)
Dept: PHYSICAL THERAPY | Age: 56
End: 2021-02-08
Payer: COMMERCIAL

## 2021-02-08 DIAGNOSIS — M51.36 DDD (DEGENERATIVE DISC DISEASE), LUMBAR: Primary | ICD-10-CM

## 2021-02-08 PROCEDURE — G0283 ELEC STIM OTHER THAN WOUND: HCPCS

## 2021-02-08 PROCEDURE — 97110 THERAPEUTIC EXERCISES: CPT

## 2021-02-08 NOTE — PROGRESS NOTES
Physical Therapy Treatment Note    Date: 2021  Patient Name: Lisa Sevilla  : 1965   MRN: [id number]  DOInjury: 2020  DOSx: --    Referring Provider:   Mumtaz Potter DO  [address], [address]        Medical Diagnosis:    Diagnosis Orders   1. DDD (degenerative disc disease), lumbar     Also L greater trochanter pain     Outcome Measure:  Oswestry Low Back Disability Questionnaire 56% disability    S: Patient reports increased soreness and pain in tailbone area due to just got off work. O:  Time 15:15-16:00     Visit  Repeat outcome measure at mid point and end. Pain Pain 8/10     ROM Dec 50%     Modalities      MH + ES 20 min  MO   Stretch      PPT 10x  NR   SKTC 3 x 30 sec hold each  TE   DKTC   TE   Seated flexion Discussed for HEP  TE      TE   Exercise      Bridging 10x   TE   S-lying hip ABD  15x each  TE   Clamshell s-lying (using L leg)  15x each  TE         ROWS: H   TA   ROWS: M   TA   ROWS: L   TA   Obliques - high   TA   Lawnmower Pulls   TA   Standing rectus pull down   TA   Marching   TA   Squats   TA      TA      TA      TA               A: Tolerated well. Above added to written HEP. Discussed anatomy, physiology, body mechanics, principles of loading, and progressive loading/activity. P: Continue with rehab plan.     Kasie Osborne PTA    Treatment Charges: Mins Units   Initial Evaluation     Re-Evaluation     Ther Exercise         TE 25 2   Manual Therapy     MT     Ther Activities        TA     Gait Training          GT     Neuro Re-education NR     Modalities 20 1   Non-Billable Service Time     Other     Total Time/Units 45 3

## 2021-02-10 ENCOUNTER — TREATMENT (OUTPATIENT)
Dept: PHYSICAL THERAPY | Age: 56
End: 2021-02-10
Payer: COMMERCIAL

## 2021-02-10 ENCOUNTER — HOSPITAL ENCOUNTER (OUTPATIENT)
Dept: MRI IMAGING | Age: 56
Discharge: HOME | End: 2021-02-12
Payer: COMMERCIAL

## 2021-02-10 DIAGNOSIS — M51.36 DDD (DEGENERATIVE DISC DISEASE), LUMBAR: Primary | ICD-10-CM

## 2021-02-10 DIAGNOSIS — M54.42 CHRONIC BILATERAL LOW BACK PAIN WITH BILATERAL SCIATICA: ICD-10-CM

## 2021-02-10 DIAGNOSIS — Z98.890 S/P LAMINECTOMY: ICD-10-CM

## 2021-02-10 DIAGNOSIS — G89.29 CHRONIC BILATERAL LOW BACK PAIN WITH BILATERAL SCIATICA: ICD-10-CM

## 2021-02-10 DIAGNOSIS — M54.41 CHRONIC BILATERAL LOW BACK PAIN WITH BILATERAL SCIATICA: ICD-10-CM

## 2021-02-10 PROCEDURE — 6360000004 HC RX CONTRAST MEDICATION: Performed by: RADIOLOGY

## 2021-02-10 PROCEDURE — A9577 INJ MULTIHANCE: HCPCS | Performed by: RADIOLOGY

## 2021-02-10 PROCEDURE — G0283 ELEC STIM OTHER THAN WOUND: HCPCS

## 2021-02-10 PROCEDURE — 97110 THERAPEUTIC EXERCISES: CPT

## 2021-02-10 PROCEDURE — 72158 MRI LUMBAR SPINE W/O & W/DYE: CPT

## 2021-02-10 RX ADMIN — GADOBENATE DIMEGLUMINE 20 ML: 529 INJECTION, SOLUTION INTRAVENOUS at 17:40

## 2021-02-10 NOTE — PROGRESS NOTES
Physical Therapy Treatment Note    Date: 2/10/2021  Patient Name: Lex Castrejon  : 1965   MRN: [id number]  DOInjury: 2020  DOSx: --    Referring Provider:   Chago Potter,   [address], [address]        Medical Diagnosis:    Diagnosis Orders   1. DDD (degenerative disc disease), lumbar     Also L greater trochanter pain     Outcome Measure:  Oswestry Low Back Disability Questionnaire 56% disability    S: Patient reports increased pain in left buttock where she had difficulty working a long shift last night. Patient stands in place at a \"mask station\" and had to lean over station for relief. Patient to be getting MRI later today. O:  Time 15:15-16:15     Visit  Repeat outcome measure at mid point and end. Pain Pain 8/10     ROM Dec 50%     Modalities      MH + ES 20 min  MO   Stretch      PPT 15x  NR   SKTC   TE   Piriformis stretch 3 x 30 sec hold New - assisted TE   Seated flexion Discussed for HEP  TE      TE   Exercise      Bridging 15x   TE   S-lying hip ABD  15x each  TE   Clamshell s-lying (using L leg)  15x each  TE         ROWS: H   TA   ROWS: M   TA   ROWS: L   TA   Obliques - high   TA   Lawnmower Pulls   TA   Standing rectus pull down   TA   Marching   TA   Squats   TA      TA      TA      TA               A: Tolerated fair with rest breaks needed between exercises. Patient had pain relief with stretching and modalities. Above added to written HEP. Discussed anatomy, physiology, body mechanics, principles of loading, and progressive loading/activity. P: Continue with rehab plan.     Mary Fall, PTA    Treatment Charges: Mins Units   Initial Evaluation     Re-Evaluation     Ther Exercise         TE 40 3   Manual Therapy     MT     Ther Activities        TA     Gait Training          GT     Neuro Re-education NR     Modalities 20 1   Non-Billable Service Time     Other     Total Time/Units 60 4

## 2021-02-13 DIAGNOSIS — M51.36 DDD (DEGENERATIVE DISC DISEASE), LUMBAR: ICD-10-CM

## 2021-02-15 RX ORDER — ACETAMINOPHEN 325 MG/1
650 TABLET ORAL EVERY 4 HOURS PRN
Qty: 120 TABLET | Refills: 3 | Status: SHIPPED
Start: 2021-02-15 | End: 2021-07-26 | Stop reason: SDUPTHER

## 2021-02-17 ENCOUNTER — TREATMENT (OUTPATIENT)
Dept: PHYSICAL THERAPY | Age: 56
End: 2021-02-17
Payer: COMMERCIAL

## 2021-02-17 DIAGNOSIS — M51.36 DDD (DEGENERATIVE DISC DISEASE), LUMBAR: Primary | ICD-10-CM

## 2021-02-17 PROCEDURE — 97110 THERAPEUTIC EXERCISES: CPT

## 2021-02-17 PROCEDURE — G0283 ELEC STIM OTHER THAN WOUND: HCPCS

## 2021-02-17 PROCEDURE — 97140 MANUAL THERAPY 1/> REGIONS: CPT

## 2021-02-17 NOTE — PROGRESS NOTES
Physical Therapy Treatment Note    Date: 2021  Patient Name: Felicitas Chung  : 1965   MRN: [id number]  DOInjury: 2020  DOSx: --    Referring Provider:   Deshaun Potter DO  [address], [address]        Medical Diagnosis:    Diagnosis Orders   1. DDD (degenerative disc disease), lumbar     Also L greater trochanter pain     Outcome Measure:  Oswestry Low Back Disability Questionnaire 56% disability    S: Patient had MRI done last week and seen doctor this morning. Doctor had told patient to lose weight and to continue exercising. Surgery may be an option as well and patient will be looking into [facility] for that. Patient continues to have increased pain at work. Patient stands in place at a \"mask station\" and has to lean over station at times for relief. O:  Time 15:15-16:15     Visit 5/12 Repeat outcome measure at mid point and end. Pain Pain 5/10     ROM Dec 50%     Low back/Gluteal soft tissue massage 10 min  MT         Modalities      MH + ES 15 min  MO   Stretch      PPT 15x  NR   SKTC   TE   Piriformis stretch 3 x 30 sec hold Assisted MT   Seated flexion Discussed for HEP  TE      TE   Exercise      Bridging 20x   TE   S-lying hip ABD  20x each  TE   Clamshell s-lying (using L leg)  20x each  TE         ROWS: H   TA   ROWS: M   TA   ROWS: L   TA   Obliques - high   TA   Lawnmower Pulls   TA   Standing rectus pull down   TA   Marching   TA   Squats   TA      TA      TA      TA               A: Tolerated treatment changes well. Patient had pain relief with stretching, massage and modalities. Added soft tissue massage to improve tissue extensibility; induce relaxation; mobilize or manipulate soft tissue and joints; modulate pain; and reduce soft tissue swelling, inflammation, or restriction. Discussed anatomy, physiology, body mechanics, principles of loading, and progressive loading/activity. P: Continue with rehab plan. Christina Rebollar PTA    Treatment Charges: Mins Units   Initial Evaluation     Re-Evaluation     Ther Exercise         TE 30 2   Manual Therapy     MT 15 1   Ther Activities        TA     Gait Training          GT     Neuro Re-education NR     Modalities 15 1   Non-Billable Service Time     Other     Total Time/Units 60 4

## 2021-02-19 ENCOUNTER — TREATMENT (OUTPATIENT)
Dept: PHYSICAL THERAPY | Age: 56
End: 2021-02-19
Payer: COMMERCIAL

## 2021-02-19 DIAGNOSIS — M51.36 DDD (DEGENERATIVE DISC DISEASE), LUMBAR: Primary | ICD-10-CM

## 2021-02-19 PROCEDURE — G0283 ELEC STIM OTHER THAN WOUND: HCPCS

## 2021-02-19 PROCEDURE — 97110 THERAPEUTIC EXERCISES: CPT

## 2021-02-19 NOTE — PROGRESS NOTES
Physical Therapy Treatment Note    Date: 2021  Patient Name: Tray Tan  : 1965   MRN: [id number]  DOInjury: 2020  DOSx: --    Referring Provider:   Bashir Potter DO  [address], [address]        Medical Diagnosis:    Diagnosis Orders   1. DDD (degenerative disc disease), lumbar     Also L greater trochanter pain     Outcome Measure:  Oswestry Low Back Disability Questionnaire 56% disability    S: Patient had increased pain and swelling after last treatment session. Patient also continues to have increased pain at work. Patient stands in place at a \"mask station\" and has to lean over station at times for relief. Following through with HEP.  O:  Time 13:00-14:00     Visit  Repeat outcome measure at mid point and end. Pain Pain 5/10     ROM Dec 50%     Low back/Gluteal soft tissue massage   MT         Modalities      MH + ES 15 min Prone  MO   Stretch      PPT 20x  NR   SKTC   TE   Piriformis stretch 3 x 30 sec hold Assisted MT   Seated flexion Discussed for HEP  TE      TE   Exercise      Bridging 20x   TE   S-lying hip ABD  20x each  TE   Clamshell s-lying (using L leg)  20x each  TE         ROWS: H   TA   ROWS: M   TA   ROWS: L   TA   Obliques - high   TA   Lawnmower Pulls   TA   Standing rectus pull down   TA   Marching   TA   Squats   TA      TA      TA      TA               A: Tolerated treatment changes well. Patient had pain relief with stretching and modalities. Deferred soft tissue massage due to increased soreness and swelling after performed last session. Discussed anatomy, physiology, body mechanics, principles of loading, and progressive loading/activity. P: Continue with rehab plan.     Giovani Vitale PTA    Treatment Charges: Mins Units   Initial Evaluation     Re-Evaluation     Ther Exercise         TE 35 2   Manual Therapy     MT 5 0   Ther Activities        TA     Gait Training          GT     Neuro Re-education NR Modalities 20 1   Non-Billable Service Time     Other     Total Time/Units 60 4

## 2021-02-22 ENCOUNTER — TREATMENT (OUTPATIENT)
Dept: PHYSICAL THERAPY | Age: 56
End: 2021-02-22
Payer: COMMERCIAL

## 2021-02-22 DIAGNOSIS — M51.36 DDD (DEGENERATIVE DISC DISEASE), LUMBAR: Primary | ICD-10-CM

## 2021-02-22 PROCEDURE — G0283 ELEC STIM OTHER THAN WOUND: HCPCS

## 2021-02-22 PROCEDURE — 97110 THERAPEUTIC EXERCISES: CPT

## 2021-02-26 ENCOUNTER — TREATMENT (OUTPATIENT)
Dept: PHYSICAL THERAPY | Age: 56
End: 2021-02-26
Payer: COMMERCIAL

## 2021-02-26 DIAGNOSIS — M51.36 DDD (DEGENERATIVE DISC DISEASE), LUMBAR: Primary | ICD-10-CM

## 2021-02-26 PROCEDURE — 97110 THERAPEUTIC EXERCISES: CPT

## 2021-02-26 PROCEDURE — G0283 ELEC STIM OTHER THAN WOUND: HCPCS

## 2021-02-26 NOTE — PROGRESS NOTES
Physical Therapy Treatment Note    Date: 2021  Patient Name: Kat Cedeño  : 1965   MRN: [id number]  DOInjury: 2020  DOSx: --    Referring Provider:   Darlene Potter DO  [address], [address]        Medical Diagnosis:    Diagnosis Orders   1. DDD (degenerative disc disease), lumbar     Also L greater trochanter pain     Outcome Measure:  Oswestry Low Back Disability Questionnaire 56% disability    S: Patient going to see orthopedic spine surgeon, Dr. Lilly Gerber, at [facility] March 3. Patient continues to have a lot of sciatic pain that radiates down leg, left worse than right. O:  Time 1025-11:25     Visit  Repeat outcome measure at mid point and end. Pain Pain 5/10     ROM Dec 50%     Low back/Gluteal soft tissue massage   MT         Modalities      MH + ES 15 min Prone  MO   Stretch      PPT 20x  NR   SKTC   TE   Piriformis stretch 3 x 30 sec hold  TE   Seated flexion Discussed for HEP  TE      TE   Exercise      Bridging 20x   TE   S-lying hip ABD  Red band 2 x 10  TE   Clamshell s-lying (using L leg)  Red band 2 x 10  TE         ROWS: H   TA   ROWS: M   TA   ROWS: L   TA   Obliques - high   TA   Lawnmower Pulls   TA   Standing rectus pull down   TA   Marching   TA   Squats   TA      TA      TA      TA               A: Patient tolerated treatment well. Instructed a new piriformis stretch that patient didn't require assist with. Encouraged to perform at home and work if able. Patient felt relief with piriformis stretch and modalities. Discussed anatomy, physiology, body mechanics, principles of loading, and progressive loading/activity. P: Continue with rehab plan.     Andrew Lin PTA    Treatment Charges: Mins Units   Initial Evaluation     Re-Evaluation     Ther Exercise         TE 40 3   Manual Therapy     MT     Ther Activities        TA     Gait Training          GT     Neuro Re-education NR     Modalities 20 1 Non-Billable Service Time     Other     Total Time/Units 60 4

## 2021-03-01 ENCOUNTER — TREATMENT (OUTPATIENT)
Dept: PHYSICAL THERAPY | Age: 56
End: 2021-03-01
Payer: COMMERCIAL

## 2021-03-01 DIAGNOSIS — M51.36 DDD (DEGENERATIVE DISC DISEASE), LUMBAR: Primary | ICD-10-CM

## 2021-03-01 PROCEDURE — G0283 ELEC STIM OTHER THAN WOUND: HCPCS

## 2021-03-01 PROCEDURE — 97124 MASSAGE THERAPY: CPT

## 2021-03-01 NOTE — PROGRESS NOTES
Physical Therapy Treatment Note    Date: 3/1/2021  Patient Name: Lisa Sevilla  : 1965   MRN: [id number]  DOInjury: 2020  DOSx: --    Referring Provider:   Mumtaz Potter DO  [address], [address]        Medical Diagnosis:    Diagnosis Orders   1. DDD (degenerative disc disease), lumbar     Also L greater trochanter pain     Outcome Measure:  Oswestry Low Back Disability Questionnaire 56% disability    S: Patient going to see orthopedic spine surgeon, Dr. Moody Eli, at [facility] March 3. Patient continues to have a lot of sciatic pain that radiates down leg, left worse than right. Patient requested soft tissue massage due to increased tightness, pain and swelling. O:  Time 15:15-16:00     Visit  Repeat outcome measure at mid point and end. Pain Pain 5/10     ROM Dec 50%     Low back/Gluteal soft tissue massage 15 min  MT         Modalities      MH + ES 20 min Prone  MO   Stretch      PPT 20x  NR   SKTC   TE   Piriformis stretch 3 x 30 sec hold  TE   Seated flexion Discussed for HEP  TE      TE   Exercise      Bridging 20x   TE   S-lying hip ABD  Red band 2 x 10  TE   Clamshell s-lying (using L leg)  Red band 2 x 10  TE         ROWS: H   TA   ROWS: M   TA   ROWS: L   TA   Obliques - high   TA   Lawnmower Pulls   TA   Standing rectus pull down   TA   Marching   TA   Squats   TA      TA      TA      TA               A: Patient tolerated treatment well. Added back in soft tissue massage to improve tissue extensibility; induce relaxation; mobilize or manipulate soft tissue and joints; modulate pain; and reduce soft tissue swelling, inflammation, or restriction. Patient felt relief with massage and modalities. Discussed anatomy, physiology, body mechanics, principles of loading, and progressive loading/activity. P: Continue with rehab plan.     Kasie Osborne PTA    Treatment Charges: Mins Units   Initial Evaluation     Re-Evaluation Ther Exercise         TE     Manual Therapy     MT 15 1   Ther Activities        TA     Gait Training          GT     Neuro Re-education NR     Modalities 20 1   Non-Billable Service Time     Other     Total Time/Units 35 2

## 2021-03-03 ENCOUNTER — TREATMENT (OUTPATIENT)
Dept: PHYSICAL THERAPY | Age: 56
End: 2021-03-03
Payer: COMMERCIAL

## 2021-03-03 DIAGNOSIS — M51.36 DDD (DEGENERATIVE DISC DISEASE), LUMBAR: Primary | ICD-10-CM

## 2021-03-03 PROCEDURE — G0283 ELEC STIM OTHER THAN WOUND: HCPCS

## 2021-03-03 PROCEDURE — 97124 MASSAGE THERAPY: CPT

## 2021-03-03 NOTE — PROGRESS NOTES
Physical Therapy Treatment Note    Date: 3/3/2021  Patient Name: Heather Collazo  : 1965   MRN: [id number]  DOInjury: 2020  DOSx: --    Referring Provider:   Kathleen Potter, DO  [address], [address]        Medical Diagnosis:    Diagnosis Orders   1. DDD (degenerative disc disease), lumbar     Also L greater trochanter pain     Outcome Measure:  Oswestry Low Back Disability Questionnaire 56% disability    S: Patient seen Dr. Ever Mcclure, at [facility] and he feels patient does not need any type of surgical intervention. Patient felt massage last treatment session has decreased pain and requested to perform again today. Add back in stretches and exercises next treatment session. O:  Time 15:30-16:15     Visit 10/12 Repeat outcome measure at mid point and end. Pain Pain 5/10     ROM Dec 50%     Low back/Gluteal soft tissue massage 15 min  MT         Modalities      MH + ES 20 min Prone  MO   Stretch      PPT  NR   SKTC  TE   Piriformis stretch  TE   Seated flexion Discussed for HEP  TE      TE   Exercise      Bridging  TE   S-lying hip ABD   TE   Clamshell s-lying (using L leg)   TE         ROWS: H   TA   ROWS: M   TA   ROWS: L   TA   Obliques - high   TA   Lawnmower Pulls   TA   Standing rectus pull down   TA   Marching   TA   Squats   TA      TA      TA      TA               A: Patient tolerated treatment well. Focused on modality approach this week due to increased pain and stiffness but will add back in stretches and exercises next session if tolerated. Soft tissue massage to improve tissue extensibility; induce relaxation; mobilize or manipulate soft tissue and joints; modulate pain; and reduce soft tissue swelling, inflammation, or restriction. Patient felt relief with massage and modalities. Discussed anatomy, physiology, body mechanics, principles of loading, and progressive loading/activity. P: Continue with rehab plan.     [facility], [facility]

## 2021-03-10 ENCOUNTER — TREATMENT (OUTPATIENT)
Dept: PHYSICAL THERAPY | Age: 56
End: 2021-03-10
Payer: COMMERCIAL

## 2021-03-10 DIAGNOSIS — M51.36 DDD (DEGENERATIVE DISC DISEASE), LUMBAR: Primary | ICD-10-CM

## 2021-03-10 PROCEDURE — 97110 THERAPEUTIC EXERCISES: CPT

## 2021-03-10 PROCEDURE — G0283 ELEC STIM OTHER THAN WOUND: HCPCS

## 2021-03-10 NOTE — PROGRESS NOTES
Physical Therapy Treatment Note    Date: 3/10/2021  Patient Name: Avani Fonseca  : 1965   MRN: [id number]  DOInjury: 2020  DOSx: --    Referring Provider:   Mateus Potter DO  [address], [address]        Medical Diagnosis:    Diagnosis Orders   1. DDD (degenerative disc disease), lumbar     Also L greater trochanter pain     Outcome Measure:  Oswestry Low Back Disability Questionnaire 56% disability    S: Patient reports left side LBP which periodically radiates into hip and left LE.   O:  Time 3301-6813     Visit  Repeat outcome measure at mid point and end. Pain Pain 5/10     ROM Dec 50%     Low back/Gluteal soft tissue massage   MT         Modalities      MH + ES X 20 min   pre mod  2 pads thoracic  2 pads lumbar Prone  MO   Stretch      PPT  NR   SKTC  TE   Piriformis stretch 3 x 30 sec hold TE   Hook lying rotation stretch 5 x 10 sec     Seated flexion Discussed for HEP  TE      TE   Exercise      Nustep L4 x 5 min         Bridging  TE   S-lying hip ABD   TE   Clamshell s-lying (using L leg)   TE         ROWS: H Green x 20 With PPT TA   ROWS: M Green x 20 With PPT TA   ROWS: L Green x 20 With PPT TA   Punches Green x 20           Obliques - high   TA   Lawnmower Pulls   TA   Standing rectus pull down   TA   Marching   TA   Squats   TA      TA   TG squats L15 2 x 10 With PPT TA      TA               A: Patient tolerated treatment well. PT geared toward strengthening, posture and core stabilization. Discuss possibility of muscle soreness secondary to addition of new exercises. Instructed to heat or ice to relieve if necessary. P: Continue with rehab plan.     Korey Sexton PTA    Treatment Charges: Mins Units   Initial Evaluation     Re-Evaluation     Ther Exercise         TE 30 2   Manual Therapy     MT     Ther Activities        TA     Gait Training          GT     Neuro Re-education NR     Modalities 20 1   Non-Billable Service Time     Other     Total Time/Units 50 3

## 2021-03-12 ENCOUNTER — TELEPHONE (OUTPATIENT)
Dept: PHYSICAL THERAPY | Age: 56
End: 2021-03-12

## 2021-03-24 ENCOUNTER — TREATMENT (OUTPATIENT)
Dept: PHYSICAL THERAPY | Age: 56
End: 2021-03-24
Payer: COMMERCIAL

## 2021-03-24 DIAGNOSIS — M51.36 DDD (DEGENERATIVE DISC DISEASE), LUMBAR: Primary | ICD-10-CM

## 2021-03-24 PROCEDURE — G0283 ELEC STIM OTHER THAN WOUND: HCPCS

## 2021-03-24 PROCEDURE — 97110 THERAPEUTIC EXERCISES: CPT

## 2021-03-24 NOTE — PROGRESS NOTES
Physical Therapy Treatment Note    Date: 3/24/2021  Patient Name: Kat Cedeño  : 1965   MRN: [id number]  DOInjury: 2020  DOSx: --    Referring Provider:   Darlene Potter, DO  [address] [facility], [address]        Medical Diagnosis:    Diagnosis Orders   1. DDD (degenerative disc disease), lumbar     Also L greater trochanter pain     Outcome Measure:  Oswestry Low Back Disability Questionnaire 56% disability    S: Patient reports continued low back pain. Patient stated she pulled her back out at work today and won't be able to tolerate resistive exercises today but agreed to [employer]. Patient is to have back surgery at the end of April. O:  Time 16:10-16:40     Visit  Repeat outcome measure at mid point and end. Pain Pain 5/10     ROM Dec 50%     Low back/Gluteal soft tissue massage   MT         Modalities      MH + ES X 20 min    Prone  MO   Stretch      PPT HEP NR   SKTC \" TE   Piriformis stretch 3 x 30 sec hold\" TE   Hook lying rotation stretch 5 x 10 sec \"    Seated flexion Discussed for HEP \" TE      TE   Exercise      Nustep L5 x 10 min         Bridging  TE   S-lying hip ABD   TE   Clamshell s-lying (using L leg)   TE         ROWS: H With PPT  TA   ROWS: M With PPT TA   ROWS: L With PPT TA   Punches           Obliques - high   TA   Lawnmower Pulls   TA   Standing rectus pull down   TA   Marching   TA   Squats   TA      TA   TG squats  With PPT  TA      TA               A: Patient tolerated treatment well. PT geared toward strengthening, posture and core stabilization. Discuss possibility of muscle soreness secondary to addition of new exercises. Instructed to heat or ice to relieve if necessary. P: Patient was discharged from services. Patient wants to save remaining visits for after surgery.    Yahaira Ross PTA    Treatment Charges: Mins Units   Initial Evaluation     Re-Evaluation     Ther Exercise         TE 10 1   Manual Therapy     MT     Ther Activities        TA     Gait Training          GT     Neuro Re-education NR     Modalities 20 1   Non-Billable Service Time     Other     Total Time/Units 30 2

## 2021-04-07 ENCOUNTER — VIRTUAL VISIT (OUTPATIENT)
Dept: FAMILY MEDICINE CLINIC | Age: 56
End: 2021-04-07
Payer: COMMERCIAL

## 2021-04-07 DIAGNOSIS — K21.9 GASTROESOPHAGEAL REFLUX DISEASE WITHOUT ESOPHAGITIS: ICD-10-CM

## 2021-04-07 DIAGNOSIS — R73.01 IFG (IMPAIRED FASTING GLUCOSE): ICD-10-CM

## 2021-04-07 DIAGNOSIS — K21.9 HIATAL HERNIA WITH GASTROESOPHAGEAL REFLUX: ICD-10-CM

## 2021-04-07 DIAGNOSIS — H66.002 ACUTE SUPPURATIVE OTITIS MEDIA OF LEFT EAR WITHOUT SPONTANEOUS RUPTURE OF TYMPANIC MEMBRANE, RECURRENCE NOT SPECIFIED: ICD-10-CM

## 2021-04-07 DIAGNOSIS — K44.9 HIATAL HERNIA WITH GASTROESOPHAGEAL REFLUX: ICD-10-CM

## 2021-04-07 DIAGNOSIS — G62.9 NEUROPATHY: ICD-10-CM

## 2021-04-07 DIAGNOSIS — M51.36 DDD (DEGENERATIVE DISC DISEASE), LUMBAR: ICD-10-CM

## 2021-04-07 DIAGNOSIS — I10 ESSENTIAL HYPERTENSION: Primary | ICD-10-CM

## 2021-04-07 DIAGNOSIS — E78.5 DYSLIPIDEMIA: ICD-10-CM

## 2021-04-07 DIAGNOSIS — R53.83 FATIGUE, UNSPECIFIED TYPE: ICD-10-CM

## 2021-04-07 PROCEDURE — G8417 CALC BMI ABV UP PARAM F/U: HCPCS | Performed by: FAMILY MEDICINE

## 2021-04-07 PROCEDURE — 1036F TOBACCO NON-USER: CPT | Performed by: FAMILY MEDICINE

## 2021-04-07 PROCEDURE — G8427 DOCREV CUR MEDS BY ELIG CLIN: HCPCS | Performed by: FAMILY MEDICINE

## 2021-04-07 PROCEDURE — 99213 OFFICE O/P EST LOW 20 MIN: CPT | Performed by: FAMILY MEDICINE

## 2021-04-07 PROCEDURE — 3017F COLORECTAL CA SCREEN DOC REV: CPT | Performed by: FAMILY MEDICINE

## 2021-04-07 RX ORDER — MONTELUKAST SODIUM 10 MG/1
TABLET ORAL
Qty: 90 TABLET | Refills: 1 | Status: SHIPPED
Start: 2021-04-07 | End: 2021-10-04

## 2021-04-07 RX ORDER — DULOXETIN HYDROCHLORIDE 60 MG/1
60 CAPSULE, DELAYED RELEASE ORAL DAILY
Qty: 90 CAPSULE | Refills: 1 | Status: SHIPPED
Start: 2021-04-07 | End: 2021-07-26 | Stop reason: CLARIF

## 2021-04-07 RX ORDER — ROSUVASTATIN CALCIUM 5 MG/1
TABLET, COATED ORAL
Qty: 90 TABLET | Refills: 1 | Status: SHIPPED
Start: 2021-04-07 | End: 2021-10-04

## 2021-04-07 RX ORDER — METOCLOPRAMIDE 10 MG/1
TABLET ORAL
Qty: 90 TABLET | Refills: 1 | Status: SHIPPED
Start: 2021-04-07 | End: 2021-10-20 | Stop reason: SDUPTHER

## 2021-04-07 RX ORDER — DULOXETIN HYDROCHLORIDE 30 MG/1
30 CAPSULE, DELAYED RELEASE ORAL DAILY
Qty: 90 CAPSULE | Refills: 1 | Status: CANCELLED | OUTPATIENT
Start: 2021-04-07

## 2021-04-07 RX ORDER — PANTOPRAZOLE SODIUM 40 MG/1
TABLET, DELAYED RELEASE ORAL
Qty: 90 TABLET | Refills: 1 | Status: SHIPPED
Start: 2021-04-07 | End: 2021-07-26 | Stop reason: SDUPTHER

## 2021-04-07 ASSESSMENT — ENCOUNTER SYMPTOMS
VOICE CHANGE: 0
ABDOMINAL PAIN: 0
BLOOD IN STOOL: 0
CHOKING: 0
EYE REDNESS: 0
EYE ITCHING: 0
VOMITING: 0
BELCHING: 0
WHEEZING: 0
HEARTBURN: 0
SHORTNESS OF BREATH: 0
PHOTOPHOBIA: 0
COUGH: 0
RECTAL PAIN: 0
SORE THROAT: 0
SINUS PRESSURE: 0
EYE PAIN: 0
FACIAL SWELLING: 0
WATER BRASH: 0
CONSTIPATION: 0
BLURRED VISION: 0
GLOBUS SENSATION: 0
HOARSE VOICE: 0
DIARRHEA: 0
NAUSEA: 0
ALLERGIC/IMMUNOLOGIC NEGATIVE: 1
BOWEL INCONTINENCE: 0
CHEST TIGHTNESS: 0
COLOR CHANGE: 0
RHINORRHEA: 0
ABDOMINAL DISTENTION: 0
BACK PAIN: 1
ANAL BLEEDING: 0
SINUS PAIN: 0
EYES NEGATIVE: 1
STRIDOR: 0
APNEA: 0
ORTHOPNEA: 0
EYE DISCHARGE: 0
TROUBLE SWALLOWING: 0

## 2021-04-07 NOTE — PROGRESS NOTES
home address in [facility] other people involved in call  , are none      On this date 4/7/2021 I have spent 30  minutes reviewing previous notes, test results and face to face (virtual) with the patient discussing the diagnosis and importance of compliance with the treatment plan as well as documenting on the day of the visit. ,     This visit was completed virtually using Doxy. me  The patient is here for a medication list and treatment planning review  We will go over our care planning goals as well as take care of all refills  We will set up labs as well   C/O swelling to her feet and lower legs             Back Pain  This is a chronic problem. The current episode started more than 1 year ago. The problem occurs constantly. The problem has been gradually worsening since onset. The pain is present in the lumbar spine. The quality of the pain is described as burning, stabbing and shooting. The pain is at a severity of 9/10. The pain is severe. The pain is the same all the time. Stiffness is present all day. Associated symptoms include leg pain. Pertinent negatives include no abdominal pain, bowel incontinence, chest pain, dysuria, headaches, paresis, paresthesias, pelvic pain, perianal numbness, weakness or weight loss. Knee Pain   The incident occurred more than 1 week ago. The pain is present in the left knee and right knee. The quality of the pain is described as shooting and stabbing. The pain is at a severity of 9/10. The pain is severe. The pain has been worsening since onset. Associated symptoms include a loss of motion and muscle weakness. Pertinent negatives include no loss of sensation. Gastroesophageal Reflux  She reports no abdominal pain, no belching, no chest pain, no choking, no coughing, no dysphagia, no early satiety, no globus sensation, no heartburn, no hoarse voice, no nausea, no sore throat, no stridor, no tooth decay, no water brash or no wheezing. This is a recurrent problem.  The current episode started more than 1 year ago. The problem occurs frequently. The problem has been gradually worsening. Associated symptoms include fatigue. Pertinent negatives include no anemia, melena, muscle weakness, orthopnea or weight loss. She has tried a PPI and a pro-motility drug for the symptoms. The treatment provided moderate relief. Past procedures include an EGD. Past procedures do not include an abdominal ultrasound, esophageal manometry, esophageal pH monitoring, H. pylori antibody titer or a UGI. Past invasive treatments do not include gastroplasty, gastroplication or reflux surgery. Hypertension  Associated symptoms include anxiety, malaise/fatigue and neck pain. Pertinent negatives include no blurred vision, chest pain, headaches, orthopnea, palpitations, peripheral edema, PND, shortness of breath or sweats. Agents associated with hypertension include NSAIDs. Risk factors for coronary artery disease include dyslipidemia, family history, post-menopausal state and obesity. Past treatments include lifestyle changes. The current treatment provides significant improvement. Compliance problems include psychosocial issues, exercise and diet. There is no history of angina, kidney disease, CAD/MI, CVA, heart failure, left ventricular hypertrophy, PVD or retinopathy. Identifiable causes of hypertension include a thyroid problem. There is no history of chronic renal disease, coarctation of the aorta, hyperaldosteronism, hypercortisolism, hyperparathyroidism, a hypertension causing med, pheochromocytoma, renovascular disease or sleep apnea. ROS:  Review of Systems   Constitutional: Positive for fatigue and malaise/fatigue. Negative for activity change, appetite change, chills, diaphoresis, unexpected weight change and weight loss.    HENT: Negative for congestion, dental problem, drooling, ear discharge, ear pain, facial swelling, hearing loss, hoarse voice, mouth sores, nosebleeds, postnasal drip, Awake/Alert/Oriented/No Acute Distress      ASSESSMENT/PLAN  Grecia was seen today for foot pain. Diagnoses and all orders for this visit:    Essential hypertension  -     Basic Metabolic Panel; Future  -     CBC Auto Differential; Future  --patient is instructed on low to moderate sodium ( 2 to 2.5 grams ), daily    Also to increase potassium in the diet to about 3.5 grams daily    Literature is provided       Hiatal hernia with gastroesophageal reflux  -     metoclopramide (REGLAN) 10 MG tablet; TAKE 1 TABLET BY MOUTH EVERY DAY  -     Basic Metabolic Panel; Future  -     CBC Auto Differential; Future  Long talk on treatment and prevention  Literature is given       DDD (degenerative disc disease), lumbar  -     diclofenac sodium (VOLTAREN) 1 % GEL; Apply 2 g topically 4 times daily as needed for Pain  -     Basic Metabolic Panel; Future  -     CBC Auto Differential; Future    Gastroesophageal reflux disease without esophagitis  -     pantoprazole (PROTONIX) 40 MG tablet; TAKE 1 TABLET BY MOUTH EVERY DAY  -     Basic Metabolic Panel; Future  -     CBC Auto Differential; Future  Long talk on treatment and prevention  Literature is given       Acute suppurative otitis media of left ear without spontaneous rupture of tympanic membrane, recurrence not specified  -     montelukast (SINGULAIR) 10 MG tablet; TAKE 1 TABLET BY MOUTH EVERY DAY  -     Basic Metabolic Panel; Future  -     CBC Auto Differential; Future    IFG (impaired fasting glucose)  -     Basic Metabolic Panel; Future  -     CBC Auto Differential; Future  -     Hemoglobin A1C; Future    ---VASCULAR PANEL  A) ASA, plavix, aggrenox  B) coumadin, pletal, tzd, STATIN  C) ace, hctz, folic, ccb  D) cannikinumab, fish oils     ---CARDIAC---ASA, ace, beta, STATIN, hctz, ( ccb )    Dyslipidemia  -     rosuvastatin (CRESTOR) 5 MG tablet; TAKE 1 TABLET BY MOUTH EVERY DAY  -     Basic Metabolic Panel; Future  -     Lipid Panel;  Future  -     CBC Auto Differential; Future  --Mediterranean diet, exercise, weight loss, vitamins    We have a long talk on cholesterol and importance of lowering it       Fatigue, unspecified type  -     TSH without Reflex; Future  -     Uric Acid; Future  -     Basic Metabolic Panel; Future  -     CBC Auto Differential; Future    Neuropathy  -     DULoxetine (CYMBALTA) 60 MG extended release capsule; Take 1 capsule by mouth daily  -     Basic Metabolic Panel; Future  -     CBC Auto Differential; Future        Outpatient Encounter Medications as of 4/7/2021   Medication Sig Dispense Refill    metoclopramide (REGLAN) 10 MG tablet TAKE 1 TABLET BY MOUTH EVERY DAY 90 tablet 1    pantoprazole (PROTONIX) 40 MG tablet TAKE 1 TABLET BY MOUTH EVERY DAY 90 tablet 1    rosuvastatin (CRESTOR) 5 MG tablet TAKE 1 TABLET BY MOUTH EVERY DAY 90 tablet 1    montelukast (SINGULAIR) 10 MG tablet TAKE 1 TABLET BY MOUTH EVERY DAY 90 tablet 1    diclofenac sodium (VOLTAREN) 1 % GEL Apply 2 g topically 4 times daily as needed for Pain 100 g 3    DULoxetine (CYMBALTA) 60 MG extended release capsule Take 1 capsule by mouth daily 90 capsule 1    traMADol (ULTRAM) 50 MG tablet Take 1 tablet by mouth every 6 hours as needed for Pain for up to 30 days. Intended supply: 30 days. Take lowest dose possible to manage pain 120 tablet 0    acetaminophen (TYLENOL) 325 MG tablet TAKE 2 TABLETS BY MOUTH EVERY 4 HOURS AS NEEDED FOR PAIN 120 tablet 3    ibuprofen (ADVIL;MOTRIN) 600 MG tablet Take 1 tablet by mouth 3 times daily as needed for Pain 90 tablet 5    Menthol, Topical Analgesic, (BIOFREEZE EX) Apply 1 each topically 2 times daily as needed (Joint Pain)      docusate sodium (DULCOLAX) 100 MG capsule Take 1 capsule by mouth 3 times daily as needed.  270 capsule 1    [DISCONTINUED] diclofenac sodium (VOLTAREN) 1 % GEL Apply 2 g topically 4 times daily as needed for Pain 100 g 3    [DISCONTINUED] DULoxetine (CYMBALTA) 30 MG extended release capsule Take 1 capsule by mouth daily (Patient not taking: Reported on 4/7/2021) 90 capsule 1    [DISCONTINUED] metoclopramide (REGLAN) 10 MG tablet TAKE 1 TABLET BY MOUTH EVERY DAY 90 tablet 1    [DISCONTINUED] pantoprazole (PROTONIX) 40 MG tablet TAKE 1 TABLET BY MOUTH EVERY DAY (Patient not taking: Reported on 4/7/2021) 90 tablet 1    [DISCONTINUED] rosuvastatin (CRESTOR) 5 MG tablet TAKE 1 TABLET BY MOUTH EVERY DAY (Patient not taking: Reported on 4/7/2021) 90 tablet 1    [DISCONTINUED] montelukast (SINGULAIR) 10 MG tablet TAKE 1 TABLET BY MOUTH EVERY DAY (Patient not taking: Reported on 4/7/2021) 90 tablet 1    aspirin 81 MG tablet Take 81 mg by mouth 2 times daily       No facility-administered encounter medications on file as of 4/7/2021. No follow-ups on file.         Reviewed recent labs related to Grecia's current problems      Discussed importance of regular Health Maintenance follow up  Health Maintenance   Topic    Shingles Vaccine (1 of 2)    Colon cancer screen colonoscopy     DTaP/Tdap/Td vaccine (2 - Td)    Cervical cancer screen     Lipid screen     TSH testing     Breast cancer screen     Flu vaccine     Pneumococcal 0-64 years Vaccine     COVID-19 Vaccine     Hepatitis C screen     HIV screen     Hepatitis A vaccine     Hepatitis B vaccine     Hib vaccine     Meningococcal (ACWY) vaccine

## 2021-07-26 ENCOUNTER — OFFICE VISIT (OUTPATIENT)
Dept: FAMILY MEDICINE CLINIC | Age: 56
End: 2021-07-26
Payer: COMMERCIAL

## 2021-07-26 VITALS
SYSTOLIC BLOOD PRESSURE: 122 MMHG | TEMPERATURE: 98 F | OXYGEN SATURATION: 97 % | RESPIRATION RATE: 18 BRPM | HEIGHT: 66 IN | BODY MASS INDEX: 31.18 KG/M2 | HEART RATE: 83 BPM | WEIGHT: 194 LBS | DIASTOLIC BLOOD PRESSURE: 82 MMHG

## 2021-07-26 DIAGNOSIS — E78.5 DYSLIPIDEMIA: ICD-10-CM

## 2021-07-26 DIAGNOSIS — K44.9 HIATAL HERNIA WITH GASTROESOPHAGEAL REFLUX: Primary | ICD-10-CM

## 2021-07-26 DIAGNOSIS — K44.9 HIATAL HERNIA WITH GASTROESOPHAGEAL REFLUX: ICD-10-CM

## 2021-07-26 DIAGNOSIS — M51.36 DDD (DEGENERATIVE DISC DISEASE), LUMBAR: ICD-10-CM

## 2021-07-26 DIAGNOSIS — G62.9 NEUROPATHY: ICD-10-CM

## 2021-07-26 DIAGNOSIS — R10.9 ACUTE FLANK PAIN: ICD-10-CM

## 2021-07-26 DIAGNOSIS — Z12.11 SCREEN FOR COLON CANCER: ICD-10-CM

## 2021-07-26 DIAGNOSIS — K21.9 GASTROESOPHAGEAL REFLUX DISEASE WITHOUT ESOPHAGITIS: ICD-10-CM

## 2021-07-26 DIAGNOSIS — E78.5 HYPERLIPIDEMIA, UNSPECIFIED HYPERLIPIDEMIA TYPE: ICD-10-CM

## 2021-07-26 DIAGNOSIS — H66.002 ACUTE SUPPURATIVE OTITIS MEDIA OF LEFT EAR WITHOUT SPONTANEOUS RUPTURE OF TYMPANIC MEMBRANE, RECURRENCE NOT SPECIFIED: ICD-10-CM

## 2021-07-26 DIAGNOSIS — R53.83 FATIGUE, UNSPECIFIED TYPE: ICD-10-CM

## 2021-07-26 DIAGNOSIS — K21.9 HIATAL HERNIA WITH GASTROESOPHAGEAL REFLUX: Primary | ICD-10-CM

## 2021-07-26 DIAGNOSIS — R73.01 IFG (IMPAIRED FASTING GLUCOSE): ICD-10-CM

## 2021-07-26 DIAGNOSIS — I10 ESSENTIAL HYPERTENSION: ICD-10-CM

## 2021-07-26 DIAGNOSIS — K21.9 HIATAL HERNIA WITH GASTROESOPHAGEAL REFLUX: ICD-10-CM

## 2021-07-26 LAB
ANION GAP SERPL CALCULATED.3IONS-SCNC: 12 MMOL/L (ref 7–16)
BASOPHILS ABSOLUTE: 0.03 E9/L (ref 0–0.2)
BASOPHILS RELATIVE PERCENT: 0.9 % (ref 0–2)
BILIRUBIN, POC: NEGATIVE
BLOOD URINE, POC: NEGATIVE
BUN BLDV-MCNC: 9 MG/DL (ref 6–20)
CALCIUM SERPL-MCNC: 9.5 MG/DL (ref 8.6–10.2)
CHLORIDE BLD-SCNC: 104 MMOL/L (ref 98–107)
CHOLESTEROL, TOTAL: 213 MG/DL (ref 0–199)
CLARITY, POC: CLEAR
CO2: 28 MMOL/L (ref 22–29)
COLOR, POC: YELLOW
CREAT SERPL-MCNC: 0.9 MG/DL (ref 0.5–1)
EOSINOPHILS ABSOLUTE: 0.05 E9/L (ref 0.05–0.5)
EOSINOPHILS RELATIVE PERCENT: 1.6 % (ref 0–6)
GFR AFRICAN AMERICAN: >60
GFR NON-AFRICAN AMERICAN: >60 ML/MIN/1.73
GLUCOSE BLD-MCNC: 73 MG/DL (ref 74–99)
GLUCOSE URINE, POC: NEGATIVE
HBA1C MFR BLD: 4.9 % (ref 4–5.6)
HCT VFR BLD CALC: 39.6 % (ref 34–48)
HDLC SERPL-MCNC: 53 MG/DL
HEMOGLOBIN: 12.3 G/DL (ref 11.5–15.5)
IMMATURE GRANULOCYTES #: 0.01 E9/L
IMMATURE GRANULOCYTES %: 0.3 % (ref 0–5)
KETONES, POC: NEGATIVE
LDL CHOLESTEROL CALCULATED: 123 MG/DL (ref 0–99)
LEUKOCYTE EST, POC: NORMAL
LYMPHOCYTES ABSOLUTE: 1.2 E9/L (ref 1.5–4)
LYMPHOCYTES RELATIVE PERCENT: 37.4 % (ref 20–42)
MCH RBC QN AUTO: 28.9 PG (ref 26–35)
MCHC RBC AUTO-ENTMCNC: 31.1 % (ref 32–34.5)
MCV RBC AUTO: 93.2 FL (ref 80–99.9)
MONOCYTES ABSOLUTE: 0.45 E9/L (ref 0.1–0.95)
MONOCYTES RELATIVE PERCENT: 14 % (ref 2–12)
NEUTROPHILS ABSOLUTE: 1.47 E9/L (ref 1.8–7.3)
NEUTROPHILS RELATIVE PERCENT: 45.8 % (ref 43–80)
NITRITE, POC: NEGATIVE
PDW BLD-RTO: 13.9 FL (ref 11.5–15)
PH, POC: 5.5
PLATELET # BLD: 209 E9/L (ref 130–450)
PMV BLD AUTO: 12.9 FL (ref 7–12)
POTASSIUM SERPL-SCNC: 4.9 MMOL/L (ref 3.5–5)
PROTEIN, POC: NORMAL
RBC # BLD: 4.25 E12/L (ref 3.5–5.5)
SODIUM BLD-SCNC: 144 MMOL/L (ref 132–146)
SPECIFIC GRAVITY, POC: 1.02
TRIGL SERPL-MCNC: 185 MG/DL (ref 0–149)
TSH SERPL DL<=0.05 MIU/L-ACNC: 1.62 UIU/ML (ref 0.27–4.2)
URIC ACID, SERUM: 4.6 MG/DL (ref 2.4–5.7)
UROBILINOGEN, POC: 0.2
VLDLC SERPL CALC-MCNC: 37 MG/DL
WBC # BLD: 3.2 E9/L (ref 4.5–11.5)

## 2021-07-26 PROCEDURE — G8427 DOCREV CUR MEDS BY ELIG CLIN: HCPCS | Performed by: FAMILY MEDICINE

## 2021-07-26 PROCEDURE — G8417 CALC BMI ABV UP PARAM F/U: HCPCS | Performed by: FAMILY MEDICINE

## 2021-07-26 PROCEDURE — 1036F TOBACCO NON-USER: CPT | Performed by: FAMILY MEDICINE

## 2021-07-26 PROCEDURE — 3017F COLORECTAL CA SCREEN DOC REV: CPT | Performed by: FAMILY MEDICINE

## 2021-07-26 PROCEDURE — 81003 URINALYSIS AUTO W/O SCOPE: CPT | Performed by: FAMILY MEDICINE

## 2021-07-26 PROCEDURE — 99214 OFFICE O/P EST MOD 30 MIN: CPT | Performed by: FAMILY MEDICINE

## 2021-07-26 RX ORDER — PANTOPRAZOLE SODIUM 40 MG/1
TABLET, DELAYED RELEASE ORAL
Qty: 90 TABLET | Refills: 1 | Status: SHIPPED
Start: 2021-07-26 | End: 2021-10-20 | Stop reason: CLARIF

## 2021-07-26 RX ORDER — IBUPROFEN 600 MG/1
600 TABLET ORAL 3 TIMES DAILY PRN
Qty: 270 TABLET | Refills: 1 | Status: SHIPPED
Start: 2021-07-26 | End: 2021-10-20 | Stop reason: SDUPTHER

## 2021-07-26 RX ORDER — ACETAMINOPHEN 325 MG/1
650 TABLET ORAL EVERY 4 HOURS PRN
Qty: 1080 TABLET | Refills: 1 | Status: SHIPPED
Start: 2021-07-26 | End: 2021-10-20 | Stop reason: SDUPTHER

## 2021-07-26 SDOH — ECONOMIC STABILITY: FOOD INSECURITY: WITHIN THE PAST 12 MONTHS, THE FOOD YOU BOUGHT JUST DIDN'T LAST AND YOU DIDN'T HAVE MONEY TO GET MORE.: NEVER TRUE

## 2021-07-26 SDOH — ECONOMIC STABILITY: FOOD INSECURITY: WITHIN THE PAST 12 MONTHS, YOU WORRIED THAT YOUR FOOD WOULD RUN OUT BEFORE YOU GOT MONEY TO BUY MORE.: NEVER TRUE

## 2021-07-26 ASSESSMENT — PATIENT HEALTH QUESTIONNAIRE - PHQ9
2. FEELING DOWN, DEPRESSED OR HOPELESS: 0
SUM OF ALL RESPONSES TO PHQ QUESTIONS 1-9: 0
SUM OF ALL RESPONSES TO PHQ QUESTIONS 1-9: 0
1. LITTLE INTEREST OR PLEASURE IN DOING THINGS: 0
SUM OF ALL RESPONSES TO PHQ9 QUESTIONS 1 & 2: 0
SUM OF ALL RESPONSES TO PHQ QUESTIONS 1-9: 0

## 2021-07-26 ASSESSMENT — SOCIAL DETERMINANTS OF HEALTH (SDOH): HOW HARD IS IT FOR YOU TO PAY FOR THE VERY BASICS LIKE FOOD, HOUSING, MEDICAL CARE, AND HEATING?: NOT HARD AT ALL

## 2021-07-27 ENCOUNTER — CLINICAL DOCUMENTATION (OUTPATIENT)
Dept: FAMILY MEDICINE CLINIC | Age: 56
End: 2021-07-27

## 2021-07-27 PROBLEM — R10.9 ACUTE FLANK PAIN: Status: ACTIVE | Noted: 2021-07-27

## 2021-07-27 PROBLEM — Z12.11 SCREEN FOR COLON CANCER: Status: ACTIVE | Noted: 2021-07-27

## 2021-07-27 ASSESSMENT — ENCOUNTER SYMPTOMS
NAUSEA: 0
WHEEZING: 0
EYE DISCHARGE: 0
EYES NEGATIVE: 1
COUGH: 0
PHOTOPHOBIA: 0
SINUS PAIN: 0
BACK PAIN: 1
ABDOMINAL PAIN: 0
SHORTNESS OF BREATH: 0
TROUBLE SWALLOWING: 0
BLOOD IN STOOL: 0
SORE THROAT: 0
VOMITING: 0
DIARRHEA: 0
EYE ITCHING: 0
ALLERGIC/IMMUNOLOGIC NEGATIVE: 1
RECTAL PAIN: 0
COLOR CHANGE: 0
ANAL BLEEDING: 0
FACIAL SWELLING: 0
RHINORRHEA: 0
STRIDOR: 0
CHEST TIGHTNESS: 0
ABDOMINAL DISTENTION: 0
APNEA: 0
CONSTIPATION: 1
EYE PAIN: 0
EYE REDNESS: 0
CHOKING: 0
VOICE CHANGE: 0
SINUS PRESSURE: 0

## 2021-07-27 NOTE — PROGRESS NOTES
The 10-year ASCVD risk score (Mahin Trevino et al., 2013) is: 2.7%    Values used to calculate the score:      Age: 60 years      Sex: Female      Is Non- : No      Diabetic: No      Tobacco smoker: No      Systolic Blood Pressure: 377 mmHg      Is BP treated: No      HDL Cholesterol: 53 mg/dL      Total Cholesterol: 213 mg/dL

## 2021-07-28 NOTE — PROGRESS NOTES
Fely Sow is a 60 y.o. female. HPI/Chief C/O:  Chief Complaint   Patient presents with    Urinary Tract Infection     Pt c/o UTI symptoms x1 month, recently started experiencing flank pain as well, frequency, urgency, bladder leaks, odor    Health Maintenance     Pt amendable to referral to Gen Surg for constipation/gerd/colonoscopy     Allergies   Allergen Reactions    Penicillins Anaphylaxis    Dilaudid [Hydromorphone Hcl] Nausea And Vomiting    Demerol Hcl [Meperidine] Hives    Morphine      Can take alone, not with demerol    Sulfa Antibiotics Rash    Tape Elige Lathe Tape] Rash     This 60year old female presents for physical exam. Pt has constipation, GERD, will set up GI specialist. Pt has DDD lumbar, and hyperlipidemia. Pt c/o urinary incontinence, and frequency with urgency, and fleank pain. Pt denies dysuria. ROS:  Review of Systems   Constitutional: Positive for fatigue. Negative for activity change, appetite change, chills, diaphoresis, fever and unexpected weight change. HENT: Negative for congestion, dental problem, drooling, ear discharge, ear pain, facial swelling, hearing loss, mouth sores, nosebleeds, postnasal drip, rhinorrhea, sinus pressure, sinus pain, sneezing, sore throat, tinnitus, trouble swallowing and voice change. Eyes: Negative. Negative for photophobia, pain, discharge, redness, itching and visual disturbance. Respiratory: Negative for apnea, cough, choking, chest tightness, shortness of breath, wheezing and stridor. Cardiovascular: Negative. Negative for chest pain, palpitations and leg swelling. Gastrointestinal: Positive for constipation. Negative for abdominal distention, abdominal pain, anal bleeding, blood in stool, diarrhea, nausea, rectal pain and vomiting. Endocrine: Negative. Negative for cold intolerance, heat intolerance, polydipsia, polyphagia and polyuria. Genitourinary: Positive for flank pain and frequency.  Negative for decreased urine volume, difficulty urinating, dyspareunia, dysuria, enuresis, genital sores, hematuria, menstrual problem, pelvic pain and urgency. Musculoskeletal: Positive for arthralgias, back pain, myalgias and neck pain. Negative for gait problem, joint swelling and neck stiffness. Skin: Negative. Negative for color change, pallor, rash and wound. Allergic/Immunologic: Negative. Neurological: Positive for numbness (into her left leg). Negative for dizziness, tremors, seizures, syncope, facial asymmetry, speech difficulty, weakness, light-headedness and headaches. Hematological: Negative. Negative for adenopathy. Does not bruise/bleed easily. Psychiatric/Behavioral: Negative for agitation, behavioral problems, confusion, decreased concentration, dysphoric mood, hallucinations, self-injury, sleep disturbance and suicidal ideas. The patient is nervous/anxious. The patient is not hyperactive.          Past Medical/Surgical Hx;  Reviewed with patient      Diagnosis Date    Arthritis     knee    Asthma     Cancer ([facility] [address] 07/2007    ovarian    H/O cardiovascular stress test 06/13/2017    lexiscan    Hiatal hernia with gastroesophageal reflux     Hx of cardiovascular stress test 10/2012    lexiscan nuclear stress    Hyperlipidemia     Hypoactive thyroid     no meds    Osteoarthritis     PONV (postoperative nausea and vomiting)     Preoperative clearance 01/09/2017    Medical clearance from Dr. Sampson Forward in EPIC notes    Ulcer of gastroesophageal junction      Past Surgical History:   Procedure Laterality Date    CHOLECYSTECTOMY      COLONOSCOPY      FRACTURE SURGERY Right 2005    humerus, hardware in place   [address]  07/18/2007    and bso    JOINT REPLACEMENT Left 11/2018    KNEE ARTHROPLASTY Right 02/02/2017    KNEE ARTHROSCOPY  right    LUMBAR FUSION  05/27/2021    @ [facility] / Dr. Calos Terna 2014    bilateral lumbar laminectomy L4 and L5 excision of a spinal tumor    NERVE BLOCK N/A 05/15/2013    lumb ep #1    NERVE BLOCK N/A 2013    lumbar epidural nerve block #2    NERVE BLOCK  2013    lumbar epidural #3    TOTAL KNEE ARTHROPLASTY Right     TUBAL LIGATION      UPPER GASTROINTESTINAL ENDOSCOPY         Past Family Hx:  Reviewed with patient      Problem Relation Age of Onset    Emphysema Mother     Migraines Mother     Cancer Mother     Arthritis Other     Cancer Other     Heart Disease Other        Social Hx:  Reviewed with patient  Social History     Tobacco Use    Smoking status: Former Smoker     Packs/day: 0.50     Years: 1.00     Pack years: 0.50     Types: Cigarettes     Start date: 1977     Quit date: 1978     Years since quittin.0    Smokeless tobacco: Never Used    Tobacco comment: quit many years ago   Substance Use Topics    Alcohol use: Yes     Alcohol/week: 2.0 standard drinks     Types: 2 Glasses of wine per week     Comment: social       OBJECTIVE  /82   Pulse 83   Temp 98 °F (36.7 °C) (Temporal)   Resp 18   Ht 5' 5.5\" (1.664 m)   Wt 194 lb (88 kg)   LMP  (LMP Unknown)   SpO2 97%   Breastfeeding No   BMI 31.79 kg/m²     Problem List:  Peola Erm does not have any pertinent problems on file. PHYS EX:  Physical Exam  Vitals and nursing note reviewed. Constitutional:       General: She is not in acute distress. Appearance: Normal appearance. She is well-developed. She is obese. She is not ill-appearing, toxic-appearing or diaphoretic. Comments: Patient has morbid obesity. Patient instructed on low calorie, healthy diet. HENT:      Head: Normocephalic and atraumatic. Nose: Nose normal. No congestion or rhinorrhea. Eyes:      General: No scleral icterus. Right eye: No discharge. Left eye: No discharge. Extraocular Movements: Extraocular movements intact.       Conjunctiva/sclera: Conjunctivae normal.      Pupils: Pupils are equal, round, and reactive to light. Neck:      Thyroid: No thyromegaly. Vascular: No carotid bruit or JVD. Trachea: No tracheal deviation. Cardiovascular:      Rate and Rhythm: Normal rate and regular rhythm. Pulses: Normal pulses. Heart sounds: Normal heart sounds. No murmur heard. No friction rub. No gallop. Pulmonary:      Effort: Pulmonary effort is normal. No respiratory distress. Breath sounds: Normal breath sounds. No stridor. No wheezing, rhonchi or rales. Chest:      Chest wall: No tenderness. Abdominal:      General: Bowel sounds are normal. There is no distension. Palpations: Abdomen is soft. There is no mass. Tenderness: There is no abdominal tenderness. There is no right CVA tenderness, left CVA tenderness, guarding or rebound. Hernia: No hernia is present. Musculoskeletal:         General: Tenderness present. No swelling, deformity or signs of injury. Cervical back: Normal range of motion and neck supple. No rigidity. No muscular tenderness. Lumbar back: Spasms, tenderness and bony tenderness present. No swelling, edema, deformity or lacerations. Decreased range of motion. Back:       Right knee: Bony tenderness present. No swelling, deformity, effusion, erythema, ecchymosis or lacerations. Decreased range of motion. Tenderness present over the patellar tendon. No medial joint line, lateral joint line, MCL or LCL tenderness. No LCL laxity or MCL laxity. Normal alignment, normal meniscus and normal patellar mobility. Right lower leg: No edema. Left lower leg: No edema. Legs:    Lymphadenopathy:      Cervical: No cervical adenopathy. Skin:     General: Skin is warm. Coloration: Skin is not jaundiced or pale. Findings: No bruising, erythema, lesion or rash. Neurological:      General: No focal deficit present.       Mental Status: She is alert and oriented to person, place, and time. Cranial Nerves: No cranial nerve deficit. Sensory: Sensory deficit (into the left sciatic nerve) present. Motor: No weakness or abnormal muscle tone. Coordination: Coordination normal.      Gait: Gait normal.      Deep Tendon Reflexes: Reflexes are normal and symmetric. Reflexes normal.      Comments: Radicular pain into her feet   Psychiatric:         Mood and Affect: Mood normal.         Behavior: Behavior normal.         Thought Content: Thought content normal.         Judgment: Judgment normal.         ASSESSMENT/PLAN  Grecia was seen today for urinary tract infection and health maintenance. Diagnoses and all orders for this visit:    Hiatal hernia with gastroesophageal reflux  -     Mayi Jones MD,Gastroenterology, [place] ([facility])    DDD (degenerative disc disease), lumbar  -     acetaminophen (TYLENOL) 325 MG tablet; Take 2 tablets by mouth every 4 hours as needed for Pain  -     ibuprofen (ADVIL;MOTRIN) 600 MG tablet; Take 1 tablet by mouth 3 times daily as needed for Pain    Gastroesophageal reflux disease without esophagitis  -     pantoprazole (PROTONIX) 40 MG tablet; TAKE 1 TABLET BY MOUTH EVERY DAY  -     [facility]    Hyperlipidemia, unspecified hyperlipidemia type  Stabl. Statin, low chol. Diet. Screen for colon cancer  [facility]    Acute flank pain  -     POCT Urinalysis No Micro (Auto)  -     US RETROPERITONEAL COMPLETE; Future      Pt instructed if any worse go ED ASAP.     Outpatient Encounter Medications as of 7/26/2021   Medication Sig Dispense Refill    acetaminophen (TYLENOL) 325 MG tablet Take 2 tablets by mouth every 4 hours as needed for Pain 1080 tablet 1    ibuprofen (ADVIL;MOTRIN) 600 MG tablet Take 1 tablet by mouth 3 times daily as needed for Pain 270 tablet 1    pantoprazole (PROTONIX) 40 MG tablet TAKE 1 TABLET BY MOUTH EVERY DAY 90 tablet 1    traMADol (ULTRAM) 50 MG tablet Take 1 tablet by mouth every 6 hours as needed for Pain for up to 30 days. Intended supply: 30 days. Take lowest dose possible to manage pain 120 tablet 0    metoclopramide (REGLAN) 10 MG tablet TAKE 1 TABLET BY MOUTH EVERY DAY 90 tablet 1    rosuvastatin (CRESTOR) 5 MG tablet TAKE 1 TABLET BY MOUTH EVERY DAY 90 tablet 1    montelukast (SINGULAIR) 10 MG tablet TAKE 1 TABLET BY MOUTH EVERY DAY 90 tablet 1    Menthol, Topical Analgesic, (BIOFREEZE EX) Apply 1 each topically 2 times daily as needed (Joint Pain)       aspirin 81 MG tablet Take 81 mg by mouth 2 times daily      docusate sodium (DULCOLAX) 100 MG capsule Take 1 capsule by mouth 3 times daily as needed. 270 capsule 1    [DISCONTINUED] pantoprazole (PROTONIX) 40 MG tablet TAKE 1 TABLET BY MOUTH EVERY DAY 90 tablet 1    [DISCONTINUED] diclofenac sodium (VOLTAREN) 1 % GEL Apply 2 g topically 4 times daily as needed for Pain 100 g 3    [DISCONTINUED] DULoxetine (CYMBALTA) 60 MG extended release capsule Take 1 capsule by mouth daily 90 capsule 1    [DISCONTINUED] acetaminophen (TYLENOL) 325 MG tablet TAKE 2 TABLETS BY MOUTH EVERY 4 HOURS AS NEEDED FOR PAIN 120 tablet 3    [DISCONTINUED] ibuprofen (ADVIL;MOTRIN) 600 MG tablet Take 1 tablet by mouth 3 times daily as needed for Pain 90 tablet 5     No facility-administered encounter medications on file as of 7/26/2021. No follow-ups on file.         Reviewed recent labs related to Grecia's current problems      Discussed importance of regular Health Maintenance follow up  Health Maintenance   Topic    Colon cancer screen colonoscopy     Shingles Vaccine (1 of 2)    DTaP/Tdap/Td vaccine (2 - Td or Tdap)    Cervical cancer screen     Flu vaccine (1)    Lipid screen     TSH testing     Breast cancer screen     Pneumococcal 0-64 years Vaccine (2 of 2 - PPSV23)    COVID-19 Vaccine     Hepatitis C screen     HIV screen     Hepatitis A vaccine     Hepatitis B vaccine     Hib vaccine     Meningococcal (ACWY) vaccine

## 2021-08-05 ENCOUNTER — TELEPHONE (OUTPATIENT)
Dept: FAMILY MEDICINE CLINIC | Age: 56
End: 2021-08-05

## 2021-08-05 DIAGNOSIS — R32 URINARY INCONTINENCE, UNSPECIFIED TYPE: Primary | ICD-10-CM

## 2021-08-05 NOTE — TELEPHONE ENCOUNTER
This MA spoke with patient. She would like referral to Urology. Referral placed.   Electronically signed by Maikel Montalvo on 8/5/2021 at 10:15 AM

## 2021-08-05 NOTE — TELEPHONE ENCOUNTER
----- Message from Sheilla Dakins, DO sent at 7/30/2021  3:36 PM EDT -----  Set up urology due to urinary incontinence   let pt know

## 2021-08-26 PROBLEM — Z12.11 SCREEN FOR COLON CANCER: Status: RESOLVED | Noted: 2021-07-27 | Resolved: 2021-08-26

## 2021-09-29 ENCOUNTER — NURSE TRIAGE (OUTPATIENT)
Dept: OTHER | Facility: CLINIC | Age: 56
End: 2021-09-29

## 2021-09-29 NOTE — TELEPHONE ENCOUNTER
Received call from Janiya Cortez at [facility] with Red Flag Complaint. Brief description of triage: left thigh painful and swollen since May    Triage indicates for patient to see HCP within 4 hours. Says she wants to come to office, and will call in the morning. Says this has been there since before May. Care advice provided, patient verbalizes understanding; denies any other questions or concerns; instructed to call back for any new or worsening symptoms. Attention Provider: Thank you for allowing me to participate in the care of your patient. The patient was connected to triage in response to information provided to the ECC/PSC. Please do not respond through this encounter as the response is not directed to a shared pool. Reason for Disposition   SEVERE leg swelling (e.g., swelling extends above knee, entire leg is swollen, weeping fluid)    Answer Assessment - Initial Assessment Questions  1. ONSET: \"When did the swelling start? \" (e.g., minutes, hours, days)      Had recent back fusion (May) and thought this was related to the surgery, but noticed that left leg is swollen, painful in thigh and swollen to foot since before May. 2. LOCATION: \"What part of the leg is swollen? \"  \"Are both legs swollen or just one leg? \"      Left thigh to foot    3. SEVERITY: \"How bad is the swelling? \" (e.g., localized; mild, moderate, severe)   - Localized - small area of swelling localized to one leg   - MILD pedal edema - swelling limited to foot and ankle, pitting edema < 1/4 inch (6 mm) deep, rest and elevation eliminate most or all swelling   - MODERATE edema - swelling of lower leg to knee, pitting edema > 1/4 inch (6 mm) deep, rest and elevation only partially reduce swelling   - SEVERE edema - swelling extends above knee, facial or hand swelling present       Severe edema    4. REDNESS: \"Does the swelling look red or infected? \"      Looks normal    5. PAIN: \"Is the swelling painful to touch? \" If so, ask: \"How painful is it? \"   (Scale 1-10; mild, moderate or severe)      Can be a 10/10, feels better when moving and now is 7/10    6. FEVER: \"Do you have a fever? \" If so, ask: \"What is it, how was it measured, and when did it start? \"       No    7. CAUSE: \"What do you think is causing the leg swelling? \"      She thought it was related to her back but now back pain is gone and still has this thigh pain and edema of left leg    8. MEDICAL HISTORY: \"Do you have a history of heart failure, kidney disease, liver failure, or cancer? \"      Hx of mitral valve prolapse, total hysterectomy -saw cyst on ovaries and was malignant, but pathology showed no cancer    9. RECURRENT SYMPTOM: \"Have you had leg swelling before? \" If so, ask: \"When was the last time? \" \"What happened that time? \"      Yes, related to her back pain and with osteoarthritis in hips    10. OTHER SYMPTOMS: \"Do you have any other symptoms? \" (e.g., chest pain, difficulty breathing)        No    11. PREGNANCY: \"Is there any chance you are pregnant? \" \"When was your last menstrual period? \"        N/a    Protocols used: LEG SWELLING AND EDEMA-ADULT-

## 2021-10-04 DIAGNOSIS — E78.5 DYSLIPIDEMIA: ICD-10-CM

## 2021-10-04 DIAGNOSIS — H66.002 ACUTE SUPPURATIVE OTITIS MEDIA OF LEFT EAR WITHOUT SPONTANEOUS RUPTURE OF TYMPANIC MEMBRANE, RECURRENCE NOT SPECIFIED: ICD-10-CM

## 2021-10-04 RX ORDER — ROSUVASTATIN CALCIUM 5 MG/1
TABLET, COATED ORAL
Qty: 90 TABLET | Refills: 0 | Status: SHIPPED
Start: 2021-10-04 | End: 2022-05-09 | Stop reason: SDUPTHER

## 2021-10-04 RX ORDER — MONTELUKAST SODIUM 10 MG/1
TABLET ORAL
Qty: 90 TABLET | Refills: 0 | Status: SHIPPED
Start: 2021-10-04 | End: 2021-10-20 | Stop reason: SDUPTHER

## 2021-10-20 ENCOUNTER — OFFICE VISIT (OUTPATIENT)
Dept: FAMILY MEDICINE CLINIC | Age: 56
End: 2021-10-20
Payer: COMMERCIAL

## 2021-10-20 VITALS
SYSTOLIC BLOOD PRESSURE: 122 MMHG | HEIGHT: 66 IN | BODY MASS INDEX: 31.66 KG/M2 | RESPIRATION RATE: 18 BRPM | WEIGHT: 197 LBS | DIASTOLIC BLOOD PRESSURE: 80 MMHG | TEMPERATURE: 98.4 F | OXYGEN SATURATION: 97 % | HEART RATE: 79 BPM

## 2021-10-20 DIAGNOSIS — M25.552 LEFT HIP PAIN: ICD-10-CM

## 2021-10-20 DIAGNOSIS — K21.9 HIATAL HERNIA WITH GASTROESOPHAGEAL REFLUX: ICD-10-CM

## 2021-10-20 DIAGNOSIS — M51.36 DDD (DEGENERATIVE DISC DISEASE), LUMBAR: ICD-10-CM

## 2021-10-20 DIAGNOSIS — M79.605 PAIN OF LEFT LOWER EXTREMITY: Primary | ICD-10-CM

## 2021-10-20 DIAGNOSIS — K44.9 HIATAL HERNIA WITH GASTROESOPHAGEAL REFLUX: ICD-10-CM

## 2021-10-20 DIAGNOSIS — M81.0 AGE RELATED OSTEOPOROSIS, UNSPECIFIED PATHOLOGICAL FRACTURE PRESENCE: ICD-10-CM

## 2021-10-20 DIAGNOSIS — H66.002 ACUTE SUPPURATIVE OTITIS MEDIA OF LEFT EAR WITHOUT SPONTANEOUS RUPTURE OF TYMPANIC MEMBRANE, RECURRENCE NOT SPECIFIED: ICD-10-CM

## 2021-10-20 DIAGNOSIS — M79.672 LEFT FOOT PAIN: ICD-10-CM

## 2021-10-20 PROCEDURE — 1036F TOBACCO NON-USER: CPT | Performed by: FAMILY MEDICINE

## 2021-10-20 PROCEDURE — 99213 OFFICE O/P EST LOW 20 MIN: CPT | Performed by: FAMILY MEDICINE

## 2021-10-20 PROCEDURE — G8417 CALC BMI ABV UP PARAM F/U: HCPCS | Performed by: FAMILY MEDICINE

## 2021-10-20 PROCEDURE — G8484 FLU IMMUNIZE NO ADMIN: HCPCS | Performed by: FAMILY MEDICINE

## 2021-10-20 PROCEDURE — 3017F COLORECTAL CA SCREEN DOC REV: CPT | Performed by: FAMILY MEDICINE

## 2021-10-20 PROCEDURE — G8427 DOCREV CUR MEDS BY ELIG CLIN: HCPCS | Performed by: FAMILY MEDICINE

## 2021-10-20 RX ORDER — MONTELUKAST SODIUM 10 MG/1
TABLET ORAL
Qty: 90 TABLET | Refills: 1 | Status: SHIPPED
Start: 2021-10-20 | End: 2022-05-09 | Stop reason: SDUPTHER

## 2021-10-20 RX ORDER — IBUPROFEN 600 MG/1
600 TABLET ORAL 3 TIMES DAILY PRN
Qty: 270 TABLET | Refills: 1 | Status: SHIPPED
Start: 2021-10-20 | End: 2022-02-28

## 2021-10-20 RX ORDER — SUCRALFATE 1 G/1
1 TABLET ORAL 4 TIMES DAILY
Qty: 120 TABLET | Refills: 3 | Status: SHIPPED | OUTPATIENT
Start: 2021-10-20

## 2021-10-20 RX ORDER — METOCLOPRAMIDE 10 MG/1
TABLET ORAL
Qty: 90 TABLET | Refills: 1 | Status: SHIPPED
Start: 2021-10-20 | End: 2022-02-28

## 2021-10-20 RX ORDER — ACETAMINOPHEN 325 MG/1
650 TABLET ORAL EVERY 4 HOURS PRN
Qty: 1080 TABLET | Refills: 1 | Status: SHIPPED | OUTPATIENT
Start: 2021-10-20 | End: 2022-07-13

## 2021-10-26 PROBLEM — M25.552 LEFT HIP PAIN: Status: ACTIVE | Noted: 2021-10-26

## 2021-10-26 PROBLEM — M79.672 LEFT FOOT PAIN: Status: ACTIVE | Noted: 2021-10-26

## 2021-10-26 PROBLEM — M81.0 AGE RELATED OSTEOPOROSIS: Status: ACTIVE | Noted: 2021-10-26

## 2021-10-26 PROBLEM — M79.605 PAIN OF LEFT LOWER EXTREMITY: Status: ACTIVE | Noted: 2021-10-26

## 2021-10-26 PROBLEM — H66.002 ACUTE SUPPURATIVE OTITIS MEDIA OF LEFT EAR WITHOUT SPONTANEOUS RUPTURE OF TYMPANIC MEMBRANE: Status: ACTIVE | Noted: 2021-10-26

## 2021-10-26 ASSESSMENT — ENCOUNTER SYMPTOMS
RECTAL PAIN: 0
SORE THROAT: 0
VOICE CHANGE: 0
ABDOMINAL PAIN: 0
ANAL BLEEDING: 0
DIARRHEA: 0
COLOR CHANGE: 0
SHORTNESS OF BREATH: 0
EYE DISCHARGE: 0
SINUS PRESSURE: 0
PHOTOPHOBIA: 0
FACIAL SWELLING: 0
NAUSEA: 0
WHEEZING: 0
BLOOD IN STOOL: 0
CHEST TIGHTNESS: 0
SINUS PAIN: 0
EYE REDNESS: 0
EYE PAIN: 0
ABDOMINAL DISTENTION: 0
APNEA: 0
BACK PAIN: 1
TROUBLE SWALLOWING: 0
ALLERGIC/IMMUNOLOGIC NEGATIVE: 1
COUGH: 0
STRIDOR: 0
VOMITING: 0
CHOKING: 0
EYE ITCHING: 0
CONSTIPATION: 0
RHINORRHEA: 0
EYES NEGATIVE: 1

## 2021-10-27 NOTE — PROGRESS NOTES
Leanne Shelby is a 60 y.o. female. HPI/Chief C/O:  Chief Complaint   Patient presents with    Medication Refill     Pharmacy confirmed, meds pended    Gastroesophageal Reflux     Insurance will not cover protonix - would like alternative    Leg Pain     Pt c/o L leg pain, ongoing for months, also has swelling on her L foot     Allergies   Allergen Reactions    Penicillins Anaphylaxis    Dilaudid [Hydromorphone Hcl] Nausea And Vomiting    Demerol Hcl [Meperidine] Hives    Morphine      Can take alone, not with demerol    Sulfa Antibiotics Rash    Tape [place] [employer] Tape] Rash     This 60year old female presents for physical exam. Pt c/o left leg pain, and left foot swelling for months. Pt states she tripped and fell on left hip on 8/30/2021. Pt states the area is not warm. ROS:  Review of Systems   Constitutional: Positive for fatigue. Negative for activity change, appetite change, chills, diaphoresis, fever and unexpected weight change. HENT: Negative for congestion, dental problem, drooling, ear discharge, ear pain, facial swelling, hearing loss, mouth sores, nosebleeds, postnasal drip, rhinorrhea, sinus pressure, sinus pain, sneezing, sore throat, tinnitus, trouble swallowing and voice change. Eyes: Negative. Negative for photophobia, pain, discharge, redness, itching and visual disturbance. Respiratory: Negative for apnea, cough, choking, chest tightness, shortness of breath, wheezing and stridor. Cardiovascular: Negative. Negative for chest pain, palpitations and leg swelling. Gastrointestinal: Negative for abdominal distention, abdominal pain, anal bleeding, blood in stool, constipation, diarrhea, nausea, rectal pain and vomiting. Endocrine: Negative. Negative for cold intolerance, heat intolerance, polydipsia, polyphagia and polyuria.    Genitourinary: Negative for decreased urine volume, difficulty urinating, dyspareunia, dysuria, enuresis, flank pain, frequency, genital sores, hematuria, menstrual problem, pelvic pain and urgency. Musculoskeletal: Positive for arthralgias, back pain, myalgias and neck pain. Negative for gait problem, joint swelling and neck stiffness. Skin: Negative. Negative for color change, pallor, rash and wound. Allergic/Immunologic: Negative. Neurological: Positive for numbness (into her left leg). Negative for dizziness, tremors, seizures, syncope, facial asymmetry, speech difficulty, weakness, light-headedness and headaches. Hematological: Negative. Negative for adenopathy. Does not bruise/bleed easily. Psychiatric/Behavioral: Negative for agitation, behavioral problems, confusion, decreased concentration, dysphoric mood, hallucinations, self-injury, sleep disturbance and suicidal ideas. The patient is nervous/anxious. The patient is not hyperactive.          Past Medical/Surgical Hx;  Reviewed with patient      Diagnosis Date    Arthritis     knee    Asthma     Cancer ([facility] [address] 07/2007    ovarian    H/O cardiovascular stress test 06/13/2017    lexiscan    Hiatal hernia with gastroesophageal reflux     Hx of cardiovascular stress test 10/2012    lexiscan nuclear stress    Hyperlipidemia     Hypoactive thyroid     no meds    Osteoarthritis     PONV (postoperative nausea and vomiting)     Preoperative clearance 01/09/2017    Medical clearance from Dr. Lul Sorto in EPIC notes    Ulcer of gastroesophageal junction      Past Surgical History:   Procedure Laterality Date    CHOLECYSTECTOMY      COLONOSCOPY      FRACTURE SURGERY Right 2005    humerus, hardware in place   [address] [facility]  07/18/2007    and bso    JOINT REPLACEMENT Left 11/2018    KNEE ARTHROPLASTY Right 02/02/2017    KNEE ARTHROSCOPY  right    LUMBAR FUSION  05/27/2021    @ [facility] / Dr. Borges Patella  05/23/2014    bilateral lumbar laminectomy L4 and L5 excision of a spinal tumor  NERVE BLOCK N/A 05/15/2013    lumb ep #1    NERVE BLOCK N/A 2013    lumbar epidural nerve block #2    NERVE BLOCK  2013    lumbar epidural #3    TOTAL KNEE ARTHROPLASTY Right     TUBAL LIGATION      UPPER GASTROINTESTINAL ENDOSCOPY         Past Family Hx:  Reviewed with patient      Problem Relation Age of Onset    Emphysema Mother     Migraines Mother     Cancer Mother     Arthritis Other     Cancer Other     Heart Disease Other        Social Hx:  Reviewed with patient  Social History     Tobacco Use    Smoking status: Former Smoker     Packs/day: 0.50     Years: 1.00     Pack years: 0.50     Types: Cigarettes     Start date: 1977     Quit date: 1978     Years since quittin.2    Smokeless tobacco: Never Used    Tobacco comment: quit many years ago   Substance Use Topics    Alcohol use: Yes     Alcohol/week: 2.0 standard drinks     Types: 2 Glasses of wine per week     Comment: social       OBJECTIVE  /80   Pulse 79   Temp 98.4 °F (36.9 °C) (Temporal)   Resp 18   Ht 5' 5.5\" (1.664 m)   Wt 197 lb (89.4 kg)   LMP  (LMP Unknown)   SpO2 97%   Breastfeeding No   BMI 32.28 kg/m²     Problem List:  Alexia Side does not have any pertinent problems on file. PHYS EX:  Physical Exam  Vitals and nursing note reviewed. Constitutional:       General: She is not in acute distress. Appearance: Normal appearance. She is well-developed. She is obese. She is not ill-appearing, toxic-appearing or diaphoretic. Comments: Patient has morbid obesity. Patient instructed on low calorie, healthy diet. HENT:      Head: Normocephalic and atraumatic. Nose: Nose normal. No congestion or rhinorrhea. Eyes:      General: No scleral icterus. Right eye: No discharge. Left eye: No discharge. Extraocular Movements: Extraocular movements intact. Conjunctiva/sclera: Conjunctivae normal.      Pupils: Pupils are equal, round, and reactive to light. Neck:      Thyroid: No thyromegaly. Vascular: No carotid bruit or JVD. Trachea: No tracheal deviation. Cardiovascular:      Rate and Rhythm: Normal rate and regular rhythm. Pulses: Normal pulses. Heart sounds: Normal heart sounds. No murmur heard. No friction rub. No gallop. Pulmonary:      Effort: Pulmonary effort is normal. No respiratory distress. Breath sounds: Normal breath sounds. No stridor. No wheezing, rhonchi or rales. Chest:      Chest wall: No tenderness. Abdominal:      General: Bowel sounds are normal. There is no distension. Palpations: Abdomen is soft. There is no mass. Tenderness: There is no abdominal tenderness. There is no right CVA tenderness, left CVA tenderness, guarding or rebound. Hernia: No hernia is present. Musculoskeletal:         General: Tenderness present. No swelling, deformity or signs of injury. Cervical back: Normal range of motion and neck supple. No rigidity. No muscular tenderness. Lumbar back: Spasms, tenderness and bony tenderness present. No swelling, edema, deformity or lacerations. Decreased range of motion. Back:       Right knee: Bony tenderness present. No swelling, deformity, effusion, erythema, ecchymosis or lacerations. Decreased range of motion. Tenderness present over the patellar tendon. No medial joint line, lateral joint line, MCL or LCL tenderness. No LCL laxity or MCL laxity. Normal alignment, normal meniscus and normal patellar mobility. Right lower leg: No edema. Left lower leg: No edema. Legs:    Lymphadenopathy:      Cervical: No cervical adenopathy. Skin:     General: Skin is warm. Coloration: Skin is not jaundiced or pale. Findings: No bruising, erythema, lesion or rash. Neurological:      General: No focal deficit present. Mental Status: She is alert and oriented to person, place, and time. Cranial Nerves: No cranial nerve deficit. Sensory: Sensory deficit (into the left sciatic nerve) present. Motor: No weakness or abnormal muscle tone. Coordination: Coordination normal.      Gait: Gait normal.      Deep Tendon Reflexes: Reflexes are normal and symmetric. Reflexes normal.      Comments: Radicular pain into her feet   Psychiatric:         Mood and Affect: Mood normal.         Behavior: Behavior normal.         Thought Content: Thought content normal.         Judgment: Judgment normal.         ASSESSMENT/PLAN  Grecia was seen today for medication refill, gastroesophageal reflux and leg pain. Diagnoses and all orders for this visit:    Pain of left lower extremity  -     US DUP LOWER EXTREMITY LEFT DOUG; Future  Pt instructed on the importance of getting the doppler ASAP. DDD (degenerative disc disease), lumbar  -     ibuprofen (ADVIL;MOTRIN) 600 MG tablet; Take 1 tablet by mouth 3 times daily as needed for Pain  -     acetaminophen (TYLENOL) 325 MG tablet; Take 2 tablets by mouth every 4 hours as needed for Pain    Hiatal hernia with gastroesophageal reflux  -     metoclopramide (REGLAN) 10 MG tablet; TAKE 1 TABLET BY MOUTH EVERY DAY  -     sucralfate (CARAFATE) 1 GM tablet; Take 1 tablet by mouth 4 times daily    Acute suppurative otitis media of left ear without spontaneous rupture of tympanic membrane, recurrence not specified  -     montelukast (SINGULAIR) 10 MG tablet; TAKE 1 TABLET BY MOUTH EVERY DAY    Left foot pain  -     XR FOOT LEFT (MIN 3 VIEWS); Future    Left hip pain  -     XR HIP LEFT (2-3 VIEWS); Future    Age related osteoporosis, unspecified pathological fracture presence  -     DEXA BONE DENSITY AXIAL SKELETON; Future    Pt instructed if any worse go ED ASAP.     Outpatient Encounter Medications as of 10/20/2021   Medication Sig Dispense Refill    ibuprofen (ADVIL;MOTRIN) 600 MG tablet Take 1 tablet by mouth 3 times daily as needed for Pain 270 tablet 1    acetaminophen (TYLENOL) 325 MG tablet Take 2 tablets by mouth every 4 hours as needed for Pain 1080 tablet 1    metoclopramide (REGLAN) 10 MG tablet TAKE 1 TABLET BY MOUTH EVERY DAY 90 tablet 1    montelukast (SINGULAIR) 10 MG tablet TAKE 1 TABLET BY MOUTH EVERY DAY 90 tablet 1    sucralfate (CARAFATE) 1 GM tablet Take 1 tablet by mouth 4 times daily 120 tablet 3    rosuvastatin (CRESTOR) 5 MG tablet TAKE 1 TABLET BY MOUTH EVERY DAY 90 tablet 0    traMADol (ULTRAM) 50 MG tablet Take 1 tablet by mouth every 6 hours as needed for Pain for up to 30 days. Intended supply: 30 days. Take lowest dose possible to manage pain 120 tablet 0    Menthol, Topical Analgesic, (BIOFREEZE EX) Apply 1 each topically 2 times daily as needed (Joint Pain)       aspirin 81 MG tablet Take 81 mg by mouth 2 times daily      docusate sodium (DULCOLAX) 100 MG capsule Take 1 capsule by mouth 3 times daily as needed. 270 capsule 1    [DISCONTINUED] montelukast (SINGULAIR) 10 MG tablet TAKE 1 TABLET BY MOUTH EVERY DAY 90 tablet 0    [DISCONTINUED] acetaminophen (TYLENOL) 325 MG tablet Take 2 tablets by mouth every 4 hours as needed for Pain 1080 tablet 1    [DISCONTINUED] ibuprofen (ADVIL;MOTRIN) 600 MG tablet Take 1 tablet by mouth 3 times daily as needed for Pain 270 tablet 1    [DISCONTINUED] pantoprazole (PROTONIX) 40 MG tablet TAKE 1 TABLET BY MOUTH EVERY DAY 90 tablet 1    [DISCONTINUED] metoclopramide (REGLAN) 10 MG tablet TAKE 1 TABLET BY MOUTH EVERY DAY 90 tablet 1     No facility-administered encounter medications on file as of 10/20/2021. Return for after tests.         Reviewed recent labs related to Grecia's current problems      Discussed importance of regular Health Maintenance follow up  Health Maintenance   Topic    Colon cancer screen colonoscopy     Shingles Vaccine (1 of 2)    DTaP/Tdap/Td vaccine (2 - Td or Tdap)    Flu vaccine (1)    Lipid screen     TSH testing     Breast cancer screen     Pneumococcal 0-64 years Vaccine (2 of 2 - PPSV23)   

## 2021-11-22 ENCOUNTER — HOSPITAL ENCOUNTER (OUTPATIENT)
Dept: MAMMOGRAPHY | Age: 56
Discharge: HOME | End: 2021-11-24
Payer: COMMERCIAL

## 2021-11-22 ENCOUNTER — HOSPITAL ENCOUNTER (OUTPATIENT)
Dept: ULTRASOUND IMAGING | Age: 56
Discharge: HOME | End: 2021-11-22
Payer: COMMERCIAL

## 2021-11-22 ENCOUNTER — HOSPITAL ENCOUNTER (OUTPATIENT)
Dept: GENERAL RADIOLOGY | Age: 56
Discharge: HOME | End: 2021-11-24
Payer: COMMERCIAL

## 2021-11-22 ENCOUNTER — HOSPITAL ENCOUNTER (OUTPATIENT)
Age: 56
Discharge: HOME | End: 2021-11-24
Payer: COMMERCIAL

## 2021-11-22 DIAGNOSIS — M25.552 LEFT HIP PAIN: ICD-10-CM

## 2021-11-22 DIAGNOSIS — M81.0 AGE RELATED OSTEOPOROSIS, UNSPECIFIED PATHOLOGICAL FRACTURE PRESENCE: ICD-10-CM

## 2021-11-22 DIAGNOSIS — M79.672 LEFT FOOT PAIN: ICD-10-CM

## 2021-11-22 DIAGNOSIS — M79.605 PAIN OF LEFT LOWER EXTREMITY: ICD-10-CM

## 2021-11-22 PROCEDURE — 73630 X-RAY EXAM OF FOOT: CPT

## 2021-11-22 PROCEDURE — 73502 X-RAY EXAM HIP UNI 2-3 VIEWS: CPT

## 2021-11-22 PROCEDURE — 93971 EXTREMITY STUDY: CPT

## 2021-11-22 PROCEDURE — 77080 DXA BONE DENSITY AXIAL: CPT

## 2021-11-23 DIAGNOSIS — M77.32 HEEL SPUR, LEFT: Primary | ICD-10-CM

## 2022-02-26 DIAGNOSIS — K44.9 HIATAL HERNIA WITH GASTROESOPHAGEAL REFLUX: ICD-10-CM

## 2022-02-26 DIAGNOSIS — K21.9 HIATAL HERNIA WITH GASTROESOPHAGEAL REFLUX: ICD-10-CM

## 2022-02-26 DIAGNOSIS — M51.36 DDD (DEGENERATIVE DISC DISEASE), LUMBAR: ICD-10-CM

## 2022-02-28 RX ORDER — METOCLOPRAMIDE 10 MG/1
TABLET ORAL
Qty: 90 TABLET | Refills: 0 | Status: SHIPPED
Start: 2022-02-28 | End: 2022-04-06

## 2022-02-28 RX ORDER — IBUPROFEN 600 MG/1
TABLET ORAL
Qty: 270 TABLET | Refills: 0 | Status: SHIPPED
Start: 2022-02-28 | End: 2022-04-06

## 2022-04-05 DIAGNOSIS — K21.9 HIATAL HERNIA WITH GASTROESOPHAGEAL REFLUX: ICD-10-CM

## 2022-04-05 DIAGNOSIS — K44.9 HIATAL HERNIA WITH GASTROESOPHAGEAL REFLUX: ICD-10-CM

## 2022-04-05 DIAGNOSIS — M51.36 DDD (DEGENERATIVE DISC DISEASE), LUMBAR: ICD-10-CM

## 2022-04-06 RX ORDER — IBUPROFEN 600 MG/1
TABLET ORAL
Qty: 270 TABLET | Refills: 0 | Status: SHIPPED
Start: 2022-04-06 | End: 2022-08-16

## 2022-04-06 RX ORDER — METOCLOPRAMIDE 10 MG/1
TABLET ORAL
Qty: 90 TABLET | Refills: 0 | Status: SHIPPED
Start: 2022-04-06 | End: 2022-08-16

## 2022-04-22 ENCOUNTER — OFFICE VISIT (OUTPATIENT)
Dept: FAMILY MEDICINE CLINIC | Age: 57
End: 2022-04-22
Payer: COMMERCIAL

## 2022-04-22 VITALS
SYSTOLIC BLOOD PRESSURE: 118 MMHG | TEMPERATURE: 98 F | OXYGEN SATURATION: 98 % | HEIGHT: 65 IN | HEART RATE: 86 BPM | WEIGHT: 183.4 LBS | BODY MASS INDEX: 30.56 KG/M2 | DIASTOLIC BLOOD PRESSURE: 74 MMHG

## 2022-04-22 DIAGNOSIS — H66.002 ACUTE SUPPURATIVE OTITIS MEDIA OF LEFT EAR WITHOUT SPONTANEOUS RUPTURE OF TYMPANIC MEMBRANE, RECURRENCE NOT SPECIFIED: Primary | ICD-10-CM

## 2022-04-22 DIAGNOSIS — R42 VERTIGO: ICD-10-CM

## 2022-04-22 PROCEDURE — G8417 CALC BMI ABV UP PARAM F/U: HCPCS | Performed by: FAMILY MEDICINE

## 2022-04-22 PROCEDURE — G8427 DOCREV CUR MEDS BY ELIG CLIN: HCPCS | Performed by: FAMILY MEDICINE

## 2022-04-22 PROCEDURE — 96372 THER/PROPH/DIAG INJ SC/IM: CPT | Performed by: FAMILY MEDICINE

## 2022-04-22 PROCEDURE — 99213 OFFICE O/P EST LOW 20 MIN: CPT | Performed by: FAMILY MEDICINE

## 2022-04-22 PROCEDURE — 3017F COLORECTAL CA SCREEN DOC REV: CPT | Performed by: FAMILY MEDICINE

## 2022-04-22 PROCEDURE — 1036F TOBACCO NON-USER: CPT | Performed by: FAMILY MEDICINE

## 2022-04-22 RX ORDER — DEXAMETHASONE SODIUM PHOSPHATE 4 MG/ML
4 INJECTION, SOLUTION INTRA-ARTICULAR; INTRALESIONAL; INTRAMUSCULAR; INTRAVENOUS; SOFT TISSUE ONCE
Status: COMPLETED | OUTPATIENT
Start: 2022-04-22 | End: 2022-04-22

## 2022-04-22 RX ORDER — METHYLPREDNISOLONE 4 MG/1
TABLET ORAL
Qty: 1 KIT | Refills: 0 | Status: SHIPPED | OUTPATIENT
Start: 2022-04-22 | End: 2022-04-28

## 2022-04-22 RX ORDER — AZITHROMYCIN 250 MG/1
250 TABLET, FILM COATED ORAL SEE ADMIN INSTRUCTIONS
Qty: 6 TABLET | Refills: 0 | Status: SHIPPED | OUTPATIENT
Start: 2022-04-22 | End: 2022-04-27

## 2022-04-22 RX ADMIN — DEXAMETHASONE SODIUM PHOSPHATE 4 MG: 4 INJECTION, SOLUTION INTRA-ARTICULAR; INTRALESIONAL; INTRAMUSCULAR; INTRAVENOUS; SOFT TISSUE at 15:54

## 2022-04-25 PROBLEM — R42 VERTIGO: Status: ACTIVE | Noted: 2022-04-25

## 2022-04-25 ASSESSMENT — ENCOUNTER SYMPTOMS
TROUBLE SWALLOWING: 0
ALLERGIC/IMMUNOLOGIC NEGATIVE: 1
FACIAL SWELLING: 0
VOMITING: 0
RHINORRHEA: 0
CHOKING: 0
DIARRHEA: 0
WHEEZING: 0
BLOOD IN STOOL: 0
SINUS PRESSURE: 0
EYES NEGATIVE: 1
STRIDOR: 0
PHOTOPHOBIA: 0
CONSTIPATION: 0
ANAL BLEEDING: 0
CHEST TIGHTNESS: 0
COLOR CHANGE: 0
EYE DISCHARGE: 0
RECTAL PAIN: 0
EYE REDNESS: 0
VOICE CHANGE: 0
EYE PAIN: 0
SORE THROAT: 0
SHORTNESS OF BREATH: 0
APNEA: 0
EYE ITCHING: 0
NAUSEA: 0
BACK PAIN: 1
COUGH: 0
ABDOMINAL PAIN: 0
ABDOMINAL DISTENTION: 0
SINUS PAIN: 0

## 2022-04-25 NOTE — PROGRESS NOTES
Josh Jean-Baptiste is a 59 y.o. female. HPI/Chief C/O:  Chief Complaint   Patient presents with   Clevester Picking     left ear pain and left eye pain stabbing pain    Dizziness     having dizzy spells unrelated to ear pain     Allergies   Allergen Reactions    Penicillins Anaphylaxis    Dilaudid [Hydromorphone Hcl] Nausea And Vomiting    Demerol Hcl [Meperidine] Hives    Morphine      Can take alone, not with demerol    Sulfa Antibiotics Rash    Tape Lindia Cluck Tape] Rash     This 59year old female presents with left ear pain with vertigo, left eye pain, and sharp pain around left ear. Pt has nasal congestion, and enlarged lymph node. Pt has eye exam set up 5/5/2022   ROS:  Review of Systems   Constitutional: Positive for fatigue. Negative for activity change, appetite change, chills, diaphoresis, fever and unexpected weight change. HENT: Positive for congestion and ear pain. Negative for dental problem, drooling, ear discharge, facial swelling, hearing loss, mouth sores, nosebleeds, postnasal drip, rhinorrhea, sinus pressure, sinus pain, sneezing, sore throat, tinnitus, trouble swallowing and voice change. Eyes: Negative. Negative for photophobia, pain, discharge, redness, itching and visual disturbance. Respiratory: Negative for apnea, cough, choking, chest tightness, shortness of breath, wheezing and stridor. Cardiovascular: Negative. Negative for chest pain, palpitations and leg swelling. Gastrointestinal: Negative for abdominal distention, abdominal pain, anal bleeding, blood in stool, constipation, diarrhea, nausea, rectal pain and vomiting. Endocrine: Negative. Negative for cold intolerance, heat intolerance, polydipsia, polyphagia and polyuria. Genitourinary: Negative for decreased urine volume, difficulty urinating, dyspareunia, dysuria, enuresis, flank pain, frequency, genital sores, hematuria, menstrual problem, pelvic pain and urgency.    Musculoskeletal: Positive for arthralgias, back pain, myalgias and neck pain. Negative for gait problem, joint swelling and neck stiffness. Skin: Negative. Negative for color change, pallor, rash and wound. Allergic/Immunologic: Negative. Neurological: Positive for dizziness. Negative for tremors, seizures, syncope, facial asymmetry, speech difficulty, weakness, light-headedness, numbness (into her left leg) and headaches. Hematological: Negative. Negative for adenopathy. Does not bruise/bleed easily. Psychiatric/Behavioral: Negative for agitation, behavioral problems, confusion, decreased concentration, dysphoric mood, hallucinations, self-injury, sleep disturbance and suicidal ideas. The patient is nervous/anxious. The patient is not hyperactive.          Past Medical/Surgical Hx;  Reviewed with patient      Diagnosis Date    Arthritis     knee    Asthma     Cancer ([address] 07/2007    ovarian    H/O cardiovascular stress test 06/13/2017    [facility]    Hiatal hernia with gastroesophageal reflux     Hx of cardiovascular stress test 10/2012    [facility] nuclear stress    Hyperlipidemia     Hypoactive thyroid     no meds    Osteoarthritis     PONV (postoperative nausea and vomiting)     Preoperative clearance 01/09/2017    Medical clearance from Dr. Alonso William in EPIC notes    Ulcer of gastroesophageal junction      Past Surgical History:   Procedure Laterality Date    CHOLECYSTECTOMY      COLONOSCOPY      FRACTURE SURGERY Right 2005    humerus, hardware in place   [address]  07/18/2007    and bso    JOINT REPLACEMENT Left 11/2018    KNEE ARTHROPLASTY Right 02/02/2017    KNEE ARTHROSCOPY  right    LUMBAR FUSION  05/27/2021    @ [facility] / Dr. Vivien Virk  05/23/2014    bilateral lumbar laminectomy L4 and L5 excision of a spinal tumor    NERVE BLOCK N/A 05/15/2013    lumb ep #1    NERVE BLOCK N/A 05/29/2013    lumbar epidural nerve block #2    NERVE BLOCK  2013    lumbar epidural #3    TOTAL KNEE ARTHROPLASTY Right     TUBAL LIGATION      UPPER GASTROINTESTINAL ENDOSCOPY         Past Family Hx:  Reviewed with patient      Problem Relation Age of Onset    Emphysema Mother     Migraines Mother     Cancer Mother     Arthritis Other     Cancer Other     Heart Disease Other        Social Hx:  Reviewed with patient  Social History     Tobacco Use    Smoking status: Former Smoker     Packs/day: 0.50     Years: 1.00     Pack years: 0.50     Types: Cigarettes     Start date: 1977     Quit date: 1978     Years since quittin.7    Smokeless tobacco: Never Used    Tobacco comment: quit many years ago   Substance Use Topics    Alcohol use: Yes     Alcohol/week: 2.0 standard drinks     Types: 2 Glasses of wine per week     Comment: social       OBJECTIVE  /74   Pulse 86   Temp 98 °F (36.7 °C)   Ht 5' 5\" (1.651 m)   Wt 183 lb 6.4 oz (83.2 kg)   LMP  (LMP Unknown)   SpO2 98%   BMI 30.52 kg/m²     Problem List:  Yolie Oliver does not have any pertinent problems on file. PHYS EX:  Physical Exam  Vitals and nursing note reviewed. Constitutional:       General: She is not in acute distress. Appearance: Normal appearance. She is well-developed. She is obese. She is not ill-appearing, toxic-appearing or diaphoretic. Comments: Patient has morbid obesity. Patient instructed on low calorie, healthy diet. HENT:      Head: Normocephalic and atraumatic. Right Ear: Tympanic membrane, ear canal and external ear normal. There is no impacted cerumen. Left Ear: There is no impacted cerumen. Ears:      Comments: Left ear- TM- erythema, effusion. Nose: Congestion present. No rhinorrhea. Eyes:      General: No scleral icterus. Right eye: No discharge. Left eye: No discharge. Extraocular Movements: Extraocular movements intact.       Conjunctiva/sclera: Conjunctivae normal. Pupils: Pupils are equal, round, and reactive to light. Neck:      Thyroid: No thyromegaly. Vascular: No carotid bruit or JVD. Trachea: No tracheal deviation. Cardiovascular:      Rate and Rhythm: Normal rate and regular rhythm. Pulses: Normal pulses. Heart sounds: Normal heart sounds. No murmur heard. No friction rub. No gallop. Pulmonary:      Effort: Pulmonary effort is normal. No respiratory distress. Breath sounds: Normal breath sounds. No stridor. No wheezing, rhonchi or rales. Chest:      Chest wall: No tenderness. Abdominal:      General: Bowel sounds are normal. There is no distension. Palpations: Abdomen is soft. There is no mass. Tenderness: There is no abdominal tenderness. There is no right CVA tenderness, left CVA tenderness, guarding or rebound. Hernia: No hernia is present. Musculoskeletal:         General: Tenderness present. No swelling, deformity or signs of injury. Cervical back: Normal range of motion and neck supple. No rigidity. No muscular tenderness. Lumbar back: Spasms, tenderness and bony tenderness present. No swelling, edema, deformity or lacerations. Decreased range of motion. Back:       Right knee: Bony tenderness present. No swelling, deformity, effusion, erythema, ecchymosis or lacerations. Decreased range of motion. Tenderness present over the patellar tendon. No medial joint line, lateral joint line, MCL or LCL tenderness. No LCL laxity or MCL laxity. Normal alignment, normal meniscus and normal patellar mobility. Right lower leg: No edema. Left lower leg: No edema. Legs:    Lymphadenopathy:      Cervical: No cervical adenopathy. Skin:     General: Skin is warm. Coloration: Skin is not jaundiced or pale. Findings: No bruising, erythema, lesion or rash. Neurological:      General: No focal deficit present. Mental Status: She is alert and oriented to person, place, and time. Cranial Nerves: No cranial nerve deficit. Sensory: Sensory deficit (into the left sciatic nerve) present. Motor: No weakness or abnormal muscle tone. Coordination: Coordination normal.      Gait: Gait normal.      Deep Tendon Reflexes: Reflexes are normal and symmetric. Reflexes normal.      Comments: Radicular pain into her feet   Psychiatric:         Mood and Affect: Mood normal.         Behavior: Behavior normal.         Thought Content: Thought content normal.         Judgment: Judgment normal.         ASSESSMENT/PLAN  Grecia was seen today for otalgia and dizziness. Diagnoses and all orders for this visit:    Acute suppurative otitis media of left ear without spontaneous rupture of tympanic membrane, recurrence not specified  -     azithromycin (ZITHROMAX) 250 MG tablet; Take 1 tablet by mouth See Admin Instructions for 5 days 500mg on day 1 followed by 250mg on days 2 - 5  -     methylPREDNISolone (MEDROL DOSEPACK) 4 MG tablet; Take as directed    Vertigo  -     methylPREDNISolone (MEDROL DOSEPACK) 4 MG tablet; Take as directed    Other orders  -     dexamethasone (DECADRON) injection 4 mg    Pt instructed if any worse go ED ASAP. Outpatient Encounter Medications as of 4/22/2022   Medication Sig Dispense Refill    azithromycin (ZITHROMAX) 250 MG tablet Take 1 tablet by mouth See Admin Instructions for 5 days 500mg on day 1 followed by 250mg on days 2 - 5 6 tablet 0    methylPREDNISolone (MEDROL DOSEPACK) 4 MG tablet Take as directed 1 kit 0    ibuprofen (ADVIL;MOTRIN) 600 MG tablet TAKE 1 TABLET BY MOUTH THREE TIMES A DAY AS NEEDED FOR PAIN 270 tablet 0    metoclopramide (REGLAN) 10 MG tablet TAKE 1 TABLET BY MOUTH EVERY DAY 90 tablet 0    traMADol (ULTRAM) 50 MG tablet Take 1 tablet by mouth every 6 hours as needed for Pain for up to 30 days. Intended supply: 30 days.  Take lowest dose possible to manage pain 120 tablet 0    acetaminophen (TYLENOL) 325 MG tablet Take 2 tablets by mouth every 4 hours as needed for Pain 1080 tablet 1    montelukast (SINGULAIR) 10 MG tablet TAKE 1 TABLET BY MOUTH EVERY DAY 90 tablet 1    sucralfate (CARAFATE) 1 GM tablet Take 1 tablet by mouth 4 times daily 120 tablet 3    rosuvastatin (CRESTOR) 5 MG tablet TAKE 1 TABLET BY MOUTH EVERY DAY 90 tablet 0    Menthol, Topical Analgesic, (BIOFREEZE EX) Apply 1 each topically 2 times daily as needed (Joint Pain)       aspirin 81 MG tablet Take 81 mg by mouth 2 times daily      docusate sodium (DULCOLAX) 100 MG capsule Take 1 capsule by mouth 3 times daily as needed. 270 capsule 1    [] dexamethasone (DECADRON) injection 4 mg        No facility-administered encounter medications on file as of 2022. Return if symptoms worsen or fail to improve.         Reviewed recent labs related to Grecia's current problems      Discussed importance of regular Health Maintenance follow up  Health Maintenance   Topic    Shingles Vaccine (1 of 2)    DTaP/Tdap/Td vaccine (2 - Td or Tdap)    Pneumococcal 0-64 years Vaccine (2 - PCV)    Lipids     Depression Screen     TSH     Breast cancer screen     Colorectal Cancer Screen     Flu vaccine     COVID-19 Vaccine     Hepatitis C screen     HIV screen     Hepatitis A vaccine     Hepatitis B vaccine     Hib vaccine     Meningococcal (ACWY) vaccine

## 2022-05-09 ENCOUNTER — OFFICE VISIT (OUTPATIENT)
Dept: FAMILY MEDICINE CLINIC | Age: 57
End: 2022-05-09
Payer: COMMERCIAL

## 2022-05-09 VITALS
HEIGHT: 65 IN | SYSTOLIC BLOOD PRESSURE: 112 MMHG | OXYGEN SATURATION: 97 % | DIASTOLIC BLOOD PRESSURE: 68 MMHG | HEART RATE: 86 BPM | WEIGHT: 184.6 LBS | TEMPERATURE: 97.3 F | BODY MASS INDEX: 30.75 KG/M2 | RESPIRATION RATE: 18 BRPM

## 2022-05-09 DIAGNOSIS — R60.9 FLUID RETENTION: Primary | ICD-10-CM

## 2022-05-09 DIAGNOSIS — H66.002 ACUTE SUPPURATIVE OTITIS MEDIA OF LEFT EAR WITHOUT SPONTANEOUS RUPTURE OF TYMPANIC MEMBRANE, RECURRENCE NOT SPECIFIED: ICD-10-CM

## 2022-05-09 DIAGNOSIS — E78.5 DYSLIPIDEMIA: ICD-10-CM

## 2022-05-09 DIAGNOSIS — R73.01 IFG (IMPAIRED FASTING GLUCOSE): ICD-10-CM

## 2022-05-09 DIAGNOSIS — R53.83 FATIGUE, UNSPECIFIED TYPE: ICD-10-CM

## 2022-05-09 DIAGNOSIS — I95.9 HYPOTENSION, UNSPECIFIED HYPOTENSION TYPE: ICD-10-CM

## 2022-05-09 DIAGNOSIS — R60.9 FLUID RETENTION: ICD-10-CM

## 2022-05-09 LAB
ALBUMIN SERPL-MCNC: 4.5 G/DL (ref 3.5–5.2)
ALP BLD-CCNC: 81 U/L (ref 35–104)
ALT SERPL-CCNC: 9 U/L (ref 0–32)
ANION GAP SERPL CALCULATED.3IONS-SCNC: 11 MMOL/L (ref 7–16)
AST SERPL-CCNC: 24 U/L (ref 0–31)
BASOPHILS ABSOLUTE: 0.01 E9/L (ref 0–0.2)
BASOPHILS RELATIVE PERCENT: 0.3 % (ref 0–2)
BILIRUB SERPL-MCNC: <0.2 MG/DL (ref 0–1.2)
BUN BLDV-MCNC: 16 MG/DL (ref 6–20)
CALCIUM SERPL-MCNC: 9.6 MG/DL (ref 8.6–10.2)
CHLORIDE BLD-SCNC: 101 MMOL/L (ref 98–107)
CHOLESTEROL, TOTAL: 216 MG/DL (ref 0–199)
CO2: 28 MMOL/L (ref 22–29)
CREAT SERPL-MCNC: 1.1 MG/DL (ref 0.5–1)
EOSINOPHILS ABSOLUTE: 0.14 E9/L (ref 0.05–0.5)
EOSINOPHILS RELATIVE PERCENT: 4.6 % (ref 0–6)
GFR AFRICAN AMERICAN: >60
GFR NON-AFRICAN AMERICAN: 51 ML/MIN/1.73
GLUCOSE BLD-MCNC: 79 MG/DL (ref 74–99)
HBA1C MFR BLD: 5.1 % (ref 4–5.6)
HCT VFR BLD CALC: 38.2 % (ref 34–48)
HDLC SERPL-MCNC: 58 MG/DL
HEMOGLOBIN: 12.1 G/DL (ref 11.5–15.5)
IMMATURE GRANULOCYTES #: 0.01 E9/L
IMMATURE GRANULOCYTES %: 0.3 % (ref 0–5)
LDL CHOLESTEROL CALCULATED: 128 MG/DL (ref 0–99)
LYMPHOCYTES ABSOLUTE: 0.92 E9/L (ref 1.5–4)
LYMPHOCYTES RELATIVE PERCENT: 30.2 % (ref 20–42)
MCH RBC QN AUTO: 30.3 PG (ref 26–35)
MCHC RBC AUTO-ENTMCNC: 31.7 % (ref 32–34.5)
MCV RBC AUTO: 95.5 FL (ref 80–99.9)
MONOCYTES ABSOLUTE: 0.31 E9/L (ref 0.1–0.95)
MONOCYTES RELATIVE PERCENT: 10.2 % (ref 2–12)
NEUTROPHILS ABSOLUTE: 1.66 E9/L (ref 1.8–7.3)
NEUTROPHILS RELATIVE PERCENT: 54.4 % (ref 43–80)
PDW BLD-RTO: 13.4 FL (ref 11.5–15)
PLATELET # BLD: 190 E9/L (ref 130–450)
PMV BLD AUTO: 12.1 FL (ref 7–12)
POTASSIUM SERPL-SCNC: 4.9 MMOL/L (ref 3.5–5)
PRO-BNP: 93 PG/ML (ref 0–125)
RBC # BLD: 4 E12/L (ref 3.5–5.5)
SODIUM BLD-SCNC: 140 MMOL/L (ref 132–146)
TOTAL PROTEIN: 7.3 G/DL (ref 6.4–8.3)
TRIGL SERPL-MCNC: 152 MG/DL (ref 0–149)
TSH SERPL DL<=0.05 MIU/L-ACNC: 1.54 UIU/ML (ref 0.27–4.2)
VLDLC SERPL CALC-MCNC: 30 MG/DL
WBC # BLD: 3.1 E9/L (ref 4.5–11.5)

## 2022-05-09 PROCEDURE — G8417 CALC BMI ABV UP PARAM F/U: HCPCS | Performed by: FAMILY MEDICINE

## 2022-05-09 PROCEDURE — 99213 OFFICE O/P EST LOW 20 MIN: CPT | Performed by: FAMILY MEDICINE

## 2022-05-09 PROCEDURE — 3017F COLORECTAL CA SCREEN DOC REV: CPT | Performed by: FAMILY MEDICINE

## 2022-05-09 PROCEDURE — G8427 DOCREV CUR MEDS BY ELIG CLIN: HCPCS | Performed by: FAMILY MEDICINE

## 2022-05-09 PROCEDURE — 1036F TOBACCO NON-USER: CPT | Performed by: FAMILY MEDICINE

## 2022-05-09 RX ORDER — HYDROCHLOROTHIAZIDE 12.5 MG/1
TABLET ORAL
Qty: 30 TABLET | Refills: 1 | Status: SHIPPED
Start: 2022-05-09 | End: 2022-06-03

## 2022-05-09 RX ORDER — ROSUVASTATIN CALCIUM 5 MG/1
TABLET, COATED ORAL
Qty: 90 TABLET | Refills: 0 | Status: SHIPPED
Start: 2022-05-09 | End: 2022-08-16

## 2022-05-09 RX ORDER — BLOOD PRESSURE TEST KIT
KIT MISCELLANEOUS
Qty: 1 KIT | Refills: 0 | Status: SHIPPED | OUTPATIENT
Start: 2022-05-09

## 2022-05-09 RX ORDER — MONTELUKAST SODIUM 10 MG/1
TABLET ORAL
Qty: 90 TABLET | Refills: 1 | Status: SHIPPED | OUTPATIENT
Start: 2022-05-09

## 2022-05-09 ASSESSMENT — PATIENT HEALTH QUESTIONNAIRE - PHQ9
SUM OF ALL RESPONSES TO PHQ9 QUESTIONS 1 & 2: 0
SUM OF ALL RESPONSES TO PHQ QUESTIONS 1-9: 0
1. LITTLE INTEREST OR PLEASURE IN DOING THINGS: 0
SUM OF ALL RESPONSES TO PHQ QUESTIONS 1-9: 0
SUM OF ALL RESPONSES TO PHQ QUESTIONS 1-9: 0
2. FEELING DOWN, DEPRESSED OR HOPELESS: 0
SUM OF ALL RESPONSES TO PHQ QUESTIONS 1-9: 0

## 2022-05-11 PROBLEM — R60.9 FLUID RETENTION: Status: ACTIVE | Noted: 2022-05-11

## 2022-05-11 PROBLEM — I95.9 HYPOTENSION: Status: ACTIVE | Noted: 2022-05-11

## 2022-05-11 PROBLEM — E78.5 DYSLIPIDEMIA: Status: ACTIVE | Noted: 2022-05-11

## 2022-05-11 ASSESSMENT — ENCOUNTER SYMPTOMS
RHINORRHEA: 0
CHEST TIGHTNESS: 0
STRIDOR: 0
DIARRHEA: 0
SINUS PRESSURE: 0
ANAL BLEEDING: 0
WHEEZING: 0
COLOR CHANGE: 0
ABDOMINAL DISTENTION: 0
EYE REDNESS: 0
EYE PAIN: 0
BACK PAIN: 1
CONSTIPATION: 0
ALLERGIC/IMMUNOLOGIC NEGATIVE: 1
APNEA: 0
EYE DISCHARGE: 0
VOICE CHANGE: 0
NAUSEA: 0
FACIAL SWELLING: 0
COUGH: 0
SHORTNESS OF BREATH: 0
SORE THROAT: 0
VOMITING: 0
SINUS PAIN: 0
ABDOMINAL PAIN: 0
PHOTOPHOBIA: 0
CHOKING: 0
EYES NEGATIVE: 1
BLOOD IN STOOL: 0
RECTAL PAIN: 0
TROUBLE SWALLOWING: 0
EYE ITCHING: 0

## 2022-05-11 NOTE — PROGRESS NOTES
Christina Santana is a 59 y.o. female. HPI/Chief C/O:  Chief Complaint   Patient presents with    Swelling     eye doctor told her she is retaining fluid, swelling in left leg, hand, and ankle    Ear Fullness     would like you to check ears, left ear feels like there is fluid in it    Fatigue     Allergies   Allergen Reactions    Penicillins Anaphylaxis    Dilaudid [Hydromorphone Hcl] Nausea And Vomiting    Demerol Hcl [Meperidine] Hives    Morphine      Can take alone, not with demerol    Sulfa Antibiotics Rash    Tape Cely Blonder Tape] Rash     This 59year old female presents for recheck on left ear. Pt states ophthalmology states pt is filling up with fluid. Pt states she has fluid in left leg and hands. ROS:  Review of Systems   Constitutional: Positive for fatigue. Negative for activity change, appetite change, chills, diaphoresis, fever and unexpected weight change. HENT: Negative for congestion, dental problem, drooling, ear discharge, ear pain, facial swelling, hearing loss, mouth sores, nosebleeds, postnasal drip, rhinorrhea, sinus pressure, sinus pain, sneezing, sore throat, tinnitus, trouble swallowing and voice change. Eyes: Negative. Negative for photophobia, pain, discharge, redness, itching and visual disturbance. Respiratory: Negative for apnea, cough, choking, chest tightness, shortness of breath, wheezing and stridor. Cardiovascular: Positive for leg swelling. Negative for chest pain and palpitations. Gastrointestinal: Negative for abdominal distention, abdominal pain, anal bleeding, blood in stool, constipation, diarrhea, nausea, rectal pain and vomiting. Endocrine: Negative. Negative for cold intolerance, heat intolerance, polydipsia, polyphagia and polyuria.    Genitourinary: Negative for decreased urine volume, difficulty urinating, dyspareunia, dysuria, enuresis, flank pain, frequency, genital sores, hematuria, menstrual problem, pelvic pain and urgency. Musculoskeletal: Positive for arthralgias, back pain, myalgias and neck pain. Negative for gait problem, joint swelling and neck stiffness. Skin: Negative. Negative for color change, pallor, rash and wound. Allergic/Immunologic: Negative. Neurological: Negative for dizziness, tremors, seizures, syncope, facial asymmetry, speech difficulty, weakness, light-headedness, numbness (into her left leg) and headaches. Hematological: Negative. Negative for adenopathy. Does not bruise/bleed easily. Psychiatric/Behavioral: Negative for agitation, behavioral problems, confusion, decreased concentration, dysphoric mood, hallucinations, self-injury, sleep disturbance and suicidal ideas. The patient is nervous/anxious. The patient is not hyperactive.          Past Medical/Surgical Hx;  Reviewed with patient      Diagnosis Date    Arthritis     knee    Asthma     [address] [address] 07/2007    ovarian    H/O cardiovascular stress test 06/13/2017    [facility]    Hiatal hernia with gastroesophageal reflux     Hx of cardiovascular stress test 10/2012    [facility] nuclear stress    Hyperlipidemia     Hypoactive thyroid     no meds    Osteoarthritis     PONV (postoperative nausea and vomiting)     Preoperative clearance 01/09/2017    Medical clearance from Dr. Kamala Bañuelos in EPIC notes    Ulcer of gastroesophageal junction      Past Surgical History:   Procedure Laterality Date    CHOLECYSTECTOMY      COLONOSCOPY      FRACTURE SURGERY Right 2005    humerus, hardware in place   [address]  07/18/2007    and bso    JOINT REPLACEMENT Left 11/2018    KNEE ARTHROPLASTY Right 02/02/2017    KNEE ARTHROSCOPY  right    LUMBAR FUSION  05/27/2021    @ [facility] / Dr. Meron Wyman  05/23/2014    bilateral lumbar laminectomy L4 and L5 excision of a spinal tumor    NERVE BLOCK N/A 05/15/2013    lumb ep #1    NERVE BLOCK N/A 2013    lumbar epidural nerve block #2    NERVE BLOCK  2013    lumbar epidural #3    TOTAL KNEE ARTHROPLASTY Right     TUBAL LIGATION      UPPER GASTROINTESTINAL ENDOSCOPY         Past Family Hx:  Reviewed with patient      Problem Relation Age of Onset    Emphysema Mother     Migraines Mother     Cancer Mother     Arthritis Other     Cancer Other     Heart Disease Other        Social Hx:  Reviewed with patient  Social History     Tobacco Use    Smoking status: Former Smoker     Packs/day: 0.50     Years: 1.00     Pack years: 0.50     Types: Cigarettes     Start date: 1977     Quit date: 1978     Years since quittin.7    Smokeless tobacco: Never Used    Tobacco comment: quit many years ago   Substance Use Topics    Alcohol use: Yes     Alcohol/week: 2.0 standard drinks     Types: 2 Glasses of wine per week     Comment: social       OBJECTIVE  /68   Pulse 86   Temp 97.3 °F (36.3 °C)   Resp 18   Ht 5' 5\" (1.651 m)   Wt 184 lb 9.6 oz (83.7 kg)   LMP  (LMP Unknown)   SpO2 97%   BMI 30.72 kg/m²     Problem List:  Thierry Rodrigues does not have any pertinent problems on file. PHYS EX:  Physical Exam  Vitals and nursing note reviewed. Constitutional:       General: She is not in acute distress. Appearance: Normal appearance. She is well-developed. She is obese. She is not ill-appearing, toxic-appearing or diaphoretic. Comments: Patient has morbid obesity. Patient instructed on low calorie, healthy diet. HENT:      Head: Normocephalic and atraumatic. Right Ear: Tympanic membrane, ear canal and external ear normal. There is no impacted cerumen. Left Ear: Tympanic membrane, ear canal and external ear normal. There is no impacted cerumen. Nose: No congestion or rhinorrhea. Eyes:      General: No scleral icterus. Right eye: No discharge. Left eye: No discharge. Extraocular Movements: Extraocular movements intact. Conjunctiva/sclera: Conjunctivae normal.      Pupils: Pupils are equal, round, and reactive to light. Neck:      Thyroid: No thyromegaly. Vascular: No carotid bruit or JVD. Trachea: No tracheal deviation. Cardiovascular:      Rate and Rhythm: Normal rate and regular rhythm. Pulses: Normal pulses. Heart sounds: Normal heart sounds. No murmur heard. No friction rub. No gallop. Pulmonary:      Effort: Pulmonary effort is normal. No respiratory distress. Breath sounds: Normal breath sounds. No stridor. No wheezing, rhonchi or rales. Chest:      Chest wall: No tenderness. Abdominal:      General: Bowel sounds are normal. There is no distension. Palpations: Abdomen is soft. There is no mass. Tenderness: There is no abdominal tenderness. There is no right CVA tenderness, left CVA tenderness, guarding or rebound. Hernia: No hernia is present. Musculoskeletal:         General: Tenderness present. No swelling, deformity or signs of injury. Cervical back: Normal range of motion and neck supple. No rigidity. No muscular tenderness. Lumbar back: Spasms, tenderness and bony tenderness present. No swelling, edema, deformity or lacerations. Decreased range of motion. Back:       Right knee: Bony tenderness present. No swelling, deformity, effusion, erythema, ecchymosis or lacerations. Decreased range of motion. Tenderness present over the patellar tendon. No medial joint line, lateral joint line, MCL or LCL tenderness. No LCL laxity or MCL laxity. Normal alignment, normal meniscus and normal patellar mobility. Right lower leg: No edema. Left lower leg: No edema. Legs:    Lymphadenopathy:      Cervical: No cervical adenopathy. Skin:     General: Skin is warm. Coloration: Skin is not jaundiced or pale. Findings: No bruising, erythema, lesion or rash. Neurological:      General: No focal deficit present.       Mental Status: She is alert and oriented to person, place, and time. Cranial Nerves: No cranial nerve deficit. Sensory: Sensory deficit (into the left sciatic nerve) present. Motor: No weakness or abnormal muscle tone. Coordination: Coordination normal.      Gait: Gait normal.      Deep Tendon Reflexes: Reflexes are normal and symmetric. Reflexes normal.      Comments: Radicular pain into her feet   Psychiatric:         Mood and Affect: Mood normal.         Behavior: Behavior normal.         Thought Content: Thought content normal.         Judgment: Judgment normal.         ASSESSMENT/PLAN  Grecia was seen today for swelling, ear fullness and fatigue. Diagnoses and all orders for this visit:    Fluid retention  -     hydroCHLOROthiazide (HYDRODIURIL) 12.5 MG tablet; Take 1/2 tablet daily  -     CBC with Auto Differential; Future  -     Comprehensive Metabolic Panel; Future  -     Brain Natriuretic Peptide; Future  -     XR CHEST (2 VW); Future    Acute suppurative otitis media of left ear without spontaneous rupture of tympanic membrane, recurrence not specified  -     montelukast (SINGULAIR) 10 MG tablet; TAKE 1 TABLET BY MOUTH EVERY DAY  -     CBC with Auto Differential; Future  -     Comprehensive Metabolic Panel; Future    Dyslipidemia  -     rosuvastatin (CRESTOR) 5 MG tablet; Take one tablet daily  -     CBC with Auto Differential; Future  -     Comprehensive Metabolic Panel; Future  -     Lipid Panel; Future    IFG (impaired fasting glucose)  -     CBC with Auto Differential; Future  -     Comprehensive Metabolic Panel; Future  -     Hemoglobin A1C; Future    Fatigue, unspecified type  -     CBC with Auto Differential; Future  -     Comprehensive Metabolic Panel; Future  -     TSH; Future    Hypotension, unspecified hypotension type  -     Blood Pressure KIT; Check pressure twice daily and keep log of readings    Pt instructed if any worse go ED ASAP.     Outpatient Encounter Medications as of 5/9/2022 Medication Sig Dispense Refill    montelukast (SINGULAIR) 10 MG tablet TAKE 1 TABLET BY MOUTH EVERY DAY 90 tablet 1    rosuvastatin (CRESTOR) 5 MG tablet Take one tablet daily 90 tablet 0    hydroCHLOROthiazide (HYDRODIURIL) 12.5 MG tablet Take 1/2 tablet daily 30 tablet 1    Blood Pressure KIT Check pressure twice daily and keep log of readings 1 kit 0    [START ON 5/12/2022] traMADol (ULTRAM) 50 MG tablet Take 1 tablet by mouth every 6 hours as needed for Pain for up to 30 days. Intended supply: 30 days. Take lowest dose possible to manage pain 120 tablet 0    ibuprofen (ADVIL;MOTRIN) 600 MG tablet TAKE 1 TABLET BY MOUTH THREE TIMES A DAY AS NEEDED FOR PAIN 270 tablet 0    metoclopramide (REGLAN) 10 MG tablet TAKE 1 TABLET BY MOUTH EVERY DAY 90 tablet 0    acetaminophen (TYLENOL) 325 MG tablet Take 2 tablets by mouth every 4 hours as needed for Pain (Patient taking differently: Take 250 mg by mouth every 4 hours as needed for Pain ) 1080 tablet 1    sucralfate (CARAFATE) 1 GM tablet Take 1 tablet by mouth 4 times daily (Patient taking differently: Take 1 g by mouth every morning ) 120 tablet 3    Menthol, Topical Analgesic, (BIOFREEZE EX) Apply 1 each topically 2 times daily as needed (Joint Pain)       aspirin 81 MG tablet Take 81 mg by mouth 2 times daily      docusate sodium (DULCOLAX) 100 MG capsule Take 1 capsule by mouth 3 times daily as needed. 270 capsule 1    [DISCONTINUED] montelukast (SINGULAIR) 10 MG tablet TAKE 1 TABLET BY MOUTH EVERY DAY 90 tablet 1    [DISCONTINUED] rosuvastatin (CRESTOR) 5 MG tablet TAKE 1 TABLET BY MOUTH EVERY DAY 90 tablet 0     No facility-administered encounter medications on file as of 5/9/2022. Return in about 3 months (around 8/9/2022).         Reviewed recent labs related to Grecia's current problems      Discussed importance of regular Health Maintenance follow up  Health Maintenance   Topic    Shingles vaccine (1 of 2)    DTaP/Tdap/Td vaccine (2 - Td or Tdap)    Pneumococcal 0-64 years Vaccine (2 - PCV)    Breast cancer screen     Lipids     Depression Screen     Colorectal Cancer Screen     Flu vaccine     COVID-19 Vaccine     Hepatitis C screen     HIV screen     Hepatitis A vaccine     Hepatitis B vaccine     Hib vaccine     Meningococcal (ACWY) vaccine

## 2022-06-03 DIAGNOSIS — R60.9 FLUID RETENTION: ICD-10-CM

## 2022-06-03 RX ORDER — HYDROCHLOROTHIAZIDE 12.5 MG/1
TABLET ORAL
Qty: 15 TABLET | Refills: 3 | Status: SHIPPED
Start: 2022-06-03 | End: 2022-08-16

## 2022-06-28 ENCOUNTER — HOSPITAL ENCOUNTER (OUTPATIENT)
Dept: CT IMAGING | Age: 57
Discharge: HOME | End: 2022-06-28
Payer: COMMERCIAL

## 2022-06-28 DIAGNOSIS — G89.29 CHRONIC MIDLINE LOW BACK PAIN WITH LEFT-SIDED SCIATICA: ICD-10-CM

## 2022-06-28 DIAGNOSIS — M79.89 LEFT LEG SWELLING: ICD-10-CM

## 2022-06-28 DIAGNOSIS — M54.42 CHRONIC MIDLINE LOW BACK PAIN WITH LEFT-SIDED SCIATICA: ICD-10-CM

## 2022-06-28 PROCEDURE — 72131 CT LUMBAR SPINE W/O DYE: CPT

## 2022-08-15 ENCOUNTER — TELEPHONE (OUTPATIENT)
Dept: FAMILY MEDICINE CLINIC | Age: 57
End: 2022-08-15

## 2022-08-15 DIAGNOSIS — K44.9 HIATAL HERNIA WITH GASTROESOPHAGEAL REFLUX: ICD-10-CM

## 2022-08-15 DIAGNOSIS — M51.36 DDD (DEGENERATIVE DISC DISEASE), LUMBAR: ICD-10-CM

## 2022-08-15 DIAGNOSIS — E78.5 DYSLIPIDEMIA: ICD-10-CM

## 2022-08-15 DIAGNOSIS — R60.9 FLUID RETENTION: ICD-10-CM

## 2022-08-15 DIAGNOSIS — K21.9 HIATAL HERNIA WITH GASTROESOPHAGEAL REFLUX: ICD-10-CM

## 2022-08-15 NOTE — TELEPHONE ENCOUNTER
Patient called in c/o frequent urination vaginal pressure since Thursday. the patient is requesting antibiotic to be sent in. I advised patient  to utilize  our walk in care facility . Patient refused and states she just wants something called in to [facility] on [address] . Please advise.   Electronically signed by Dereck Haywood MA on 8/15/22 at 3:59 PM EDT

## 2022-08-16 RX ORDER — IBUPROFEN 600 MG/1
TABLET ORAL
Qty: 270 TABLET | Refills: 0 | Status: SHIPPED | OUTPATIENT
Start: 2022-08-16

## 2022-08-16 RX ORDER — METOCLOPRAMIDE 10 MG/1
TABLET ORAL
Qty: 90 TABLET | Refills: 0 | Status: SHIPPED | OUTPATIENT
Start: 2022-08-16

## 2022-08-16 RX ORDER — ROSUVASTATIN CALCIUM 5 MG/1
TABLET, COATED ORAL
Qty: 90 TABLET | Refills: 0 | Status: SHIPPED | OUTPATIENT
Start: 2022-08-16

## 2022-08-16 RX ORDER — HYDROCHLOROTHIAZIDE 12.5 MG/1
TABLET ORAL
Qty: 45 TABLET | Refills: 1 | Status: SHIPPED | OUTPATIENT
Start: 2022-08-16

## 2022-08-17 RX ORDER — NITROFURANTOIN 25; 75 MG/1; MG/1
100 CAPSULE ORAL 2 TIMES DAILY
Qty: 10 CAPSULE | Refills: 0 | Status: CANCELLED | OUTPATIENT
Start: 2022-08-17 | End: 2022-08-22

## 2022-08-17 NOTE — TELEPHONE ENCOUNTER
This MA spoke with pt - pt advised of results and recommendations per Dr. Luis Mckenzie. Pt verbalized she understood and is amendable to macrobid 100 mg  BID x 5 days. Med pended.

## 2022-11-11 DIAGNOSIS — K21.9 HIATAL HERNIA WITH GASTROESOPHAGEAL REFLUX: ICD-10-CM

## 2022-11-11 DIAGNOSIS — K44.9 HIATAL HERNIA WITH GASTROESOPHAGEAL REFLUX: ICD-10-CM

## 2022-11-11 RX ORDER — METOCLOPRAMIDE 10 MG/1
TABLET ORAL
Qty: 90 TABLET | Refills: 0 | Status: SHIPPED | OUTPATIENT
Start: 2022-11-11

## 2022-11-12 DIAGNOSIS — M51.36 DDD (DEGENERATIVE DISC DISEASE), LUMBAR: ICD-10-CM

## 2022-11-14 RX ORDER — IBUPROFEN 600 MG/1
TABLET ORAL
Qty: 270 TABLET | Refills: 0 | Status: SHIPPED | OUTPATIENT
Start: 2022-11-14

## 2023-01-23 DIAGNOSIS — E78.5 DYSLIPIDEMIA: ICD-10-CM

## 2023-01-23 RX ORDER — ROSUVASTATIN CALCIUM 5 MG/1
TABLET, COATED ORAL
Qty: 90 TABLET | Refills: 0 | Status: SHIPPED | OUTPATIENT
Start: 2023-01-23

## 2023-02-07 DIAGNOSIS — R60.9 FLUID RETENTION: ICD-10-CM

## 2023-02-07 NOTE — TELEPHONE ENCOUNTER
Last seen 5/9/2022  Next appt Visit date not found    This MA called and left message for pt to call the office to schedule an appointment for medication refills.      Electronically signed by Lars Morgan MA on 2/7/23 at 8:35 AM EST

## 2023-02-08 NOTE — TELEPHONE ENCOUNTER
2nd attempt, This MA called and left message for pt to call the office and schedule an appointment for medication refills.      Electronically signed by Mumtaz Snell MA on 2/8/23 at 8:32 AM EST

## 2023-02-09 RX ORDER — HYDROCHLOROTHIAZIDE 12.5 MG/1
TABLET ORAL
Qty: 45 TABLET | Refills: 1 | OUTPATIENT
Start: 2023-02-09

## 2023-02-09 NOTE — TELEPHONE ENCOUNTER
3rd attempt, This MA called and left a message for pt to call the office to schedule an appointment for medication refills. Unable to reach letter sent.     Electronically signed by Ynes Purdy MA on 2/9/23 at 8:23 AM EST

## 2023-02-15 DIAGNOSIS — M51.36 DDD (DEGENERATIVE DISC DISEASE), LUMBAR: ICD-10-CM

## 2023-02-15 RX ORDER — IBUPROFEN 600 MG/1
TABLET ORAL
Qty: 270 TABLET | Refills: 0 | OUTPATIENT
Start: 2023-02-15

## 2023-04-20 DIAGNOSIS — E78.5 DYSLIPIDEMIA: ICD-10-CM

## 2023-04-21 RX ORDER — ROSUVASTATIN CALCIUM 5 MG/1
5 TABLET, COATED ORAL DAILY
Qty: 30 TABLET | Refills: 0 | Status: SHIPPED | OUTPATIENT
Start: 2023-04-21 | End: 2023-05-21

## 2023-05-15 DIAGNOSIS — E78.5 DYSLIPIDEMIA: ICD-10-CM

## 2023-05-15 RX ORDER — ROSUVASTATIN CALCIUM 5 MG/1
TABLET, COATED ORAL
Qty: 30 TABLET | Refills: 0 | OUTPATIENT
Start: 2023-05-15

## 2023-08-25 LAB
BASOPHILS (10*3/UL) IN BLOOD BY AUTOMATED COUNT: 0.02 X10E9/L (ref 0–0.1)
BASOPHILS/100 LEUKOCYTES IN BLOOD BY AUTOMATED COUNT: 0.6 % (ref 0–2)
EOSINOPHILS (10*3/UL) IN BLOOD BY AUTOMATED COUNT: 0.07 X10E9/L (ref 0–0.7)
EOSINOPHILS/100 LEUKOCYTES IN BLOOD BY AUTOMATED COUNT: 2.3 % (ref 0–6)
ERYTHROCYTE DISTRIBUTION WIDTH (RATIO) BY AUTOMATED COUNT: 13.7 % (ref 11.5–14.5)
ERYTHROCYTE MEAN CORPUSCULAR HEMOGLOBIN CONCENTRATION (G/DL) BY AUTOMATED: 32.8 G/DL (ref 32–36)
ERYTHROCYTE MEAN CORPUSCULAR VOLUME (FL) BY AUTOMATED COUNT: 92 FL (ref 80–100)
ERYTHROCYTES (10*6/UL) IN BLOOD BY AUTOMATED COUNT: 4.09 X10E12/L (ref 4–5.2)
FERRITIN (UG/LL) IN SER/PLAS: 71 UG/L (ref 8–150)
HEMATOCRIT (%) IN BLOOD BY AUTOMATED COUNT: 37.5 % (ref 36–46)
HEMOGLOBIN (G/DL) IN BLOOD: 12.3 G/DL (ref 12–16)
IGA (MG/DL) IN SER/PLAS: 343 MG/DL (ref 70–400)
IGG (MG/DL) IN SER/PLAS: 1410 MG/DL (ref 700–1600)
IGM (MG/DL) IN SER/PLAS: 56 MG/DL (ref 40–230)
IMMATURE GRANULOCYTES/100 LEUKOCYTES IN BLOOD BY AUTOMATED COUNT: 0.3 % (ref 0–0.9)
IRON (UG/DL) IN SER/PLAS: 73 UG/DL (ref 35–150)
IRON BINDING CAPACITY (UG/DL) IN SER/PLAS: 335 UG/DL (ref 240–445)
IRON SATURATION (%) IN SER/PLAS: 22 % (ref 25–45)
LEUKOCYTES (10*3/UL) IN BLOOD BY AUTOMATED COUNT: 3.1 X10E9/L (ref 4.4–11.3)
LYMPHOCYTES (10*3/UL) IN BLOOD BY AUTOMATED COUNT: 1.11 X10E9/L (ref 1.2–4.8)
LYMPHOCYTES/100 LEUKOCYTES IN BLOOD BY AUTOMATED COUNT: 35.9 % (ref 13–44)
MONOCYTES (10*3/UL) IN BLOOD BY AUTOMATED COUNT: 0.34 X10E9/L (ref 0.1–1)
MONOCYTES/100 LEUKOCYTES IN BLOOD BY AUTOMATED COUNT: 11 % (ref 2–10)
NEUTROPHILS (10*3/UL) IN BLOOD BY AUTOMATED COUNT: 1.54 X10E9/L (ref 1.2–7.7)
NEUTROPHILS/100 LEUKOCYTES IN BLOOD BY AUTOMATED COUNT: 49.9 % (ref 40–80)
PLATELETS (10*3/UL) IN BLOOD AUTOMATED COUNT: 197 X10E9/L (ref 150–450)
SEDIMENTATION RATE, ERYTHROCYTE: 11 MM/H (ref 0–30)
THYROTROPIN (MIU/L) IN SER/PLAS BY DETECTION LIMIT <= 0.05 MIU/L: 1.62 MIU/L (ref 0.44–3.98)

## 2023-08-28 LAB
BASOPHILS (10*3/UL) IN BLOOD BY AUTOMATED COUNT: NORMAL
BASOPHILS (10*3/UL) IN BLOOD BY AUTOMATED COUNT: NORMAL
BASOPHILS/100 LEUKOCYTES IN BLOOD BY AUTOMATED COUNT: NORMAL
BASOPHILS/100 LEUKOCYTES IN BLOOD BY AUTOMATED COUNT: NORMAL
CD19 ABSOLUTE: 0.28 X10E9/L (ref 0.07–0.91)
CD19 ABSOLUTE: 0.28 X10E9/L (ref 0.07–0.91)
CD19%: 25 % (ref 6–19)
CD19%: 25 % (ref 6–19)
CD19+CD24++CD38++%: 3.2 % (ref 1–3.6)
CD19+CD27+IGD+%: 2.6 % (ref 13.4–21.4)
CD19+CD27+IGD-%: 3.3 % (ref 9.2–18.9)
CD19+CD27-IGD+%: 90.2 % (ref 58–72.1)
CD3 ABSOLUTE: 0.8 X10E9/L (ref 0.71–4.18)
CD3 ABSOLUTE: 0.8 X10E9/L (ref 0.71–4.18)
CD3%: 72 % (ref 59–87)
CD3%: 72 % (ref 59–87)
CD3+CD4+ ABSOLUTE: 0.56 X10E9/L (ref 0.35–2.74)
CD3+CD4+ ABSOLUTE: 0.59 X10E9/L (ref 0.35–2.74)
CD3+CD4-CD8-%: 1 % (ref 0–6)
CD3+CD45RA+CD45RO-%: 29.9 % (ref 12.1–50.7)
CD3+CD45RO+CD45RA-%: 61.8 % (ref 24.3–60.8)
CD3+CD8+ ABSOLUTE: 0.2 X10E9/L (ref 0.08–1.49)
CD3+CD8+ ABSOLUTE: 0.2 X10E9/L (ref 0.08–1.49)
CD3-CD16+CD56+ ABSOLUTE: 0.03 X10E9/L (ref 0–0.86)
CD3-CD16+CD56+%: 3 % (ref 0–18)
CD4+CD25+CD127-%: 5.7 % (ref 3.1–9.4)
CD4+CD27+CD45RO+%: 51.8 % (ref 31.7–75.2)
CD4+CD27+CD45RO-%: 34 % (ref 11.6–59.9)
CD4+CD27-CD45RO+%: 12.1 % (ref 0.2–15.3)
CD4/CD8 RATIO: 2.78 (ref 1–3.5)
CD4/CD8 RATIO: 2.94 (ref 1–3.5)
CD45%: 100 %
CD8+CD27+CD45RO+%: 26.9 % (ref 16.6–63.4)
CD8+CD27+CD45RO-%: 32.5 % (ref 10.6–59.8)
CD8+CD27-CD45RO+%: 19.7 % (ref 0–19.5)
CP CD3+CD4+%: 50 % (ref 29–57)
CP CD3+CD4+%: 53 % (ref 29–57)
CP CD3+CD8+%: 18 % (ref 7–31)
CP CD3+CD8+%: 18 % (ref 7–31)
EOSINOPHILS (10*3/UL) IN BLOOD BY AUTOMATED COUNT: NORMAL
EOSINOPHILS (10*3/UL) IN BLOOD BY AUTOMATED COUNT: NORMAL
EOSINOPHILS/100 LEUKOCYTES IN BLOOD BY AUTOMATED COUNT: NORMAL
EOSINOPHILS/100 LEUKOCYTES IN BLOOD BY AUTOMATED COUNT: NORMAL
ERYTHROCYTE DISTRIBUTION WIDTH (RATIO) BY AUTOMATED COUNT: NORMAL
ERYTHROCYTE DISTRIBUTION WIDTH (RATIO) BY AUTOMATED COUNT: NORMAL
ERYTHROCYTE MEAN CORPUSCULAR HEMOGLOBIN CONCENTRATION (G/DL) BY AUTOMATED: NORMAL
ERYTHROCYTE MEAN CORPUSCULAR HEMOGLOBIN CONCENTRATION (G/DL) BY AUTOMATED: NORMAL
ERYTHROCYTE MEAN CORPUSCULAR VOLUME (FL) BY AUTOMATED COUNT: NORMAL
ERYTHROCYTE MEAN CORPUSCULAR VOLUME (FL) BY AUTOMATED COUNT: NORMAL
ERYTHROCYTES (10*6/UL) IN BLOOD BY AUTOMATED COUNT: NORMAL
ERYTHROCYTES (10*6/UL) IN BLOOD BY AUTOMATED COUNT: NORMAL
FMETH: ABNORMAL
FMETH: NORMAL
FMETH: NORMAL
FSIT1: ABNORMAL
FSIT1: NORMAL
FSIT1: NORMAL
HEMATOCRIT (%) IN BLOOD BY AUTOMATED COUNT: NORMAL
HEMATOCRIT (%) IN BLOOD BY AUTOMATED COUNT: NORMAL
HEMOGLOBIN (G/DL) IN BLOOD: NORMAL
HEMOGLOBIN (G/DL) IN BLOOD: NORMAL
IMMATURE GRANULOCYTES/100 LEUKOCYTES IN BLOOD BY AUTOMATED COUNT: NORMAL
IMMATURE GRANULOCYTES/100 LEUKOCYTES IN BLOOD BY AUTOMATED COUNT: NORMAL
LEUKOCYTES (10*3/UL) IN BLOOD BY AUTOMATED COUNT: NORMAL
LEUKOCYTES (10*3/UL) IN BLOOD BY AUTOMATED COUNT: NORMAL
LYMPHOCYTES (10*3/UL) IN BLOOD BY AUTOMATED COUNT: NORMAL
LYMPHOCYTES (10*3/UL) IN BLOOD BY AUTOMATED COUNT: NORMAL
LYMPHOCYTES/100 LEUKOCYTES IN BLOOD BY AUTOMATED COUNT: NORMAL
LYMPHOCYTES/100 LEUKOCYTES IN BLOOD BY AUTOMATED COUNT: NORMAL
MANUAL DIFFERENTIAL Y/N: NORMAL
MANUAL DIFFERENTIAL Y/N: NORMAL
MARKER INTERPRETATION: ABNORMAL
MARKER INTERPRETATION: NORMAL
MONOCYTES (10*3/UL) IN BLOOD BY AUTOMATED COUNT: NORMAL
MONOCYTES (10*3/UL) IN BLOOD BY AUTOMATED COUNT: NORMAL
MONOCYTES/100 LEUKOCYTES IN BLOOD BY AUTOMATED COUNT: NORMAL
MONOCYTES/100 LEUKOCYTES IN BLOOD BY AUTOMATED COUNT: NORMAL
NEUTROPHILS (10*3/UL) IN BLOOD BY AUTOMATED COUNT: NORMAL
NEUTROPHILS (10*3/UL) IN BLOOD BY AUTOMATED COUNT: NORMAL
NEUTROPHILS/100 LEUKOCYTES IN BLOOD BY AUTOMATED COUNT: NORMAL
NEUTROPHILS/100 LEUKOCYTES IN BLOOD BY AUTOMATED COUNT: NORMAL
NRBC (PER 100 WBCS) BY AUTOMATED COUNT: NORMAL
NRBC (PER 100 WBCS) BY AUTOMATED COUNT: NORMAL
PLATELETS (10*3/UL) IN BLOOD AUTOMATED COUNT: NORMAL
PLATELETS (10*3/UL) IN BLOOD AUTOMATED COUNT: NORMAL
PV191: NORMAL

## 2023-08-30 LAB
PNEUMO SEROTYPE INTERPRETATION: NORMAL
SEROTYPE 1, VIRC: 5.7 UG/ML
SEROTYPE 12F, VIRC: 1.89 UG/ML
SEROTYPE 14, VIRC: 7.81 UG/ML
SEROTYPE 18C(56), VIRC: 4.03 UG/ML
SEROTYPE 19F, VIRC: 29.06 UG/ML
SEROTYPE 23F, VIRC: 0.46 UG/ML
SEROTYPE 3, VIRC: 1.71 UG/ML
SEROTYPE 4, VIRC: 2.94 UG/ML
SEROTYPE 5, VIRC: 6.98 UG/ML
SEROTYPE 6B(26), VIRC: 0.97 UG/ML
SEROTYPE 7F(51), VIRC: 3.74 UG/ML
SEROTYPE 8, VIRC: 7.87 UG/ML
SEROTYPE 9N, VIRC: 9.94 UG/ML
SEROTYPE 9V(68), VIRC: 5.19 UG/ML

## 2023-10-04 RX ORDER — OXYBUTYNIN CHLORIDE 5 MG/1
5 TABLET, EXTENDED RELEASE ORAL DAILY
Qty: 90 TABLET | OUTPATIENT
Start: 2023-10-04

## 2023-11-29 ENCOUNTER — OFFICE VISIT (OUTPATIENT)
Dept: ORTHOPEDIC SURGERY | Facility: CLINIC | Age: 58
End: 2023-11-29
Payer: COMMERCIAL

## 2023-11-29 DIAGNOSIS — M54.50 LOW BACK PAIN, UNSPECIFIED BACK PAIN LATERALITY, UNSPECIFIED CHRONICITY, UNSPECIFIED WHETHER SCIATICA PRESENT: ICD-10-CM

## 2023-11-29 DIAGNOSIS — M48.061 DEGENERATIVE LUMBAR SPINAL STENOSIS: Primary | ICD-10-CM

## 2023-11-29 PROCEDURE — 99214 OFFICE O/P EST MOD 30 MIN: CPT | Performed by: ORTHOPAEDIC SURGERY

## 2023-11-29 RX ORDER — MELOXICAM 15 MG/1
15 TABLET ORAL DAILY
Qty: 30 TABLET | Refills: 11 | Status: SHIPPED | OUTPATIENT
Start: 2023-11-29 | End: 2024-11-28

## 2023-11-29 NOTE — LETTER
November 29, 2023     Vamshi Espinoza DO  [address] [facility]  [facility] [address]    Patient: Ioana Mckeon   YOB: 1965   Date of Visit: 11/29/2023       Dear Dr. Vamshi Espinoza DO:    Thank you for referring Ioana Mckeon to me for evaluation. Below are my notes for this consultation.  If you have questions, please do not hesitate to call me. I look forward to following your patient along with you.       Sincerely,     Javed Ayers MD      CC: No Recipients  ______________________________________________________________________________________    Yesi is a pleasant 58-year-old woman who 3 years ago had a revision decompression and fusion at L4-5.  She did extremely well with this.  She works at "[employer] in the [place].  She had been able to return to full activity.  Her job description changed over the last year and she has been doing quite a bit of bending and lifting and twisting.  Over the last 6 months she has developed rather severe recurrent low back and radiating bilateral leg pain, right greater than left.    No treatment at this point.    She did see Dr. Guevara and they discussed different treatment options.    She continues to work.  However she does have significant back and leg pain with prolonged standing and walking.    Her general health has been excellent.    Family, social, and medical histories are obtained and reviewed.    30-point, patient-recorded Review of Systems is personally obtained and reviewed. Inclusive is no history of weight loss, change in appetite, recent change in activity level, change in bowel or bladder habits, fevers, chills, malaise, or night pain.    She is here today with her       On exam healthy-appearing woman no acute distress.  Slow stable gait.  Limited flexion.  Painless motion both hips.  Her strength is intact in the lower extremities without pathologic reflexes.    Her  lumbar MRI does show adjacent level stenosis at L3-4 that is fairly severe in part due to a central disc bulge but also significant facet hypertrophy with fluid within both facet joints.    Impression: She has developed recurrent lumbar radiculopathy that correlates with the adjacent level stenosis at L3-4.  Her symptoms have been present for at least 6 months but he really has not had any nonsurgical management.  As such, I would suggest a course of physical therapy initially.  I have emphasized the importance of documenting that she has done the therapy.  I would have her try a trial of meloxicam as well over the next 4 to 6 weeks.    If her symptoms do not improve, then I do think she would be a candidate for revision surgery, given the severity of her adjacent level stenosis.  Again, the degree of stenosis that she has would warrant consideration of surgery.  However at this point, she is really not had much nonsurgical management.    She has a good understanding of this.    She will begin a therapy program and a trial of nonsteroidals and she will keep me updated on her progress.    ** Dictated with voice recognition software and not immediately reviewed for errors in grammar and/or spelling **

## 2023-11-29 NOTE — PROGRESS NOTES
Yesi is a pleasant 58-year-old woman who 3 years ago had a revision decompression and fusion at L4-5.  She did extremely well with this.  She works at [employer] in the [place].  She had been able to return to full activity.  Her job description changed over the last year and she has been doing quite a bit of bending and lifting and twisting.  Over the last 6 months she has developed rather severe recurrent low back and radiating bilateral leg pain, right greater than left.    No treatment at this point.    She did see Dr. Guevara and they discussed different treatment options.    She continues to work.  However she does have significant back and leg pain with prolonged standing and walking.    Her general health has been excellent.    Family, social, and medical histories are obtained and reviewed.    30-point, patient-recorded Review of Systems is personally obtained and reviewed. Inclusive is no history of weight loss, change in appetite, recent change in activity level, change in bowel or bladder habits, fevers, chills, malaise, or night pain.    She is here today with her       On exam healthy-appearing woman no acute distress.  Slow stable gait.  Limited flexion.  Painless motion both hips.  Her strength is intact in the lower extremities without pathologic reflexes.    Her lumbar MRI does show adjacent level stenosis at L3-4 that is fairly severe in part due to a central disc bulge but also significant facet hypertrophy with fluid within both facet joints.    Impression: She has developed recurrent lumbar radiculopathy that correlates with the adjacent level stenosis at L3-4.  Her symptoms have been present for at least 6 months but he really has not had any nonsurgical management.  As such, I would suggest a course of physical therapy initially.  I have emphasized the importance of documenting that she has done the therapy.  I would have her try a trial of meloxicam as well over the next 4 to 6  weeks.    If her symptoms do not improve, then I do think she would be a candidate for revision surgery, given the severity of her adjacent level stenosis.  Again, the degree of stenosis that she has would warrant consideration of surgery.  However at this point, she is really not had much nonsurgical management.    She has a good understanding of this.    She will begin a therapy program and a trial of nonsteroidals and she will keep me updated on her progress.    ** Dictated with voice recognition software and not immediately reviewed for errors in grammar and/or spelling **

## 2024-01-10 ENCOUNTER — OFFICE VISIT (OUTPATIENT)
Dept: ORTHOPEDIC SURGERY | Facility: CLINIC | Age: 59
End: 2024-01-10
Payer: COMMERCIAL

## 2024-01-10 DIAGNOSIS — M48.061 DEGENERATIVE LUMBAR SPINAL STENOSIS: Primary | ICD-10-CM

## 2024-01-10 PROCEDURE — 99214 OFFICE O/P EST MOD 30 MIN: CPT | Performed by: ORTHOPAEDIC SURGERY

## 2024-01-10 NOTE — LETTER
January 10, 2024     Vamshi Espinoza DO  [address] [facility]  [facility] [address]    Patient: Ioana Mckeon   YOB: 1965   Date of Visit: 1/10/2024       Dear Dr. Vamshi Espinoza DO:    Thank you for referring Ioana Mckeon to me for evaluation. Below are my notes for this consultation.  If you have questions, please do not hesitate to call me. I look forward to following your patient along with you.       Sincerely,     Javed Ayers MD      CC: No Recipients  ______________________________________________________________________________________    Ioana returns for reevaluation.  She is a pleasant 58-year-old woman who had a remote L4-5 decompression and fusion.  She has developed adjacent level disease at L3-4.    She still working.  She is symptomatic with back and bilateral leg pain, especially after prolonged standing and walking.  Symptoms are more severe on the left leg with radiating left buttock and posterior thigh pain with similar occasional symptoms on the right.    She has completed 4 weeks of physical therapy.    She is an avid [employer].    On exam, no change in her neurologic status from prior exam.    We reviewed her imaging.  Her prior fusion is solid.  She does have adjacent level degenerative change at L3-4 with moderately severe central and lateral recess stenosis.    Impression: She is quite symptomatic with lumbar radiculopathy.  She does have adjacent level stenosis.    We talked about her situation at length.  Given the persistence of her symptoms, surgery would be 1 option for her.  I would address this with a lateral interbody fusion at L3-4 and then a revision posterior decompression and extension of her fusion to L3 with instrumentation.    She would like to complete bowling season.    She will keep us updated on her progress when she would like to proceed with surgery.    ** Dictated with voice recognition  software and not immediately reviewed for errors in grammar and/or spelling **

## 2024-01-10 NOTE — PROGRESS NOTES
Ioana returns for reevaluation.  She is a pleasant 58-year-old woman who had a remote L4-5 decompression and fusion.  She has developed adjacent level disease at L3-4.    She still working.  She is symptomatic with back and bilateral leg pain, especially after prolonged standing and walking.  Symptoms are more severe on the left leg with radiating left buttock and posterior thigh pain with similar occasional symptoms on the right.    She has completed 4 weeks of physical therapy.    She is an avid [employer].    On exam, no change in her neurologic status from prior exam.    We reviewed her imaging.  Her prior fusion is solid.  She does have adjacent level degenerative change at L3-4 with moderately severe central and lateral recess stenosis.    Impression: She is quite symptomatic with lumbar radiculopathy.  She does have adjacent level stenosis.    We talked about her situation at length.  Given the persistence of her symptoms, surgery would be 1 option for her.  I would address this with a lateral interbody fusion at L3-4 and then a revision posterior decompression and extension of her fusion to L3 with instrumentation.    She would like to complete bowling season.    She will keep us updated on her progress when she would like to proceed with surgery.    ** Dictated with voice recognition software and not immediately reviewed for errors in grammar and/or spelling **

## 2024-03-23 ENCOUNTER — HOSPITAL ENCOUNTER (EMERGENCY)
Age: 59
Discharge: HOME | End: 2024-03-23
Payer: COMMERCIAL

## 2024-03-23 VITALS
OXYGEN SATURATION: 99 % | WEIGHT: 187 LBS | TEMPERATURE: 98.2 F | BODY MASS INDEX: 31.12 KG/M2 | RESPIRATION RATE: 16 BRPM | HEART RATE: 89 BPM | SYSTOLIC BLOOD PRESSURE: 144 MMHG | DIASTOLIC BLOOD PRESSURE: 83 MMHG

## 2024-03-23 DIAGNOSIS — N39.0 URINARY TRACT INFECTION WITHOUT HEMATURIA, SITE UNSPECIFIED: Primary | ICD-10-CM

## 2024-03-23 LAB
BACTERIA URNS QL MICRO: ABNORMAL
BILIRUB UR QL STRIP: NEGATIVE
CLARITY UR: ABNORMAL
COLOR UR: YELLOW
GLUCOSE UR STRIP-MCNC: NEGATIVE MG/DL
HGB UR QL STRIP.AUTO: ABNORMAL
KETONES UR STRIP-MCNC: NEGATIVE MG/DL
LEUKOCYTE ESTERASE UR QL STRIP: ABNORMAL
NITRITE UR QL STRIP: POSITIVE
PH UR STRIP: 6 [PH] (ref 5–9)
PROT UR STRIP-MCNC: 30 MG/DL
RBC #/AREA URNS HPF: ABNORMAL /HPF
SP GR UR STRIP: 1.02 (ref 1–1.03)
UROBILINOGEN UR STRIP-ACNC: 0.2 EU/DL (ref 0–1)
WBC #/AREA URNS HPF: ABNORMAL /HPF

## 2024-03-23 PROCEDURE — 99211 OFF/OP EST MAY X REQ PHY/QHP: CPT

## 2024-03-23 PROCEDURE — 81001 URINALYSIS AUTO W/SCOPE: CPT

## 2024-03-23 PROCEDURE — 87077 CULTURE AEROBIC IDENTIFY: CPT

## 2024-03-23 PROCEDURE — 87086 URINE CULTURE/COLONY COUNT: CPT

## 2024-03-23 RX ORDER — NITROFURANTOIN 25; 75 MG/1; MG/1
100 CAPSULE ORAL 2 TIMES DAILY
Qty: 10 CAPSULE | Refills: 0 | Status: SHIPPED | OUTPATIENT
Start: 2024-03-23 | End: 2024-03-28

## 2024-03-23 RX ORDER — PHENAZOPYRIDINE HYDROCHLORIDE 100 MG/1
200 TABLET, FILM COATED ORAL 3 TIMES DAILY PRN
Qty: 6 TABLET | Refills: 0 | Status: SHIPPED | OUTPATIENT
Start: 2024-03-23

## 2024-03-23 ASSESSMENT — PAIN DESCRIPTION - DESCRIPTORS: DESCRIPTORS: DISCOMFORT;ACHING

## 2024-03-23 ASSESSMENT — PAIN - FUNCTIONAL ASSESSMENT: PAIN_FUNCTIONAL_ASSESSMENT: 0-10

## 2024-03-23 ASSESSMENT — PAIN DESCRIPTION - LOCATION: LOCATION: BACK

## 2024-03-23 ASSESSMENT — PAIN SCALES - GENERAL: PAINLEVEL_OUTOF10: 3

## 2024-03-23 NOTE — ED PROVIDER NOTES
Department of Emergency Medicine   [facility]  Provider Note  Admit Date/RoomTime: 3/23/2024 11:21 AM  Room:       NAME: Grecia Cardozo  : 1965  MRN: [id number]     Chief Complaint:  Urinary Tract Infection (Frequency/burning/back pain )    History of Present Illness       Grecia Cardozo is a 58 y.o. old female who has a past medical history of:   Past Medical History:   Diagnosis Date    Arthritis     knee    Asthma     Cancer (HCC) 2007    ovarian    H/O cardiovascular stress test 2017    lexiscan    Hiatal hernia with gastroesophageal reflux     Hx of cardiovascular stress test 10/2012    lexiscan nuclear stress    Hyperlipidemia     Hypoactive thyroid     no meds    Osteoarthritis     PONV (postoperative nausea and vomiting)     Preoperative clearance 2017    Medical clearance from Dr. Potter in EPIC notes    Ulcer of gastroesophageal junction     presents to the urgent care center by private vehicle, fo a possible urinary tract infection she is having frequency burning and urgency.  She said this started at 12:30 last night and has progressively gotten worse she does not have any abdominal pain does not have any pain over her kidney areas.  She said that she does have some low back pain but that is chronic she sees a specialist for it she has had surgery and she said she needs surgery again on her back that is why she is here she is here for the urinary symptoms  ROS    Pertinent positives and negatives are stated within HPI, all other systems reviewed and are negative.    Past Surgical History:   Procedure Laterality Date    CHOLECYSTECTOMY      COLONOSCOPY      FRACTURE SURGERY Right     humerus, hardware in place    HEMORRHOID SURGERY      HUMERUS CLOSED REDUCTION      HYSTERECTOMY (CERVIX STATUS UNKNOWN)  2007    and bso    JOINT REPLACEMENT Left 2018    KNEE ARTHROPLASTY Right 2017    KNEE ARTHROSCOPY  right    LUMBAR    Urinalysis   Result Value Ref Range    Color, UA Yellow Yellow    Turbidity UA SLIGHTLY CLOUDY (A) Clear    Glucose, Ur NEGATIVE NEGATIVE mg/dL    Bilirubin Urine NEGATIVE NEGATIVE    Ketones, Urine NEGATIVE NEGATIVE mg/dL    Specific Gravity, UA 1.025 1.005 - 1.030    Urine Hgb MODERATE (A) NEGATIVE    pH, UA 6.0 5.0 - 9.0    Protein, UA 30 (A) NEGATIVE mg/dL    Urobilinogen, Urine 0.2 0.0 - 1.0 EU/dL    Nitrite, Urine POSITIVE (A) NEGATIVE    Leukocyte Esterase, Urine MODERATE (A) NEGATIVE   Microscopic Urinalysis   Result Value Ref Range    WBC, UA 21 TO 50 (A) 0 TO 5 /HPF    RBC, UA 21 TO 50 (A) 0 TO 2 /HPF    Bacteria, UA 2+ (A) None       Imaging:  All Radiology results interpreted by Radiologist unless otherwise noted.  No orders to display     ED Course / Medical Decision Making   Medications - No data to display       Consults:   None    Medical Decision Making:    Patient is well appearing, non toxic and appropriate for outpatient management.  Plan is for symptom management and PCP follow up.   She is positive for UTI.  So I did start her on Macrobid since she has allergies to some antibiotics also also some Pyridium for the discomfort advised plenty of fluids and advised her to follow-up with her PCP    Assessment      1. Urinary tract infection without hematuria, site unspecified      Plan   Disposition: Discharge to home and advised to contact Valentino Melo DO  [address] [place] [address]  [phone]    Schedule an appointment as soon as possible for a visit      Patient condition is good    New Medications     New Prescriptions    NITROFURANTOIN, MACROCRYSTAL-MONOHYDRATE, (MACROBID) 100 MG CAPSULE    Take 1 capsule by mouth 2 times daily for 5 days    PHENAZOPYRIDINE (PYRIDIUM) 100 MG TABLET    Take 2 tablets by mouth 3 times daily as needed for Pain (with urination)     Electronically signed by NEO Brumfield CNP   DD: 3/23/24  **This report was transcribed using voice

## 2024-03-24 LAB
MICROORGANISM SPEC CULT: ABNORMAL
SPECIMEN DESCRIPTION: ABNORMAL

## 2024-03-25 LAB
MICROORGANISM SPEC CULT: ABNORMAL
SPECIMEN DESCRIPTION: ABNORMAL

## 2024-03-27 NOTE — CARE COORDINATION
-- DO NOT REPLY / DO NOT REPLY ALL --  -- Message is from Engagement Center Operations (ECO) --    General Patient Message: Estrellita at [facility] is calling to speak with someone about the patients Lantus/Novolog to verify dosing and whether or not its a sliding scale. Can someone please assist?     Requested high priority.     Called office advised team no response     Caller Information         Type Contact Phone/Fax    03/27/2024 01:55 PM CDT Phone (Incoming) Estrellita at [facility] [phone]          Alternative phone number: NA    Can a detailed message be left? Yes    Message Turnaround:     Is it Working Hours? Yes - Working Hours                      COVID-19 ED/Flu Clinic Initial Outreach Note      This Raphael Waggoner made attempt to contact the patient by telephone to perform post discharge call. Left message with reason for call. Patient was seen for back pain. No follow up appointments due to not having insurance.

## 2024-07-22 ENCOUNTER — APPOINTMENT (OUTPATIENT)
Dept: CT IMAGING | Age: 59
End: 2024-07-22
Payer: COMMERCIAL

## 2024-07-22 ENCOUNTER — HOSPITAL ENCOUNTER (EMERGENCY)
Age: 59
Discharge: HOME | End: 2024-07-22
Attending: EMERGENCY MEDICINE
Payer: COMMERCIAL

## 2024-07-22 VITALS
TEMPERATURE: 97.4 F | RESPIRATION RATE: 18 BRPM | SYSTOLIC BLOOD PRESSURE: 142 MMHG | HEART RATE: 89 BPM | OXYGEN SATURATION: 95 % | BODY MASS INDEX: 32.45 KG/M2 | WEIGHT: 195 LBS | DIASTOLIC BLOOD PRESSURE: 74 MMHG

## 2024-07-22 DIAGNOSIS — K29.00 ACUTE SUPERFICIAL GASTRITIS WITHOUT HEMORRHAGE: ICD-10-CM

## 2024-07-22 DIAGNOSIS — R10.13 EPIGASTRIC PAIN: Primary | ICD-10-CM

## 2024-07-22 LAB
ALBUMIN SERPL-MCNC: 4.6 G/DL (ref 3.5–5.2)
ALP SERPL-CCNC: 83 U/L (ref 35–104)
ALT SERPL-CCNC: 7 U/L (ref 0–32)
ANION GAP SERPL CALCULATED.3IONS-SCNC: 11 MMOL/L (ref 7–16)
AST SERPL-CCNC: 20 U/L (ref 0–31)
BASOPHILS # BLD: 0.02 K/UL (ref 0–0.2)
BASOPHILS NFR BLD: 1 % (ref 0–2)
BILIRUB DIRECT SERPL-MCNC: <0.2 MG/DL (ref 0–0.3)
BILIRUB INDIRECT SERPL-MCNC: NORMAL MG/DL (ref 0–1)
BILIRUB SERPL-MCNC: 0.3 MG/DL (ref 0–1.2)
BILIRUB UR QL STRIP: NEGATIVE
BUN SERPL-MCNC: 11 MG/DL (ref 6–23)
CALCIUM SERPL-MCNC: 9.4 MG/DL (ref 8.6–10.2)
CHLORIDE SERPL-SCNC: 102 MMOL/L (ref 98–107)
CLARITY UR: CLEAR
CO2 SERPL-SCNC: 27 MMOL/L (ref 22–29)
COLOR UR: YELLOW
COMMENT: ABNORMAL
CREAT SERPL-MCNC: 1 MG/DL (ref 0.5–1)
EOSINOPHIL # BLD: 0.07 K/UL (ref 0.05–0.5)
EOSINOPHILS RELATIVE PERCENT: 2 % (ref 0–6)
ERYTHROCYTE [DISTWIDTH] IN BLOOD BY AUTOMATED COUNT: 12.9 % (ref 11.5–15)
GFR, ESTIMATED: 62 ML/MIN/1.73M2
GLUCOSE SERPL-MCNC: 98 MG/DL (ref 74–99)
GLUCOSE UR STRIP-MCNC: NEGATIVE MG/DL
HCT VFR BLD AUTO: 34.5 % (ref 34–48)
HGB BLD-MCNC: 11.7 G/DL (ref 11.5–15.5)
HGB UR QL STRIP.AUTO: NEGATIVE
IMM GRANULOCYTES # BLD AUTO: <0.03 K/UL (ref 0–0.58)
IMM GRANULOCYTES NFR BLD: 0 % (ref 0–5)
KETONES UR STRIP-MCNC: NEGATIVE MG/DL
LACTATE BLDV-SCNC: 1.7 MMOL/L (ref 0.5–2.2)
LEUKOCYTE ESTERASE UR QL STRIP: NEGATIVE
LIPASE SERPL-CCNC: 52 U/L (ref 13–60)
LYMPHOCYTES NFR BLD: 1.11 K/UL (ref 1.5–4)
LYMPHOCYTES RELATIVE PERCENT: 39 % (ref 20–42)
MAGNESIUM SERPL-MCNC: 2 MG/DL (ref 1.6–2.6)
MCH RBC QN AUTO: 30.4 PG (ref 26–35)
MCHC RBC AUTO-ENTMCNC: 33.9 G/DL (ref 32–34.5)
MCV RBC AUTO: 89.6 FL (ref 80–99.9)
MONOCYTES NFR BLD: 0.36 K/UL (ref 0.1–0.95)
MONOCYTES NFR BLD: 13 % (ref 2–12)
NEUTROPHILS NFR BLD: 46 % (ref 43–80)
NEUTS SEG NFR BLD: 1.31 K/UL (ref 1.8–7.3)
NITRITE UR QL STRIP: NEGATIVE
PH UR STRIP: 5.5 [PH] (ref 5–9)
PLATELET # BLD AUTO: 172 K/UL (ref 130–450)
PMV BLD AUTO: 11.7 FL (ref 7–12)
POTASSIUM SERPL-SCNC: 4.2 MMOL/L (ref 3.5–5)
PROT SERPL-MCNC: 7.2 G/DL (ref 6.4–8.3)
PROT UR STRIP-MCNC: NEGATIVE MG/DL
RBC # BLD AUTO: 3.85 M/UL (ref 3.5–5.5)
SODIUM SERPL-SCNC: 140 MMOL/L (ref 132–146)
SP GR UR STRIP: <1.005 (ref 1–1.03)
TROPONIN I SERPL HS-MCNC: <6 NG/L (ref 0–9)
UROBILINOGEN UR STRIP-ACNC: 0.2 EU/DL (ref 0–1)
WBC OTHER # BLD: 2.9 K/UL (ref 4.5–11.5)

## 2024-07-22 PROCEDURE — 6360000004 HC RX CONTRAST MEDICATION: Performed by: RADIOLOGY

## 2024-07-22 PROCEDURE — 74177 CT ABD & PELVIS W/CONTRAST: CPT

## 2024-07-22 PROCEDURE — 6360000002 HC RX W HCPCS: Performed by: EMERGENCY MEDICINE

## 2024-07-22 PROCEDURE — 96375 TX/PRO/DX INJ NEW DRUG ADDON: CPT

## 2024-07-22 PROCEDURE — 83735 ASSAY OF MAGNESIUM: CPT

## 2024-07-22 PROCEDURE — 85025 COMPLETE CBC W/AUTO DIFF WBC: CPT

## 2024-07-22 PROCEDURE — 80053 COMPREHEN METABOLIC PANEL: CPT

## 2024-07-22 PROCEDURE — 96374 THER/PROPH/DIAG INJ IV PUSH: CPT

## 2024-07-22 PROCEDURE — 83605 ASSAY OF LACTIC ACID: CPT

## 2024-07-22 PROCEDURE — 84484 ASSAY OF TROPONIN QUANT: CPT

## 2024-07-22 PROCEDURE — 2580000003 HC RX 258: Performed by: EMERGENCY MEDICINE

## 2024-07-22 PROCEDURE — 81003 URINALYSIS AUTO W/O SCOPE: CPT

## 2024-07-22 PROCEDURE — 93005 ELECTROCARDIOGRAM TRACING: CPT | Performed by: EMERGENCY MEDICINE

## 2024-07-22 PROCEDURE — 83690 ASSAY OF LIPASE: CPT

## 2024-07-22 PROCEDURE — 82248 BILIRUBIN DIRECT: CPT

## 2024-07-22 PROCEDURE — 99285 EMERGENCY DEPT VISIT HI MDM: CPT

## 2024-07-22 RX ORDER — KETOROLAC TROMETHAMINE 30 MG/ML
30 INJECTION, SOLUTION INTRAMUSCULAR; INTRAVENOUS ONCE
Status: COMPLETED | OUTPATIENT
Start: 2024-07-22 | End: 2024-07-22

## 2024-07-22 RX ORDER — OMEPRAZOLE 20 MG/1
20 CAPSULE, DELAYED RELEASE ORAL EVERY 12 HOURS
Qty: 28 CAPSULE | Refills: 0 | Status: SHIPPED | OUTPATIENT
Start: 2024-07-22 | End: 2024-08-05

## 2024-07-22 RX ORDER — SUCRALFATE 1 G/1
1 TABLET ORAL 4 TIMES DAILY
Qty: 40 TABLET | Refills: 0 | Status: SHIPPED | OUTPATIENT
Start: 2024-07-22

## 2024-07-22 RX ORDER — ONDANSETRON 2 MG/ML
4 INJECTION INTRAMUSCULAR; INTRAVENOUS ONCE
Status: COMPLETED | OUTPATIENT
Start: 2024-07-22 | End: 2024-07-22

## 2024-07-22 RX ORDER — 0.9 % SODIUM CHLORIDE 0.9 %
1000 INTRAVENOUS SOLUTION INTRAVENOUS ONCE
Status: COMPLETED | OUTPATIENT
Start: 2024-07-22 | End: 2024-07-22

## 2024-07-22 RX ORDER — PROMETHAZINE HYDROCHLORIDE 25 MG/1
25 TABLET ORAL EVERY 6 HOURS PRN
Qty: 10 TABLET | Refills: 0 | Status: SHIPPED | OUTPATIENT
Start: 2024-07-22

## 2024-07-22 RX ADMIN — IOPAMIDOL 75 ML: 755 INJECTION, SOLUTION INTRAVENOUS at 18:45

## 2024-07-22 RX ADMIN — SODIUM CHLORIDE 1000 ML: 9 INJECTION, SOLUTION INTRAVENOUS at 16:03

## 2024-07-22 RX ADMIN — ONDANSETRON 4 MG: 2 INJECTION INTRAMUSCULAR; INTRAVENOUS at 16:04

## 2024-07-22 RX ADMIN — KETOROLAC TROMETHAMINE 30 MG: 30 INJECTION, SOLUTION INTRAMUSCULAR at 16:06

## 2024-07-22 ASSESSMENT — ENCOUNTER SYMPTOMS
BACK PAIN: 0
CHEST TIGHTNESS: 0
DIARRHEA: 0
ABDOMINAL DISTENTION: 0
BLOOD IN STOOL: 0
SORE THROAT: 0
NAUSEA: 1
WHEEZING: 0
VOMITING: 0
COUGH: 0
ABDOMINAL PAIN: 1
SHORTNESS OF BREATH: 0

## 2024-07-22 NOTE — ED PROVIDER NOTES
Chief complaint:  Abdominal pain      HPI history provided by the patient  The patient comes in complaining of almost a week of gradually worsening mid abdominal to epigastric abdominal pain, worse after she eats and drinks anything.  Already has a history of cholecystectomy.  No chest pain, palpitations or shortness of breath.  No lightheadedness or syncope.  No blood in the stool.  No vomiting or diarrhea.  Has some nausea with it.  No flank pain, hematuria or dysuria.    Review of Systems   Constitutional:  Negative for chills, diaphoresis, fatigue and fever.   HENT:  Negative for congestion and sore throat.    Respiratory:  Negative for cough, chest tightness, shortness of breath and wheezing.    Cardiovascular:  Negative for chest pain, palpitations and leg swelling.   Gastrointestinal:  Positive for abdominal pain and nausea. Negative for abdominal distention, blood in stool, diarrhea and vomiting.   Genitourinary:  Negative for dysuria, flank pain, frequency, hematuria and urgency.   Musculoskeletal:  Negative for arthralgias, back pain, gait problem, joint swelling, myalgias, neck pain and neck stiffness.   Skin:  Negative for rash and wound.   Neurological:  Negative for dizziness, seizures, syncope, weakness, light-headedness and headaches.   Hematological:  Negative for adenopathy.   All other systems reviewed and are negative.       Physical Exam  Vitals and nursing note reviewed.   Constitutional:       General: She is awake. She is not in acute distress.     Appearance: She is well-developed. She is not ill-appearing, toxic-appearing or diaphoretic.   HENT:      Head: Normocephalic and atraumatic.   Eyes:      General: No scleral icterus.     Pupils: Pupils are equal, round, and reactive to light.   Cardiovascular:      Rate and Rhythm: Normal rate and regular rhythm.      Heart sounds: Normal heart sounds. No murmur heard.  Pulmonary:      Effort: Pulmonary effort is normal. No respiratory distress.

## 2024-07-22 NOTE — ED NOTES
Department of Emergency Medicine  FIRST PROVIDER TRIAGE NOTE             Independent MLP           7/22/24  3:00 PM EDT    Date of Encounter: 7/22/24   MRN: [id number]      HPI: Grecia Cardozo is a 59 y.o. female who presents to the ED for Abdominal Pain (Mid upper. Gradually getting worse over the weekend. Nausea)    Upper abdominal pain       ROS: Negative for Suicidal ideation or Homicidal Ideation.    PE: Gen Appearance/Constitutional: alert    Heart rrr    Lungs clear      Initial Plan of Care: All treatment areas with department are currently occupied. Plan to order/Initiate the following while awaiting opening in ED: labs, EKG, and imaging studies.  Initiate Treatment-Testing, Proceed toTreatment Area When Bed Available for ED Attending/MLP to Continue Care    Electronically signed by ROGERS Damon   DD: 7/22/24

## 2024-07-23 LAB
EKG ATRIAL RATE: 51 BPM
EKG P AXIS: 31 DEGREES
EKG P-R INTERVAL: 168 MS
EKG Q-T INTERVAL: 432 MS
EKG QRS DURATION: 80 MS
EKG QTC CALCULATION (BAZETT): 398 MS
EKG R AXIS: 47 DEGREES
EKG T AXIS: 5 DEGREES
EKG VENTRICULAR RATE: 51 BPM

## 2024-07-23 PROCEDURE — 93010 ELECTROCARDIOGRAM REPORT: CPT | Performed by: INTERNAL MEDICINE

## 2024-08-13 ENCOUNTER — OFFICE VISIT (OUTPATIENT)
Dept: CARDIOLOGY | Facility: HOSPITAL | Age: 59
End: 2024-08-13
Payer: COMMERCIAL

## 2024-08-13 VITALS
SYSTOLIC BLOOD PRESSURE: 121 MMHG | OXYGEN SATURATION: 98 % | DIASTOLIC BLOOD PRESSURE: 70 MMHG | HEART RATE: 71 BPM | WEIGHT: 191.14 LBS

## 2024-08-13 DIAGNOSIS — R07.9 CHEST PAIN, UNSPECIFIED TYPE: Primary | ICD-10-CM

## 2024-08-13 DIAGNOSIS — L97.908: ICD-10-CM

## 2024-08-13 DIAGNOSIS — I83.009: ICD-10-CM

## 2024-08-13 PROCEDURE — 99204 OFFICE O/P NEW MOD 45 MIN: CPT | Performed by: NURSE PRACTITIONER

## 2024-08-13 PROCEDURE — 99214 OFFICE O/P EST MOD 30 MIN: CPT | Performed by: NURSE PRACTITIONER

## 2024-08-13 RX ORDER — DEXTROMETHORPHAN HYDROBROMIDE, GUAIFENESIN 5; 100 MG/5ML; MG/5ML
650 LIQUID ORAL EVERY 8 HOURS PRN
COMMUNITY

## 2024-08-13 RX ORDER — PANTOPRAZOLE SODIUM 20 MG/1
20 TABLET, DELAYED RELEASE ORAL
COMMUNITY

## 2024-08-13 RX ORDER — OXYBUTYNIN CHLORIDE 10 MG/1
10 TABLET, EXTENDED RELEASE ORAL DAILY
COMMUNITY

## 2024-08-13 RX ORDER — SUCRALFATE 1 G/1
1 TABLET ORAL
COMMUNITY

## 2024-08-13 RX ORDER — MONTELUKAST SODIUM 5 MG/1
5 TABLET, CHEWABLE ORAL NIGHTLY
COMMUNITY

## 2024-08-14 NOTE — PROGRESS NOTES
Referred by self for chest pain      History Of Present Illness:    Ioana Mckeon is a 59 y.o. female with h/o hiatal hernia, GERD, osteoarthritis, asthma, lumbar L4-5 decompression fusion, ?atrial fibrillation (isolated event ) ~10yrs ago presenting today to the [facility] for evaluation of bradycardia as well as intermittent chest pain.   She has been under a large amount of stress over the past year-- her daughter unfortunately passed away earlier this year and her  underwent CABG last Spring and is continuing to recover.  She was recently evaluated in the ER for abdominal pain-- incidentally was found to be sinus bradycardic on ECG with HR in the 50's and asymptomatic. She denies feeling dizzy, lightheaded.  No syncope.  Also reports having intermittent chest pain with occasional radiation to the left arm-- occurs with exertion for example when she is at work when she is walking fast or feels stressed, but also occurs at rest.  Also reports intermittent LE edema-- would like to see vascular for varicose vein treatment.       Past Medical History:  She has a past medical history of Gastro-esophageal reflux disease with esophagitis, without bleeding, Personal history of other diseases of the respiratory system, Personal history of other diseases of the respiratory system, and Personal history of other endocrine, nutritional and metabolic disease.    Past Surgical History:  She has a past surgical history that includes Cholecystectomy (09/26/2013); Other surgical history (09/26/2013); and Hysterectomy (09/26/2013).      Social History:  She has no history on file for tobacco use, alcohol use, and drug use.    Family History:  No family history on file.     Allergies:  Penicillins, Sulfa (sulfonamide antibiotics), and Tylox [oxycodone-acetaminophen]    Outpatient Medications:  Current Outpatient Medications   Medication Instructions    acetaminophen (TYLENOL 8 HOUR) 650 mg,  "oral, Every 8 hours PRN, Do not crush, chew, or split.    meloxicam (MOBIC) 15 mg, oral, Daily    montelukast (SINGULAIR) 5 mg, oral, Nightly    oxybutynin XL (DITROPAN-XL) 10 mg, oral, Daily, Do not crush, chew, or split.    pantoprazole (PROTONIX) 20 mg, oral, Daily before breakfast, Do not crush, chew, or split.    sucralfate (CARAFATE) 1 g, oral, 4 times daily before meals and nightly        Last Recorded Vitals:  Vitals:    08/13/24 1409   BP: 121/70   Pulse: 71   SpO2: 98%   Weight: 86.7 kg (191 lb 2.2 oz)       Physical Exam:  Constitutional: Pleasant, Awake/Alert/Oriented to person place and time. No distress  Head: Atraumatic, Normocephalic  Eyes: EOMI. MARY  Neck: No JVD  Cardiovascular: Regular rate and rhythm, S1, S2. No extra heart sounds or murmurs  Respiratory: Clear to auscultation bilaterally. No wheezing, rales or rhonchi. Good chest wall expansion  Abdomen: Soft, Nontender, Obese. Bowel sounds appreciated  Musculoskeletal: ROM intact. Muscle strength grossly intact upper and lower extremities 5/5.   Neurological: CNII-XII intact. Sensation grossly intact  Extremities: Warm and dry. No acute rashes and lesions  Psychiatric: Appropriate mood and affect         Last Labs:  CBC -  Lab Results   Component Value Date    WBC 3.1 (L) 08/25/2023    WBC CANCELED 08/25/2023    WBC CANCELED 08/25/2023    HGB 12.3 08/25/2023    HGB CANCELED 08/25/2023    HGB CANCELED 08/25/2023    HCT 37.5 08/25/2023    HCT CANCELED 08/25/2023    HCT CANCELED 08/25/2023    MCV 92 08/25/2023    MCV CANCELED 08/25/2023    MCV CANCELED 08/25/2023     08/25/2023    PLT CANCELED 08/25/2023    PLT CANCELED 08/25/2023       CMP -  Lab Results   Component Value Date    CALCIUM 8.2 (L) 05/29/2021       LIPID PANEL -   No results found for: \"CHOL\", \"TRIG\", \"HDL\", \"CHHDL\", \"LDLF\", \"VLDL\", \"NHDL\"    RENAL FUNCTION PANEL -   Lab Results   Component Value Date    GLUCOSE 100 (H) 05/29/2021     05/29/2021    K 4.3 05/29/2021    "  (H) 2021    CO2 28 2021    ANIONGAP 9 (L) 2021    BUN 14 2021    CREATININE 0.88 2021    CALCIUM 8.2 (L) 2021        Lab Results   Component Value Date    HGBA1C 5.1 2022       Last Cardiology Tests:  EC2024 ECG at OSH: SB, HR 51bpm     Stress Test:  6/15/2017 stress test:  DESCRIPTION OF PROCEDURE:  The patient was brought to the cardiac stress lab   and connected to continuous cardiac monitoring.  Resting EKG indicated normal   sinus rhythm.  She was administered Lexiscan over 10-15 seconds, followed by   injection of Cardiolite.  She was placed in recovery at one minute.   Throughout the examination, she had no unusual symptomatology.  She had no   chest pain or shortness of breath.  There was no ectopy or dysrhythmia   identified.  She had a mild inversion of her T-wave, which resolved   spontaneously.  Otherwise, there were no ST or T-wave abnormalities.  She had   physiologic response to both blood pressure and heart rate.  There did not   appear to be any evidence of Lexiscan-induced ischemia.  She will now be   transferred to the imaging lab for final stress pictures.     Cardiac Imagin2018 CTA coronaries:  IMPRESSION:   1. Unremarkable coronary CTA. Both the right and left coronary   arteries are widely patent.   2. Dominant right coronary artery.     Lab review: I have personally reviewed the laboratory result(s)   Diagnostic review: I have personally reviewed the result(s) of the stress CTA coronary     Assessment/Plan   Very pleasant 59 y.o. female with h/o hiatal hernia, GERD, osteoarthritis, asthma, lumbar L4-5 decompression fusion, ?atrial fibrillation (isolated event ) ~10yrs ago presenting today to the [facility] for evaluation of asymptomatic sinus bradycardia as well as intermittent chest pain.     Plan:  -Recommend obtaining an exercise nuclear stress test for evaluation of stress induced  ischemia.  -Referral placed to Dr. Catherine Mccall for evaluation of varicose veins.       MAXWELL Giron-CNP

## 2024-08-21 ENCOUNTER — HOSPITAL ENCOUNTER (OUTPATIENT)
Dept: RADIOLOGY | Facility: HOSPITAL | Age: 59
Discharge: HOME | End: 2024-08-21
Payer: COMMERCIAL

## 2024-08-21 ENCOUNTER — HOSPITAL ENCOUNTER (OUTPATIENT)
Dept: CARDIOLOGY | Facility: HOSPITAL | Age: 59
Discharge: HOME | End: 2024-08-21
Payer: COMMERCIAL

## 2024-08-21 DIAGNOSIS — R07.9 CHEST PAIN, UNSPECIFIED TYPE: ICD-10-CM

## 2024-08-21 PROCEDURE — 78452 HT MUSCLE IMAGE SPECT MULT: CPT | Performed by: STUDENT IN AN ORGANIZED HEALTH CARE EDUCATION/TRAINING PROGRAM

## 2024-08-21 PROCEDURE — 3430000001 HC RX 343 DIAGNOSTIC RADIOPHARMACEUTICALS: Performed by: NURSE PRACTITIONER

## 2024-08-21 PROCEDURE — 93017 CV STRESS TEST TRACING ONLY: CPT

## 2024-08-21 PROCEDURE — A9502 TC99M TETROFOSMIN: HCPCS | Performed by: NURSE PRACTITIONER

## 2024-08-21 PROCEDURE — 78452 HT MUSCLE IMAGE SPECT MULT: CPT

## 2024-09-05 LAB
25(OH)D3 SERPL-MCNC: 24.2 NG/ML (ref 30–100)
ALBUMIN SERPL-MCNC: 4.3 G/DL (ref 3.5–5.2)
ALP SERPL-CCNC: 79 U/L (ref 35–104)
ALT SERPL-CCNC: 9 U/L (ref 0–32)
ANION GAP SERPL CALCULATED.3IONS-SCNC: 11 MMOL/L (ref 7–16)
AST SERPL-CCNC: 18 U/L (ref 0–31)
BILIRUB SERPL-MCNC: 0.2 MG/DL (ref 0–1.2)
BUN SERPL-MCNC: 18 MG/DL (ref 6–23)
CALCIUM SERPL-MCNC: 9.1 MG/DL (ref 8.6–10.2)
CHLORIDE SERPL-SCNC: 104 MMOL/L (ref 98–107)
CHOLEST SERPL-MCNC: 210 MG/DL
CO2 SERPL-SCNC: 25 MMOL/L (ref 22–29)
CREAT SERPL-MCNC: 1 MG/DL (ref 0.5–1)
GFR, ESTIMATED: 66 ML/MIN/1.73M2
GLUCOSE SERPL-MCNC: 105 MG/DL (ref 74–99)
HDLC SERPL-MCNC: 57 MG/DL
LDLC SERPL CALC-MCNC: 119 MG/DL
POTASSIUM SERPL-SCNC: 4.5 MMOL/L (ref 3.5–5)
PROT SERPL-MCNC: 7.4 G/DL (ref 6.4–8.3)
SODIUM SERPL-SCNC: 140 MMOL/L (ref 132–146)
TRIGL SERPL-MCNC: 169 MG/DL
TSH SERPL DL<=0.05 MIU/L-ACNC: 1.57 UIU/ML (ref 0.27–4.2)
VLDLC SERPL CALC-MCNC: 34 MG/DL

## 2024-10-23 ENCOUNTER — APPOINTMENT (OUTPATIENT)
Dept: ORTHOPEDIC SURGERY | Facility: CLINIC | Age: 59
End: 2024-10-23
Payer: COMMERCIAL

## 2024-10-23 DIAGNOSIS — M48.061 DEGENERATIVE LUMBAR SPINAL STENOSIS: ICD-10-CM

## 2024-10-23 PROCEDURE — 99214 OFFICE O/P EST MOD 30 MIN: CPT | Performed by: ORTHOPAEDIC SURGERY

## 2024-10-23 ASSESSMENT — PAIN SCALES - GENERAL: PAINLEVEL_OUTOF10: 10 - WORST POSSIBLE PAIN

## 2024-10-23 ASSESSMENT — PAIN - FUNCTIONAL ASSESSMENT: PAIN_FUNCTIONAL_ASSESSMENT: 0-10

## 2024-10-23 ASSESSMENT — PAIN DESCRIPTION - DESCRIPTORS: DESCRIPTORS: NUMBNESS;TINGLING

## 2024-10-23 NOTE — LETTER
October 23, 2024     Yon Pryor DO  [address] [place] [address]    Patient: Ioana Mckeon   YOB: 1965   Date of Visit: 10/23/2024       Dear Dr. Yon Pryor DO:    Thank you for referring Ioana Mckeon to me for evaluation. Below are my notes for this consultation.  If you have questions, please do not hesitate to call me. I look forward to following your patient along with you.       Sincerely,     Javed Ayers MD      CC: No Recipients  ______________________________________________________________________________________    Ioana and her  returned.  She is a pleasant 59-year-old woman who has had a prior L4-5 fusion that she did well with.  She works full-time at [employer].  Her job description has been changed to avoid a lot of heavy bending and lifting but she still has to be on her feet and occasionally do heavy lifting that really aggravates her back pain.    We had considered surgery earlier this year.  Unfortunately her adult daughter passed away somewhat suddenly earlier this year.    On exam today her posture is upright.  Her gait is stable.  She has developed weakness in ankle dorsiflexion bilateral, 4/5.    Her prior MRI at [facility] had suggested significant stenosis at L3-4.    Given the persistence and severity of her symptoms and the progression of her weakness, updated imaging with an MRI with metal artifact reduction of the lumbar spine is indicated.  If she continues to have severe stenosis at L3-4, she would be a candidate for additional surgery in the form of a decompression and extending her fusion proximally.    We discussed the risks and benefits and expectations of surgery.    I will speak with her after her MRI is done.    ** Dictated with voice recognition software and not immediately reviewed for errors in grammar and/or spelling **

## 2024-10-23 NOTE — LETTER
October 23, 2024     Patient: Ioana Mckeon   YOB: 1965   Date of Visit: 10/23/2024       To Whom It May Concern:    It is my medical opinion that Ioana Mckeon  should not lift anything greater than 10 pounds, until she receives results from her MRI .    If you have any questions or concerns, please don't hesitate to call.         Sincerely,        Javed Ayers MD    CC: No Recipients

## 2024-10-23 NOTE — PROGRESS NOTES
Ioana and her  returned.  She is a pleasant 59-year-old woman who has had a prior L4-5 fusion that she did well with.  She works full-time at [employer].  Her job description has been changed to avoid a lot of heavy bending and lifting but she still has to be on her feet and occasionally do heavy lifting that really aggravates her back pain.    We had considered surgery earlier this year.  Unfortunately her adult daughter passed away somewhat suddenly earlier this year.    On exam today her posture is upright.  Her gait is stable.  She has developed weakness in ankle dorsiflexion bilateral, 4/5.    Her prior MRI at [facility] had suggested significant stenosis at L3-4.    Given the persistence and severity of her symptoms and the progression of her weakness, updated imaging with an MRI with metal artifact reduction of the lumbar spine is indicated.  If she continues to have severe stenosis at L3-4, she would be a candidate for additional surgery in the form of a decompression and extending her fusion proximally.    We discussed the risks and benefits and expectations of surgery.    I will speak with her after her MRI is done.    ** Dictated with voice recognition software and not immediately reviewed for errors in grammar and/or spelling **

## 2024-11-07 ENCOUNTER — HOSPITAL ENCOUNTER (OUTPATIENT)
Dept: RADIOLOGY | Facility: HOSPITAL | Age: 59
Discharge: HOME | End: 2024-11-07
Payer: COMMERCIAL

## 2024-11-07 DIAGNOSIS — M48.061 DEGENERATIVE LUMBAR SPINAL STENOSIS: ICD-10-CM

## 2024-11-07 PROCEDURE — 72148 MRI LUMBAR SPINE W/O DYE: CPT | Performed by: RADIOLOGY

## 2024-11-07 PROCEDURE — 72148 MRI LUMBAR SPINE W/O DYE: CPT

## 2024-11-12 ENCOUNTER — DOCUMENTATION (OUTPATIENT)
Dept: ORTHOPEDIC SURGERY | Facility: HOSPITAL | Age: 59
End: 2024-11-12
Payer: COMMERCIAL

## 2024-11-12 NOTE — PROGRESS NOTES
I spoke with Yesi.  Her MRI does confirm significant stenosis at L3-4, above her prior surgery.  She has been quite symptomatic with claudication and lumbar radiculopathy.  She is not responded to conservative management.    She is a candidate for surgery in the form of an L3-4 decompression and fusion with a TLIF.    She would like to proceed in January.    Likely she will need to 6 weeks off from work postoperatively.    ** Dictated with voice recognition software and not immediately reviewed for errors in grammar and/or spelling **

## 2024-11-19 DIAGNOSIS — M48.061 DEGENERATIVE LUMBAR SPINAL STENOSIS: ICD-10-CM

## 2024-11-21 RX ORDER — MELOXICAM 15 MG/1
15 TABLET ORAL DAILY
Qty: 90 TABLET | Refills: 3 | Status: SHIPPED | OUTPATIENT
Start: 2024-11-21

## 2024-12-11 ENCOUNTER — OFFICE VISIT (OUTPATIENT)
Dept: CARDIOLOGY | Facility: HOSPITAL | Age: 59
End: 2024-12-11
Payer: COMMERCIAL

## 2024-12-11 VITALS
DIASTOLIC BLOOD PRESSURE: 75 MMHG | WEIGHT: 191.36 LBS | HEART RATE: 76 BPM | SYSTOLIC BLOOD PRESSURE: 132 MMHG | OXYGEN SATURATION: 98 %

## 2024-12-11 DIAGNOSIS — I83.812 VARICOSE VEINS OF LEFT LOWER EXTREMITY WITH PAIN: ICD-10-CM

## 2024-12-11 PROCEDURE — 99213 OFFICE O/P EST LOW 20 MIN: CPT | Performed by: INTERNAL MEDICINE

## 2024-12-11 PROCEDURE — 1036F TOBACCO NON-USER: CPT | Performed by: INTERNAL MEDICINE

## 2024-12-11 NOTE — PATIENT INSTRUCTIONS
You can get compression socks at [employer] or any other store that sells durable medical goods.     Once you have decided where you are going to go for socks, call in advance and find out when the person who measures you for socks will be there. Plan to go fairly early in the day. Many people have swelling that is worse toward the end of the day. If you are measured at the end of the day, you may not get a good fit.    You should put on your compression socks first thing in the morning. Take them off before bed.    If you use lotion or moisturizer on your legs, do NOT put lotion or moisturizer on immediately before you put on your socks, as it will make them difficult to pull up. Use your lotion or moisturizer at night.    Compression socks can be hand-washed or washed in a mesh bag in the washing machine. Air dry them. Do NOT put them in the dryer.    I will refer you to vascular surgery to consider a vein procedure. You can schedule with Dr. Paul Blackman in [place] or Dr. Mary Ann Gary in [place] (or her nurse practitioner, Chitra Freedman, who sees patients in [place]).    To schedule, you should call the [facility] scheduling line at [phone].

## 2024-12-23 PROBLEM — M48.062 SPINAL STENOSIS, LUMBAR REGION WITH NEUROGENIC CLAUDICATION: Status: ACTIVE | Noted: 2024-12-23

## 2025-01-03 ENCOUNTER — CLINICAL SUPPORT (OUTPATIENT)
Dept: PREADMISSION TESTING | Facility: HOSPITAL | Age: 60
End: 2025-01-03
Payer: COMMERCIAL

## 2025-01-03 RX ORDER — ERGOCALCIFEROL 1.25 MG/1
50000 CAPSULE ORAL
COMMUNITY
Start: 2024-05-09

## 2025-01-03 RX ORDER — ROSUVASTATIN CALCIUM 5 MG/1
5 TABLET, COATED ORAL DAILY
COMMUNITY

## 2025-01-03 RX ORDER — ASPIRIN 81 MG/1
81 TABLET ORAL DAILY
COMMUNITY

## 2025-01-03 ASSESSMENT — DUKE ACTIVITY SCORE INDEX (DASI)
CAN YOU RUN A SHORT DISTANCE: YES
CAN YOU PARTICIPATE IN STRENOUS SPORTS LIKE SWIMMING, SINGLES TENNIS, FOOTBALL, BASKETBALL, OR SKIING: NO
CAN YOU DO YARD WORK LIKE RAKING LEAVES, WEEDING OR PUSHING A MOWER: YES
CAN YOU DO LIGHT WORK AROUND THE HOUSE LIKE DUSTING OR WASHING DISHES: YES
CAN YOU CLIMB A FLIGHT OF STAIRS OR WALK UP A HILL: YES
CAN YOU WALK A BLOCK OR TWO ON LEVEL GROUND: YES
CAN YOU TAKE CARE OF YOURSELF (EAT, DRESS, BATHE, OR USE TOILET): YES
CAN YOU DO HEAVY WORK AROUND THE HOUSE LIKE SCRUBBING FLOORS OR LIFTING AND MOVING HEAVY FURNITURE: YES
CAN YOU DO MODERATE WORK AROUND THE HOUSE LIKE VACUUMING, SWEEPING FLOORS OR CARRYING GROCERIES: YES
TOTAL_SCORE: 50.7
DASI METS SCORE: 9
CAN YOU HAVE SEXUAL RELATIONS: YES
CAN YOU PARTICIPATE IN MODERATE RECREATIONAL ACTIVITIES LIKE GOLF, BOWLING, DANCING, DOUBLES TENNIS OR THROWING A BASEBALL OR FOOTBALL: YES
CAN YOU WALK INDOORS, SUCH AS AROUND YOUR HOUSE: YES

## 2025-01-03 ASSESSMENT — ENCOUNTER SYMPTOMS
CARDIOVASCULAR NEGATIVE: 1
GASTROINTESTINAL NEGATIVE: 1
RESPIRATORY NEGATIVE: 1
NEUROLOGICAL NEGATIVE: 1
CONSTITUTIONAL NEGATIVE: 1
NECK NEGATIVE: 1

## 2025-01-03 NOTE — CPM/PAT H&P
CPM/PAT Evaluation       Name: Ioana Mckeon (Ioana Mckeon)  /Age: [birth year]//59 y.o.     { PAT Visit Type:78197}      Chief Complaint: ***    HPI  Ioana Mckeon is scheduled for fusion, spine, Lumbar, TLIF with Dr. Ayers on 25.   Past Medical History:   Diagnosis Date    A-fib (Multi)     (isolated event ) ~10yrs ago    Asthma     GERD (gastroesophageal reflux disease)     Hiatal hernia     Hyperlipidemia     Osteoarthritis     Personal history of other diseases of the respiratory system     History of bronchitis    Personal history of other endocrine, nutritional and metabolic disease     History of hypercholesterolemia    PONV (postoperative nausea and vomiting)     Spinal stenosis     Varicose veins of left lower extremity with pain        Past Surgical History:   Procedure Laterality Date    CHOLECYSTECTOMY      HUMERUS Right     mackenzie and 8 screws    HYSTERECTOMY      KNEE ARTHROPLASTY Bilateral     OTHER SURGICAL HISTORY      Knee Arthroscopy With Medial Meniscectomy       Patient Sexual activity questions deferred to the physician.    No family history on file.    Allergies   Allergen Reactions    Penicillins Other    Sulfa (Sulfonamide Antibiotics) Hives    Tylox [Oxycodone-Acetaminophen] Hives       Prior to Admission medications    Medication Sig Start Date End Date Taking? Authorizing Provider   ergocalciferol (Vitamin D-2) 1.25 MG (14670 UT) capsule Take 1 capsule (50,000 Units) by mouth. 24  Yes Historical Provider, MD   acetaminophen (Tylenol 8 HOUR) 650 mg ER tablet Take 1 tablet (650 mg) by mouth every 8 hours if needed for mild pain (1 - 3). Do not crush, chew, or split.    Historical Provider, MD   aspirin 81 mg EC tablet Take 1 tablet (81 mg) by mouth once daily.    Historical Provider, MD   meloxicam (Mobic) 15 mg tablet TAKE 1 TABLET BY MOUTH EVERY DAY  Patient not taking: Reported on 1/3/2025 11/21/24   Javed Ayers MD   montelukast (Singulair) 5 mg chewable tablet  Chew 1 tablet (5 mg) once daily at bedtime.    Historical Provider, MD   oxybutynin XL (Ditropan-XL) 10 mg 24 hr tablet Take 1 tablet (10 mg) by mouth once daily. Do not crush, chew, or split.    Historical Provider, MD   pantoprazole (ProtoNix) 20 mg EC tablet Take 1 tablet (20 mg) by mouth once daily in the morning. Take before meals. Do not crush, chew, or split.    Historical Provider, MD   rosuvastatin (Crestor) 5 mg tablet Take 1 tablet (5 mg) by mouth once daily.    Historical Provider, MD   sucralfate (Carafate) 1 gram tablet Take 1 tablet (1 g) by mouth 4 times a day before meals.    Historical Provider, MD BOSCH ROS:   Constitutional:   neg    Neuro/Psych:   neg    Eyes:    use of corrective lenses  Ears:   neg    Nose:   neg    Mouth:   neg    Throat:   neg    Neck:   neg    Cardio:   neg    Respiratory:   neg    Endocrine:   GI:   neg    :   neg    Musculoskeletal:   Hematologic:   neg    Skin:  neg        PAT Physical Exam     Airway    Testing/Diagnostic:     MR LUMBAR SPINE WO IV CONTRAST; 11/7/2024   IMPRESSION:  Postoperative changes of the lumbar spine L4-5 at both with residual  degenerative changes most prominently at L3-4.    Nuclear Stress Test; 8/21/24  Impression   1. Negative myocardial perfusion study without evidence of inducible  myocardial ischemia or prior infarction.  2. The left ventricle is normal in size.  3. Normal LV wall motion with a post-stress LV EF estimated greater  than 65%.       CT OF THE ABDOMEN AND PELVIS WITH CONTRAST 7/22/2024   Impression:  1. Mild diverticulosis.   2. No acute process.     Patient Specialist/PCP:   PCP: Rusty Pryor MD  Vascular Medicine: Catherine Mccall MD, MS LOV 12/11/24 seen for symptomatic varicose veins of the left leg for years and newer ones in the right leg. Referred to Dr. Gary and ordered compression stockings.   Orthopaedic Surgery: Javed Ayers MD LOV 11/12/24 seen for lumbar radiculopathy who has not responded to  conservative management. Her MRI does confirm significant stenosis at L3-4, above her prior surgery   Cardiology: ANIBAL Girno LOV 8/13/24 for evaluation of bradycardia as well as intermittent chest pain.    Plan:  -Recommend obtaining an exercise nuclear stress test for evaluation of stress induced ischemia.  -Referral placed to Dr. Catherine Mccall for evaluation of varicose veins.  Requested pre-op clearance from ANIBAL Giron  via staff message on 1/3/25.  Urology: Kathie Uribe MD   Visit Vitals  OB Status Hysterectomy   Smoking Status Never       DASI Risk Score      Flowsheet Row Clinical Support from 1/3/2025 in [facility]   Can you take care of yourself (eat, dress, bathe, or use toilet)?  2.75 filed at 01/03/2025 1440   Can you walk indoors, such as around your house? 1.75 filed at 01/03/2025 1440   Can you walk a block or two on level ground?  2.75 filed at 01/03/2025 1440   Can you climb a flight of stairs or walk up a hill? 5.5 filed at 01/03/2025 1440   Can you run a short distance? 8 filed at 01/03/2025 1440   Can you do light work around the house like dusting or washing dishes? 2.7 filed at 01/03/2025 1440   Can you do moderate work around the house like vacuuming, sweeping floors or carrying groceries? 3.5 filed at 01/03/2025 1440   Can you do heavy work around the house like scrubbing floors or lifting and moving heavy furniture?  8 filed at 01/03/2025 1440   Can you do yard work like raking leaves, weeding or pushing a mower? 4.5 filed at 01/03/2025 1440   Can you have sexual relations? 5.25 filed at 01/03/2025 1440   Can you participate in moderate recreational activities like golf, bowling, dancing, doubles tennis or throwing a baseball or football? 6 filed at 01/03/2025 1440   Can you participate in strenous sports like swimming, singles tennis, football, basketball, or skiing? 0 filed at 01/03/2025 1440   DASI SCORE 50.7 filed at 01/03/2025 1440   METS  Score (Will be calculated only when all the questions are answered) 9 filed at 01/03/2025 1440          Caprini DVT Assessment    No data to display       Modified Frailty Index    No data to display       CHADS2 Stroke Risk  Current as of 3 days ago        N/A 3 to 100%: High Risk   2 to < 3%: Medium Risk   0 to < 2%: Low Risk     Last Change: N/A          This score determines the patient's risk of having a stroke if the patient has atrial fibrillation.        This score is not applicable to this patient. Components are not calculated.          Revised Cardiac Risk Index    No data to display       Apfel Simplified Score    No data to display       Risk Analysis Index Results This Encounter    No data found in the last 10 encounters.       Prodigy: High Risk  Total Score: 8              Prodigy Age Score           ARISCAT Score for Postoperative Pulmonary Complications    No data to display       [place] Perioperative Risk for Myocardial Infarction or Cardiac Arrest (ZARA)    No data to display         Assessment and Plan:   Requested pre-op clearance from ANIBAL Giron  via staff message on 1/3/25.   ONLY    Lourdes Gallego RN  Pre-Admission Testing   {[facility] EMBEDDED ASSESSMENT AND PLAN:31776}

## 2025-01-09 ENCOUNTER — TELEMEDICINE CLINICAL SUPPORT (OUTPATIENT)
Dept: PREADMISSION TESTING | Facility: HOSPITAL | Age: 60
End: 2025-01-09
Payer: COMMERCIAL

## 2025-01-09 DIAGNOSIS — M48.062 SPINAL STENOSIS, LUMBAR REGION WITH NEUROGENIC CLAUDICATION: ICD-10-CM

## 2025-01-09 PROCEDURE — 99422 OL DIG E/M SVC 11-20 MIN: CPT | Performed by: NURSE PRACTITIONER

## 2025-01-09 RX ORDER — CHLORHEXIDINE GLUCONATE ORAL RINSE 1.2 MG/ML
SOLUTION DENTAL
Qty: 15 ML | Refills: 0 | Status: SHIPPED | OUTPATIENT
Start: 2025-01-09

## 2025-01-09 RX ORDER — CHLORHEXIDINE GLUCONATE 40 MG/ML
SOLUTION TOPICAL
Qty: 473 ML | Refills: 0 | Status: SHIPPED | OUTPATIENT
Start: 2025-01-09

## 2025-01-09 ASSESSMENT — ENCOUNTER SYMPTOMS
NAUSEA: 1
NUMBNESS: 1
CONSTITUTIONAL NEGATIVE: 1
ARTHRALGIAS: 1
MYALGIAS: 1
ENDOCRINE NEGATIVE: 1
WEAKNESS: 1
RESPIRATORY NEGATIVE: 1
NECK NEGATIVE: 1
CARDIOVASCULAR NEGATIVE: 1

## 2025-01-09 ASSESSMENT — LIFESTYLE VARIABLES: SMOKING_STATUS: NONSMOKER

## 2025-01-09 NOTE — CPM/PAT H&P
CPM/PAT Evaluation       Name: Ioana Mckeon (Ioana Mckeon)  /Age: 1965/59 y.o.     Visit Type:   Virtual Visit   Virtual or Telephone Consent    An interactive audio and video telecommunication system which permits real time communications between the patient (at the originating site) and provider (at the distant site) was utilized to provide this telehealth service.   Verbal consent was requested and obtained from Ioana Mckeon on this date, 25 for a telehealth visit.        Chief Complaint: perioperative evaluation      HPI  The patient is a 59 year old female with lumbar radiculopathy s/p prior lumbar spine surgery. Her symptoms have not responded to conservative management. She had recent MRI demonstrating significant stenosis at L3-4, above her prior surgery. She wishes to proceed with surgical intervention and presents today for perioperative evaluation in anticipation of FUSION, SPINE, LUMBAR, TLIF on 25 with Dr. Ayers.    Past Medical History:   Diagnosis Date    A-fib (Multi)     (isolated event ) ~10yrs ago    Arthritis     Asthma     Chronic pain disorder     GERD (gastroesophageal reflux disease)     Hiatal hernia     Hyperlipidemia     Joint pain     Nephrolithiasis     Osteoarthritis     Personal history of other diseases of the respiratory system     History of bronchitis    Personal history of other endocrine, nutritional and metabolic disease     History of hypercholesterolemia    PONV (postoperative nausea and vomiting)     Spinal stenosis     Varicose veins of left lower extremity with pain        Past Surgical History:   Procedure Laterality Date    CHOLECYSTECTOMY      HUMERUS Right     mackenzie and 8 screws    HYSTERECTOMY      KNEE ARTHROPLASTY Bilateral     OTHER SURGICAL HISTORY      Knee Arthroscopy With Medial Meniscectomy       Patient Sexual activity questions deferred to the physician.    No family history on file.    Allergies   Allergen Reactions    Penicillins  Other    Sulfa (Sulfonamide Antibiotics) Hives    Tylox [Oxycodone-Acetaminophen] Hives       Prior to Admission medications    Medication Sig Start Date End Date Taking? Authorizing Provider   acetaminophen (Tylenol 8 HOUR) 650 mg ER tablet Take 1 tablet (650 mg) by mouth every 8 hours if needed for mild pain (1 - 3). Do not crush, chew, or split.    Historical Provider, MD   aspirin 81 mg EC tablet Take 1 tablet (81 mg) by mouth once daily.    Historical Provider, MD   ergocalciferol (Vitamin D-2) 1.25 MG (58106 UT) capsule Take 1 capsule (50,000 Units) by mouth. 5/9/24   Historical Provider, MD   meloxicam (Mobic) 15 mg tablet TAKE 1 TABLET BY MOUTH EVERY DAY  Patient not taking: Reported on 1/3/2025 11/21/24   Javed Ayers MD   montelukast (Singulair) 5 mg chewable tablet Chew 1 tablet (5 mg) once daily at bedtime.    Historical Provider, MD   oxybutynin XL (Ditropan-XL) 10 mg 24 hr tablet Take 1 tablet (10 mg) by mouth once daily. Do not crush, chew, or split.    Historical Provider, MD   pantoprazole (ProtoNix) 20 mg EC tablet Take 1 tablet (20 mg) by mouth once daily in the morning. Take before meals. Do not crush, chew, or split.    Historical Provider, MD   rosuvastatin (Crestor) 5 mg tablet Take 1 tablet (5 mg) by mouth once daily.    Historical Provider, MD   sucralfate (Carafate) 1 gram tablet Take 1 tablet (1 g) by mouth 4 times a day before meals.    Historical Provider, MD BOSCH ROS:   Constitutional:   neg    Neuro/Psych:    B/l lower extremities    numbness   weakness  Eyes:    visual disturbance   use of corrective lenses  Ears:   neg    Nose:   neg    Mouth:   neg    Throat:   neg    Neck:   neg    Cardio:   neg    Respiratory:   neg    Endocrine:   neg    GI:    nausea (managed with protonix)  :   neg    Musculoskeletal:    arthralgias   myalgias  Hematologic:   neg    Skin:  neg        Physical Exam  Constitutional:       Appearance: Normal appearance.   HENT:      Head:  Normocephalic.   Musculoskeletal:      Cervical back: Normal range of motion.   Neurological:      Mental Status: She is alert.          PAT AIRWAY:   Airway:     Mallampati::  Unable to assess    Neck ROM::  Full        Visit Vitals  OB Status Hysterectomy   Smoking Status Never       DASI Risk Score      Flowsheet Row Clinical Support from 1/3/2025 in [facility]   Can you take care of yourself (eat, dress, bathe, or use toilet)?  2.75 filed at 01/03/2025 1440   Can you walk indoors, such as around your house? 1.75 filed at 01/03/2025 1440   Can you walk a block or two on level ground?  2.75 filed at 01/03/2025 1440   Can you climb a flight of stairs or walk up a hill? 5.5 filed at 01/03/2025 1440   Can you run a short distance? 8 filed at 01/03/2025 1440   Can you do light work around the house like dusting or washing dishes? 2.7 filed at 01/03/2025 1440   Can you do moderate work around the house like vacuuming, sweeping floors or carrying groceries? 3.5 filed at 01/03/2025 1440   Can you do heavy work around the house like scrubbing floors or lifting and moving heavy furniture?  8 filed at 01/03/2025 1440   Can you do yard work like raking leaves, weeding or pushing a mower? 4.5 filed at 01/03/2025 1440   Can you have sexual relations? 5.25 filed at 01/03/2025 1440   Can you participate in moderate recreational activities like golf, bowling, dancing, doubles tennis or throwing a baseball or football? 6 filed at 01/03/2025 1440   Can you participate in strenous sports like swimming, singles tennis, football, basketball, or skiing? 0 filed at 01/03/2025 1440   DASI SCORE 50.7 filed at 01/03/2025 1440   METS Score (Will be calculated only when all the questions are answered) 9 filed at 01/03/2025 1440          Caprini DVT Assessment      Flowsheet Row Telemedicine Clinical Support from 1/9/2025 in [facility]   Surgical Factors Major surgery planned, lasting over 3 hours filed at  01/09/2025 1544          Modified Frailty Index    No data to display       CHADS2 Stroke Risk  Current as of 52 minutes ago        N/A 3 to 100%: High Risk   2 to < 3%: Medium Risk   0 to < 2%: Low Risk     Last Change: N/A          This score determines the patient's risk of having a stroke if the patient has atrial fibrillation.        This score is not applicable to this patient. Components are not calculated.          Revised Cardiac Risk Index      Flowsheet Row Telemedicine Clinical Support from 1/9/2025 in [facility]   High-Risk Surgery (Intraperitoneal, Intrathoracic,Suprainguinal vascular) 0 filed at 01/09/2025 1521   History of ischemic heart disease (History of MI, History of positive exercuse test, Current chest paint considered due to myocardial ischemia, Use of nitrate therapy, ECG with pathological Q Waves) 0 filed at 01/09/2025 1521   History of congestive heart failure (pulmonary edemia, bilateral rales or S3 gallop, Paroxysmal nocturnal dyspnea, CXR showing pulmonary vascular redistribution) 0 filed at 01/09/2025 1521   History of cerebrovascular disease (Prior TIA or stroke) 0 filed at 01/09/2025 1521   Pre-operative insulin treatment 0 filed at 01/09/2025 1521   Pre-operative creatinine>2 mg/dl 0 filed at 01/09/2025 1521   Revised Cardiac Risk Calculator 0 filed at 01/09/2025 1521          Apfel Simplified Score      Flowsheet Row Telemedicine Clinical Support from 1/9/2025 in [facility]   Smoking status 1 filed at 01/09/2025 1545   History of motion sickness or PONV  0 filed at 01/09/2025 1545   Use of postoperative opioids 1 filed at 01/09/2025 1545   Gender - Female 1=Yes filed at 01/09/2025 1545   Apfel Simplified Score Calculator 3 filed at 01/09/2025 1545          Risk Analysis Index Results This Encounter    No data found in the last 10 encounters.       Prodigy: High Risk  Total Score: 8              Prodigy Age Score           ARISCAT Score for  Postoperative Pulmonary Complications    No data to display       [place] Perioperative Risk for Myocardial Infarction or Cardiac Arrest (ZARA)    No data to display       Recent Results (from the past 12 weeks)   CBC    Collection Time: 01/13/25  9:18 AM   Result Value Ref Range    WBC 2.5 (L) 4.4 - 11.3 x10*3/uL    nRBC 0.0 0.0 - 0.0 /100 WBCs    RBC 3.98 (L) 4.00 - 5.20 x10*6/uL    Hemoglobin 12.0 12.0 - 16.0 g/dL    Hematocrit 36.9 36.0 - 46.0 %    MCV 93 80 - 100 fL    MCH 30.2 26.0 - 34.0 pg    MCHC 32.5 32.0 - 36.0 g/dL    RDW 13.1 11.5 - 14.5 %    Platelets 194 150 - 450 x10*3/uL   Basic Metabolic Panel    Collection Time: 01/13/25  9:18 AM   Result Value Ref Range    Glucose 81 74 - 99 mg/dL    Sodium 141 136 - 145 mmol/L    Potassium 4.4 3.5 - 5.3 mmol/L    Chloride 104 98 - 107 mmol/L    Bicarbonate 28 21 - 32 mmol/L    Anion Gap 13 10 - 20 mmol/L    Urea Nitrogen 16 6 - 23 mg/dL    Creatinine 0.93 0.50 - 1.05 mg/dL    eGFR 70 >60 mL/min/1.73m*2    Calcium 8.9 8.6 - 10.3 mg/dL   Coagulation Screen    Collection Time: 01/13/25  9:18 AM   Result Value Ref Range    Protime 10.9 9.8 - 12.8 seconds    INR 1.0 0.9 - 1.1    aPTT 30 27 - 38 seconds        Diagnostic Results    MR LUMBAR SPINE WO IV CONTRAST; 11/7/2024   IMPRESSION:  Postoperative changes of the lumbar spine L4-5 at both with residual  degenerative changes most prominently at L3-4.     Nuclear Stress Test; 8/21/24  Impression  1. Negative myocardial perfusion study without evidence of inducible  myocardial ischemia or prior infarction.  2. The left ventricle is normal in size.  3. Normal LV wall motion with a post-stress LV EF estimated greater  than 65%.      CT OF THE ABDOMEN AND PELVIS WITH CONTRAST 7/22/2024   Impression:  1. Mild diverticulosis.   2. No acute process.     Assessment and Plan:     Anesthesia  The patient notes anesthesia complications in the past related to PONV.  Morphine or demerol not both mom also, daughter    Neurology  The  patient has no neurological diagnoses or significant findings on chart review, clinical presentation, and evaluation. No grossly apparent neurological perioperative risk. The patient is at increased risk for perioperative stroke secondary to increased age, hyperlipidemia, female gender, general anesthesia, operative time >2.5 hours. Handouts for preoperative brain exercises given to patient.    HEENT/Airway  No diagnoses, significant findings on chart review, clinical presentation, or evaluation. No documented or reported history of airway difficulty.     Cardiovascular  #Afib  -reports this was an isolated event over 10 years ago with no recurrence.   #HLD  -managed on aspirin-instructed to hold 7 days prior to surgery, statin, instructed to continue as prescribed in the perioperative period  #Varicose veins  -seen by Dr. Catherine Mccall in vascular medicine 12/11/24. Compression socks ordered    Cardiology Evaluation  The patient is not followed by cardiology.    She is scheduled for non-cardiac surgery associated with elevated risk. The patient has no major cardiac contraindications to non- cardiac surgery.    METS  The patient's functional capacity capacity is greater than 4 METS.  RCRI  The patient meets 0 RCRI criteria and therefor has a 3.9% risk of major adverse cardiac complications.  ZARA score which indicates a 0.1% risk of intraoperative or 30-day postoperative MACE (major adverse cardiac event).    Pulmonary  #Asthma  -well controlled, Denies any recent URIs, exacerbations or hospitalizations.    The patient is at increased risk of perioperative pulmonary complications secondary to advanced age greater than 60.    ARISCAT 26, Intermediate, 13.3% risk of in-hospital postoperative pulmonary complications  PRODIGY 8, high risk of respiratory depression episode.     Patient given PI sheet for preoperative deep breathing exercises.  Encourage  incentive spirometry in the postoperative period as deemed  necessary.    Endocrine  No diagnoses or significant findings on chart review or clinical presentation and evaluation.    Gastrointestinal  #GERD  -managed on pantoprazole, instructed to continue as prescribed in the perioperative period. No current GI complaints.   #HH  -causes intermittent nausea managed on PPI and carafate.    Genitourinary  #OAB  -managed on oxybutynin, instructed to hold morning of surgery.    Renal  No renal diagnoses or significant findings on chart review or clinical presentation and evaluation. The patient has specific risk factors associated with increased risk of perioperative renal complications related to age greater than 55. Preventative measures include preoperative hydration.    Hematology  No diagnoses or significant findings on chart review or clinical presentation and evaluation.    Caprini score 5, high risk of perioperative VTE.     Patient instructed to ambulate as soon as possible postoperatively to decrease thromboembolic risk. Initiate mechanical DVT prophylaxis as soon as possible and initiate chemical prophylaxis when deemed safe from a bleeding standpoint post surgery.     Transfusion Evaluation  A type and screen was obtained given the likelihood for perioperative transfusion of blood or blood products.    Musculoskeletal  #OA  -s/p prior bilateral knee replacements  #Spinal stenosis  -scheduled for surgery with Dr. Ayers on 1/20/25    ID  No diagnoses or significant findings on chart review or clinical presentation and evaluation. MRSA screening obtained. Prescriptions and instructions given for Hibiclens and Peridex.    -Preoperative medication instructions were provided and reviewed with the patient.  Any additional testing or evaluation was explained to the patient.  NPO Instructions were discussed, and the patient's questions were answered prior to conclusion of this encounter. Patient verbalized understanding of preoperative instructions. After Visit Summary given.       Notified by lab 1/13/25, patient T&S sample mislabeled, will need repeat prior to surgery. Dr. Ayers notified. Ok to have repeat drawn morning of surgery.

## 2025-01-09 NOTE — PREPROCEDURE INSTRUCTIONS
Fasting Guidelines    NPO Instructions:    Do not eat any food after midnight the night before your surgery/procedure.  You may have up to 13.5 ounces of clear liquids until TWO hours before your instructed arrival time to the hospital. This includes water, black tea/coffee, (no milk or cream), apple juice, and/or electrolyte drinks (Gatorade).  You may chew gum up to TWO hours before your surgery/procedure.    Additional Instructions:     We have sent a prescription for Hibiclens soap and Peridex mouth wash to your preferred pharmacy.  If you have not already, Please  your prescription and start using as directed before surgery.  Follow the instruction sheet provided to you at your CPM/PAT appointment.    Avoid herbal supplements, multivitamins and NSAIDS (non-steroidal anti-inflammatory drugs) such as Advil, Aleve, Ibuprofen, Naproxen, Excedrin, Meloxicam or Celebrex for at least 7 days prior to surgery. May take Tylenol as needed.    Avoid tobacco and alcohol products for 24 hours prior to surgery.    CONTACT SURGEON'S OFFICE IF YOU DEVELOP:  * Fever = 100.4 F   * New respiratory symptoms (e.g. cough, shortness of breath, respiratory distress, sore throat)  * Recent loss of taste or smell  *Flu like symptoms such as headache, fatigue or gastrointestinal symptoms  * You develop any open sores, shingles, burning or painful urination   AND/OR:  * You no longer wish to have the surgery.  * Any other personal circumstances change that may lead to the need to cancel or defer this surgery.  *You were admitted to any hospital within one week of your planned procedure.    Seven/Six Days before Surgery:  Review your medication instructions, stop indicated medications    Day of Surgery:  Review your medication instructions, take indicated medications  Wear comfortable loose fitting clothing  Do not use moisturizers, creams, lotions or perfume  All jewelry and valuables should be left at home    Sandeep Miller  [facility]  [phone]-[phone]  [phone]  [email or web address]      Patient Information: Pre-Operative Infection Prevention Measures     Why did I have my nose, under my arms, and groin swabbed?  The purpose of the swab is to identify Staphylococcus aureus inside your nose or on your skin.  The swab was sent to the laboratory for culture.  A positive swab/culture for Staphylococcus aureus is called colonization or carriage.      What is Staphylococcus aureus?  Staphylococcus aureus, also known as “staph”, is a germ found on the skin or in the nose of healthy people.  Sometimes Staphylococcus aureus can get into the body and cause an infection.  This can be minor (such as pimples, boils, or other skin problems).  It might also be serious (such as a blood infection, pneumonia, or a surgical site infection).    What is Staphylococcus aureus colonization or carriage?  Colonization or carriage means that a person has the germ but is not sick from it.  These bacteria can be spread on the hands or when breathing or sneezing.    How is Staphylococcus aureus spread?  It is most often spread by close contact with a person or item that carries it.    What happens if my culture is positive for Staphylococcus aureus?  Your doctor/medical team will use this information to guide any antibiotic treatment which may be necessary.  Regardless of the culture results, we will clean the inside of your nose with a betadine swab just before you have your surgery.      Will I get an infection if I have Staphylococcus aureus in my nose or on my skin?  Anyone can get an infection with Staphylococcus aureus.  However, the best way to reduce your risk of infection is to follow the instructions provided to you for the use of your CHG soap and dental rinse.        Patient Information: Oral/Dental Rinse    What is oral/dental rinse?   It is a mouthwash. It is a way of cleaning the mouth with a  germ-killing solution before your surgery.  The solution contains chlorhexidine, commonly known as CHG.   It is used inside the mouth to kill a bacteria known as Staphylococcus aureus.  Let your doctor know if you are allergic to Chlorhexidine.    Why do I need to use CHG oral/dental rinse?  The CHG oral/dental rinse helps to kill a bacteria in your mouth known as Staphylococcus aureus.     This reduces the risk of infection at the surgical site.      Using your CHG oral/dental rinse  STEPS:  Use your CHG oral/dental rinse after you brush your teeth the night before (at bedtime) and the morning of your surgery.  Follow all directions on your prescription label.    Use the cap on the container to measure 15ml   Swish (gargle if you can) the mouthwash in your mouth for at least 30 seconds, (do not swallow) and spit out  After you use your CHG rinse, do not rinse your mouth with water, drink or eat.  Please refer to the prescription label for the appropriate time to resume oral intake      What side effects might I have using the CHG oral/dental rinse?  CHG rinse will stick to plaque on the teeth.  Brush and floss just before use.  Teeth brushing will help avoid staining of plaque during use.      Patient Information: Home Preoperative Antibacterial Shower      What is a home preoperative antibacterial shower?  This shower is a way of cleaning the skin with a germ-killing solution before surgery.  The solution contains chlorhexidine, commonly known as CHG.  CHG is a skin cleanser with germ-killing ability.  Let your doctor know if you are allergic to chlorhexidine.    Why do I need to take a preoperative antibacterial shower?  Skin is not sterile.  It is best to try to make your skin as free of germs as possible before surgery.  Proper cleansing with a germ-killing soap before surgery can lower the number of germs on your skin.  This helps to reduce the risk of infection at the surgical site.  Following the  instructions listed below will help you prepare your skin for surgery.      How do I use the solution?  Steps:  Begin using your CHG soap 5 days before your scheduled surgery on ________________________.    First, wash and rinse your hair using the CHG soap. Keep CHG soap away from ear canals and eyes.  Rinse completely, do not condition.  Hair extensions should be removed.  Wash your face with your normal soap and rinse.    Apply the CHG solution to a clean wet washcloth.  Turn the water off or move away from the water spray to avoid premature rinsing of the CHG soap as you are applying.   Firmly lather your entire body from the neck down.  Do not use on your face.  Pay special attention to the area(s) where your incision(s) will be located unless they are on your face.  Avoid scrubbing your skin too hard.  The important point is to have the CHG soap sit on your skin for 3 minutes.    When the 3 minutes are up, turn on the water and rinse the CHG solution off your body completely.   DO NOT wash with regular soap after you have used the CHG soap solution  Pat yourself dry with a clean, freshly-laundered towel.  DO NOT apply powders, deodorants, or lotions.  Dress in clean, freshly laundered nightclothes.    Be sure to sleep with clean, freshly laundered sheets.  Be aware that CHG will cause stains on fabrics; if you wash them with bleach after use.  Rinse your washcloth and other linens that have contact with CHG completely.  Use only non-chlorine detergents to launder the items used.   The morning of surgery is the fifth day.  Repeat the above steps and dress in clean comfortable clothing     Whom should I contact if I have any questions regarding the use of CHG soap?  Call the [facility] at [phone] if you have any questions.               Preoperative Brain Exercises    What are brain exercises?  A brain exercise is any activity that  engages your thinking (cognitive) skills.    What types of activities are considered brain exercises?  Jigsaw puzzles, crossword puzzles, word jumble, memory games, word search, and many more.  Many can be found free online or on your phone via a mobile dov.    Why should I do brain exercises before my surgery?  More recent research has shown brain exercise before surgery can lower the risk of postoperative delirium (confusion) which can be especially important for older adults.  Patients who did brain exercises for 5 to 10 hours the days before surgery, cut their risk of postoperative delirium in half up to 1 week after surgery.         [facility]    Preoperative Deep Breathing Exercises    Why it is important to do deep breathing exercises before my surgery?  Deep breathing exercises strengthen your breathing muscles.  This helps you to recover after your surgery and decreases the chance of breathing complications.      How are the deep breathing exercises done?  Sit straight with your back supported.  Breathe in deeply and slowly through your nose. Your lower rib cage should expand and your abdomen may move forward.  Hold that breath for 3 to 5 seconds.  Breathe out through pursed lips, slowly and completely.  Rest and repeat 10 times every hour while awake.  Rest longer if you become dizzy or lightheaded.         Patient and Family Education             Ways You Can Help Prevent Blood Clots             This handout explains some simple things you can do to help prevent blood clots.      Blood clots are blockages that can form in the body's veins. When a blood clot forms in your deep veins, it may be called a deep vein thrombosis, or DVT for short. Blood clots can happen in any part of the body where blood flows, but they are most common in the arms and legs. If a piece of a blood clot breaks free and travels to the lungs, it is called a pulmonary embolus (PE). A PE can be a very  serious problem.         Being in the hospital or having surgery can raise your chances of getting a blood clot because you may not be well enough to move around as much as you normally do.         Ways you can help prevent blood clots in the hospital         Wearing SCDs. SCDs stands for Sequential Compression Devices.   SCDs are special sleeves that wrap around your legs  They attach to a pump that fills them with air to gently squeeze your legs every few minutes.   This helps return the blood in your legs to your heart.   SCDs should only be taken off when walking or bathing.   SCDs may not be comfortable, but they can help save your life.               Wearing compression stockings - if your doctor orders them. These special snug fitting stockings gently squeeze your legs to help blood flow.       Walking. Walking helps move the blood in your legs.   If your doctor says it is ok, try walking the halls at least   5 times a day. Ask us to help you get up, so you don't fall.      Taking any blood thinning medicines your doctor orders.        Page 1 of 2     [facility]; 3/23   Ways you can help prevent blood clots at home       Wearing compression stockings - if your doctor orders them. ? Walking - to help move the blood in your legs.       Taking any blood thinning medicines your doctor orders.      Signs of a blood clot or PE      Tell your doctor or nurse know right away if you have of the problems listed below.    If you are at home, seek medical care right away. Call 911 for chest pain or problems breathing.               Signs of a blood clot (DVT) - such as pain,  swelling, redness or warmth in your arm or leg      Signs of a pulmonary embolism (PE) - such as chest     pain or feeling short of breath

## 2025-01-13 ENCOUNTER — LAB (OUTPATIENT)
Dept: LAB | Facility: LAB | Age: 60
End: 2025-01-13
Payer: COMMERCIAL

## 2025-01-13 DIAGNOSIS — M48.062 SPINAL STENOSIS, LUMBAR REGION WITH NEUROGENIC CLAUDICATION: ICD-10-CM

## 2025-01-13 LAB
ANION GAP SERPL CALC-SCNC: 13 MMOL/L (ref 10–20)
APTT PPP: 30 SECONDS (ref 27–38)
BUN SERPL-MCNC: 16 MG/DL (ref 6–23)
CALCIUM SERPL-MCNC: 8.9 MG/DL (ref 8.6–10.3)
CHLORIDE SERPL-SCNC: 104 MMOL/L (ref 98–107)
CO2 SERPL-SCNC: 28 MMOL/L (ref 21–32)
CREAT SERPL-MCNC: 0.93 MG/DL (ref 0.5–1.05)
EGFRCR SERPLBLD CKD-EPI 2021: 70 ML/MIN/1.73M*2
ERYTHROCYTE [DISTWIDTH] IN BLOOD BY AUTOMATED COUNT: 13.1 % (ref 11.5–14.5)
GLUCOSE SERPL-MCNC: 81 MG/DL (ref 74–99)
HCT VFR BLD AUTO: 36.9 % (ref 36–46)
HGB BLD-MCNC: 12 G/DL (ref 12–16)
INR PPP: 1 (ref 0.9–1.1)
MCH RBC QN AUTO: 30.2 PG (ref 26–34)
MCHC RBC AUTO-ENTMCNC: 32.5 G/DL (ref 32–36)
MCV RBC AUTO: 93 FL (ref 80–100)
NRBC BLD-RTO: 0 /100 WBCS (ref 0–0)
PLATELET # BLD AUTO: 194 X10*3/UL (ref 150–450)
POTASSIUM SERPL-SCNC: 4.4 MMOL/L (ref 3.5–5.3)
PROTHROMBIN TIME: 10.9 SECONDS (ref 9.8–12.8)
RBC # BLD AUTO: 3.98 X10*6/UL (ref 4–5.2)
SODIUM SERPL-SCNC: 141 MMOL/L (ref 136–145)
WBC # BLD AUTO: 2.5 X10*3/UL (ref 4.4–11.3)

## 2025-01-13 PROCEDURE — 80048 BASIC METABOLIC PNL TOTAL CA: CPT

## 2025-01-13 PROCEDURE — 85730 THROMBOPLASTIN TIME PARTIAL: CPT

## 2025-01-13 PROCEDURE — 85027 COMPLETE CBC AUTOMATED: CPT

## 2025-01-13 PROCEDURE — 85610 PROTHROMBIN TIME: CPT

## 2025-01-17 ENCOUNTER — ANESTHESIA EVENT (OUTPATIENT)
Dept: OPERATING ROOM | Facility: HOSPITAL | Age: 60
End: 2025-01-17
Payer: COMMERCIAL

## 2025-01-20 ENCOUNTER — APPOINTMENT (OUTPATIENT)
Dept: RADIOLOGY | Facility: HOSPITAL | Age: 60
End: 2025-01-20
Payer: COMMERCIAL

## 2025-01-20 ENCOUNTER — ANESTHESIA (OUTPATIENT)
Dept: OPERATING ROOM | Facility: HOSPITAL | Age: 60
End: 2025-01-20
Payer: COMMERCIAL

## 2025-01-20 ENCOUNTER — HOSPITAL ENCOUNTER (INPATIENT)
Facility: HOSPITAL | Age: 60
LOS: 3 days | Discharge: HOME | End: 2025-01-23
Attending: ORTHOPAEDIC SURGERY | Admitting: ORTHOPAEDIC SURGERY
Payer: COMMERCIAL

## 2025-01-20 DIAGNOSIS — M48.062 SPINAL STENOSIS, LUMBAR REGION WITH NEUROGENIC CLAUDICATION: Primary | ICD-10-CM

## 2025-01-20 DIAGNOSIS — G89.18 ACUTE POSTOPERATIVE PAIN: ICD-10-CM

## 2025-01-20 DIAGNOSIS — M54.16 LUMBAR RADICULOPATHY: ICD-10-CM

## 2025-01-20 PROCEDURE — 2500000002 HC RX 250 W HCPCS SELF ADMINISTERED DRUGS (ALT 637 FOR MEDICARE OP, ALT 636 FOR OP/ED): Performed by: NURSE ANESTHETIST, CERTIFIED REGISTERED

## 2025-01-20 PROCEDURE — 01NB0ZZ RELEASE LUMBAR NERVE, OPEN APPROACH: ICD-10-PCS | Performed by: ORTHOPAEDIC SURGERY

## 2025-01-20 PROCEDURE — 2500000005 HC RX 250 GENERAL PHARMACY W/O HCPCS: Performed by: ANESTHESIOLOGY

## 2025-01-20 PROCEDURE — 2500000004 HC RX 250 GENERAL PHARMACY W/ HCPCS (ALT 636 FOR OP/ED): Performed by: ANESTHESIOLOGY

## 2025-01-20 PROCEDURE — 7100000002 HC RECOVERY ROOM TIME - EACH INCREMENTAL 1 MINUTE: Performed by: ORTHOPAEDIC SURGERY

## 2025-01-20 PROCEDURE — 2500000005 HC RX 250 GENERAL PHARMACY W/O HCPCS: Performed by: ORTHOPAEDIC SURGERY

## 2025-01-20 PROCEDURE — C1713 ANCHOR/SCREW BN/BN,TIS/BN: HCPCS | Performed by: ORTHOPAEDIC SURGERY

## 2025-01-20 PROCEDURE — 3700000001 HC GENERAL ANESTHESIA TIME - INITIAL BASE CHARGE: Performed by: ORTHOPAEDIC SURGERY

## 2025-01-20 PROCEDURE — 2500000004 HC RX 250 GENERAL PHARMACY W/ HCPCS (ALT 636 FOR OP/ED)

## 2025-01-20 PROCEDURE — 3600000018 HC OR TIME - INITIAL BASE CHARGE - PROCEDURE LEVEL SIX: Performed by: ORTHOPAEDIC SURGERY

## 2025-01-20 PROCEDURE — RXMED WILLOW AMBULATORY MEDICATION CHARGE

## 2025-01-20 PROCEDURE — 2500000001 HC RX 250 WO HCPCS SELF ADMINISTERED DRUGS (ALT 637 FOR MEDICARE OP)

## 2025-01-20 PROCEDURE — 2720000007 HC OR 272 NO HCPCS: Performed by: ORTHOPAEDIC SURGERY

## 2025-01-20 PROCEDURE — 20985 CPTR-ASST DIR MS PX: CPT | Performed by: ORTHOPAEDIC SURGERY

## 2025-01-20 PROCEDURE — A22612 PR ARTHRODESIS POSTERIOR/POSTEROLATERAL LUMBAR: Performed by: NURSE ANESTHETIST, CERTIFIED REGISTERED

## 2025-01-20 PROCEDURE — C1821 INTERSPINOUS IMPLANT: HCPCS | Performed by: ORTHOPAEDIC SURGERY

## 2025-01-20 PROCEDURE — 3600000017 HC OR TIME - EACH INCREMENTAL 1 MINUTE - PROCEDURE LEVEL SIX: Performed by: ORTHOPAEDIC SURGERY

## 2025-01-20 PROCEDURE — 22612 ARTHRD PST TQ 1NTRSPC LUMBAR: CPT | Performed by: ORTHOPAEDIC SURGERY

## 2025-01-20 PROCEDURE — 63047 LAM FACETEC & FORAMOT LUMBAR: CPT | Performed by: ORTHOPAEDIC SURGERY

## 2025-01-20 PROCEDURE — A22612 PR ARTHRODESIS POSTERIOR/POSTEROLATERAL LUMBAR: Performed by: ANESTHESIOLOGY

## 2025-01-20 PROCEDURE — 7100000001 HC RECOVERY ROOM TIME - INITIAL BASE CHARGE: Performed by: ORTHOPAEDIC SURGERY

## 2025-01-20 PROCEDURE — 3700000002 HC GENERAL ANESTHESIA TIME - EACH INCREMENTAL 1 MINUTE: Performed by: ORTHOPAEDIC SURGERY

## 2025-01-20 PROCEDURE — 0SG00AJ FUSION OF LUMBAR VERTEBRAL JOINT WITH INTERBODY FUSION DEVICE, POSTERIOR APPROACH, ANTERIOR COLUMN, OPEN APPROACH: ICD-10-PCS | Performed by: ORTHOPAEDIC SURGERY

## 2025-01-20 PROCEDURE — 2500000004 HC RX 250 GENERAL PHARMACY W/ HCPCS (ALT 636 FOR OP/ED): Performed by: NURSE ANESTHETIST, CERTIFIED REGISTERED

## 2025-01-20 PROCEDURE — 1100000001 HC PRIVATE ROOM DAILY

## 2025-01-20 PROCEDURE — 22840 INSERT SPINE FIXATION DEVICE: CPT | Performed by: ORTHOPAEDIC SURGERY

## 2025-01-20 PROCEDURE — C1762 CONN TISS, HUMAN(INC FASCIA): HCPCS | Performed by: ORTHOPAEDIC SURGERY

## 2025-01-20 PROCEDURE — 2780000003 HC OR 278 NO HCPCS: Performed by: ORTHOPAEDIC SURGERY

## 2025-01-20 PROCEDURE — 97162 PT EVAL MOD COMPLEX 30 MIN: CPT | Mod: GP

## 2025-01-20 PROCEDURE — 97116 GAIT TRAINING THERAPY: CPT | Mod: GP

## 2025-01-20 PROCEDURE — 0SP004Z REMOVAL OF INTERNAL FIXATION DEVICE FROM LUMBAR VERTEBRAL JOINT, OPEN APPROACH: ICD-10-PCS

## 2025-01-20 PROCEDURE — 2500000002 HC RX 250 W HCPCS SELF ADMINISTERED DRUGS (ALT 637 FOR MEDICARE OP, ALT 636 FOR OP/ED)

## 2025-01-20 DEVICE — SET SCREW, 5.5, TI NS BRK OFF: Type: IMPLANTABLE DEVICE | Site: BACK | Status: FUNCTIONAL

## 2025-01-20 DEVICE — INFUSE BMP SMALL: Type: IMPLANTABLE DEVICE | Site: BACK | Status: FUNCTIONAL

## 2025-01-20 DEVICE — ROD, 5.5 X 60 CVD TI SOLERA: Type: IMPLANTABLE DEVICE | Site: BACK | Status: FUNCTIONAL

## 2025-01-20 DEVICE — SCREW, MAS 6.5 X 50 CC: Type: IMPLANTABLE DEVICE | Site: BACK | Status: FUNCTIONAL

## 2025-01-20 DEVICE — BONE, CHIP, CANCELLOUS, FREEZE DRIED, ASEPTIC, 1.7-10 MM, 60 CC: Type: IMPLANTABLE DEVICE | Site: BACK | Status: FUNCTIONAL

## 2025-01-20 DEVICE — SCREW, MAS 5.5 X 50 CC SOLERA: Type: IMPLANTABLE DEVICE | Site: BACK | Status: FUNCTIONAL

## 2025-01-20 DEVICE — SCREW, MAS 6.5 X 45 CC SOLERA: Type: IMPLANTABLE DEVICE | Site: BACK | Status: FUNCTIONAL

## 2025-01-20 DEVICE — SCREW, SOLERA 5.5 MAS, 7.5X50: Type: IMPLANTABLE DEVICE | Site: BACK | Status: FUNCTIONAL

## 2025-01-20 RX ORDER — OXYBUTYNIN CHLORIDE 5 MG/1
5 TABLET ORAL 2 TIMES DAILY
Status: DISCONTINUED | OUTPATIENT
Start: 2025-01-20 | End: 2025-01-23 | Stop reason: HOSPADM

## 2025-01-20 RX ORDER — ACETAMINOPHEN 10 MG/ML
INJECTION, SOLUTION INTRAVENOUS AS NEEDED
Status: DISCONTINUED | OUTPATIENT
Start: 2025-01-20 | End: 2025-01-20

## 2025-01-20 RX ORDER — ONDANSETRON 4 MG/1
4 TABLET, ORALLY DISINTEGRATING ORAL EVERY 8 HOURS PRN
Status: DISCONTINUED | OUTPATIENT
Start: 2025-01-20 | End: 2025-01-23 | Stop reason: HOSPADM

## 2025-01-20 RX ORDER — DEXTROSE 50 % IN WATER (D50W) INTRAVENOUS SYRINGE
12.5
Status: DISCONTINUED | OUTPATIENT
Start: 2025-01-20 | End: 2025-01-23 | Stop reason: HOSPADM

## 2025-01-20 RX ORDER — NALOXONE HYDROCHLORIDE 0.4 MG/ML
0.2 INJECTION, SOLUTION INTRAMUSCULAR; INTRAVENOUS; SUBCUTANEOUS EVERY 5 MIN PRN
Status: DISCONTINUED | OUTPATIENT
Start: 2025-01-20 | End: 2025-01-23 | Stop reason: HOSPADM

## 2025-01-20 RX ORDER — SUCRALFATE 1 G/1
1 TABLET ORAL
Status: DISCONTINUED | OUTPATIENT
Start: 2025-01-20 | End: 2025-01-23 | Stop reason: HOSPADM

## 2025-01-20 RX ORDER — HYDROMORPHONE HYDROCHLORIDE 1 MG/ML
0.4 INJECTION, SOLUTION INTRAMUSCULAR; INTRAVENOUS; SUBCUTANEOUS EVERY 2 HOUR PRN
Status: DISCONTINUED | OUTPATIENT
Start: 2025-01-20 | End: 2025-01-23 | Stop reason: HOSPADM

## 2025-01-20 RX ORDER — ACETAMINOPHEN 325 MG/1
975 TABLET ORAL EVERY 8 HOURS
Status: DISCONTINUED | OUTPATIENT
Start: 2025-01-20 | End: 2025-01-23 | Stop reason: HOSPADM

## 2025-01-20 RX ORDER — METHOCARBAMOL 500 MG/1
TABLET, FILM COATED ORAL
Qty: 60 TABLET | Refills: 2 | Status: SHIPPED | OUTPATIENT
Start: 2025-01-20 | End: 2025-01-23

## 2025-01-20 RX ORDER — SCOPOLAMINE 1 MG/3D
PATCH, EXTENDED RELEASE TRANSDERMAL AS NEEDED
Status: DISCONTINUED | OUTPATIENT
Start: 2025-01-20 | End: 2025-01-20

## 2025-01-20 RX ORDER — OXYCODONE HYDROCHLORIDE 5 MG/1
10 TABLET ORAL EVERY 4 HOURS PRN
Status: DISCONTINUED | OUTPATIENT
Start: 2025-01-20 | End: 2025-01-23 | Stop reason: HOSPADM

## 2025-01-20 RX ORDER — APREPITANT 40 MG/1
CAPSULE ORAL AS NEEDED
Status: DISCONTINUED | OUTPATIENT
Start: 2025-01-20 | End: 2025-01-20

## 2025-01-20 RX ORDER — DIPHENHYDRAMINE HCL 25 MG
50 CAPSULE ORAL EVERY 6 HOURS PRN
Status: DISCONTINUED | OUTPATIENT
Start: 2025-01-20 | End: 2025-01-23 | Stop reason: HOSPADM

## 2025-01-20 RX ORDER — ONDANSETRON HYDROCHLORIDE 2 MG/ML
INJECTION, SOLUTION INTRAVENOUS AS NEEDED
Status: DISCONTINUED | OUTPATIENT
Start: 2025-01-20 | End: 2025-01-20

## 2025-01-20 RX ORDER — POLYETHYLENE GLYCOL 3350 17 G/17G
17 POWDER, FOR SOLUTION ORAL DAILY
Status: DISCONTINUED | OUTPATIENT
Start: 2025-01-20 | End: 2025-01-23 | Stop reason: HOSPADM

## 2025-01-20 RX ORDER — POLYETHYLENE GLYCOL 3350 17 G/17G
17 POWDER, FOR SOLUTION ORAL 2 TIMES DAILY PRN
Qty: 510 G | Refills: 0 | Status: SHIPPED | OUTPATIENT
Start: 2025-01-20 | End: 2025-01-23

## 2025-01-20 RX ORDER — METHOCARBAMOL 500 MG/1
1000 TABLET, FILM COATED ORAL 3 TIMES DAILY
Status: DISCONTINUED | OUTPATIENT
Start: 2025-01-20 | End: 2025-01-23 | Stop reason: HOSPADM

## 2025-01-20 RX ORDER — MONTELUKAST SODIUM 5 MG/1
5 TABLET, CHEWABLE ORAL NIGHTLY
Status: DISCONTINUED | OUTPATIENT
Start: 2025-01-20 | End: 2025-01-23 | Stop reason: HOSPADM

## 2025-01-20 RX ORDER — PROPOFOL 10 MG/ML
INJECTION, EMULSION INTRAVENOUS AS NEEDED
Status: DISCONTINUED | OUTPATIENT
Start: 2025-01-20 | End: 2025-01-20

## 2025-01-20 RX ORDER — ONDANSETRON HYDROCHLORIDE 2 MG/ML
4 INJECTION, SOLUTION INTRAVENOUS ONCE AS NEEDED
Status: COMPLETED | OUTPATIENT
Start: 2025-01-20 | End: 2025-01-20

## 2025-01-20 RX ORDER — SODIUM CHLORIDE, SODIUM LACTATE, POTASSIUM CHLORIDE, CALCIUM CHLORIDE 600; 310; 30; 20 MG/100ML; MG/100ML; MG/100ML; MG/100ML
100 INJECTION, SOLUTION INTRAVENOUS CONTINUOUS
Status: ACTIVE | OUTPATIENT
Start: 2025-01-20 | End: 2025-01-21

## 2025-01-20 RX ORDER — ROSUVASTATIN CALCIUM 5 MG/1
5 TABLET, COATED ORAL DAILY
Status: DISCONTINUED | OUTPATIENT
Start: 2025-01-21 | End: 2025-01-23 | Stop reason: HOSPADM

## 2025-01-20 RX ORDER — FENTANYL CITRATE 50 UG/ML
INJECTION, SOLUTION INTRAMUSCULAR; INTRAVENOUS AS NEEDED
Status: DISCONTINUED | OUTPATIENT
Start: 2025-01-20 | End: 2025-01-20

## 2025-01-20 RX ORDER — OXYCODONE HYDROCHLORIDE 5 MG/1
5 TABLET ORAL EVERY 4 HOURS PRN
Status: DISCONTINUED | OUTPATIENT
Start: 2025-01-20 | End: 2025-01-23 | Stop reason: HOSPADM

## 2025-01-20 RX ORDER — HYDROMORPHONE HYDROCHLORIDE 0.2 MG/ML
0.2 INJECTION INTRAMUSCULAR; INTRAVENOUS; SUBCUTANEOUS EVERY 5 MIN PRN
Status: DISCONTINUED | OUTPATIENT
Start: 2025-01-20 | End: 2025-01-20 | Stop reason: HOSPADM

## 2025-01-20 RX ORDER — PHENYLEPHRINE HCL IN 0.9% NACL 0.4MG/10ML
SYRINGE (ML) INTRAVENOUS AS NEEDED
Status: DISCONTINUED | OUTPATIENT
Start: 2025-01-20 | End: 2025-01-20

## 2025-01-20 RX ORDER — DROPERIDOL 2.5 MG/ML
0.62 INJECTION, SOLUTION INTRAMUSCULAR; INTRAVENOUS ONCE AS NEEDED
Status: COMPLETED | OUTPATIENT
Start: 2025-01-20 | End: 2025-01-20

## 2025-01-20 RX ORDER — SODIUM CHLORIDE, SODIUM LACTATE, POTASSIUM CHLORIDE, CALCIUM CHLORIDE 600; 310; 30; 20 MG/100ML; MG/100ML; MG/100ML; MG/100ML
INJECTION, SOLUTION INTRAVENOUS CONTINUOUS PRN
Status: DISCONTINUED | OUTPATIENT
Start: 2025-01-20 | End: 2025-01-20

## 2025-01-20 RX ORDER — SODIUM CHLORIDE, SODIUM LACTATE, POTASSIUM CHLORIDE, CALCIUM CHLORIDE 600; 310; 30; 20 MG/100ML; MG/100ML; MG/100ML; MG/100ML
100 INJECTION, SOLUTION INTRAVENOUS CONTINUOUS
Status: ACTIVE | OUTPATIENT
Start: 2025-01-20 | End: 2025-01-22

## 2025-01-20 RX ORDER — KETOROLAC TROMETHAMINE 15 MG/ML
15 INJECTION, SOLUTION INTRAMUSCULAR; INTRAVENOUS EVERY 6 HOURS SCHEDULED
Status: DISCONTINUED | OUTPATIENT
Start: 2025-01-20 | End: 2025-01-21

## 2025-01-20 RX ORDER — CEFAZOLIN 1 G/1
INJECTION, POWDER, FOR SOLUTION INTRAVENOUS AS NEEDED
Status: DISCONTINUED | OUTPATIENT
Start: 2025-01-20 | End: 2025-01-20

## 2025-01-20 RX ORDER — OXYCODONE HYDROCHLORIDE 5 MG/1
5 TABLET ORAL EVERY 4 HOURS PRN
Qty: 42 TABLET | Refills: 0 | Status: SHIPPED | OUTPATIENT
Start: 2025-01-20 | End: 2025-01-23

## 2025-01-20 RX ORDER — GLYCOPYRROLATE 0.2 MG/ML
INJECTION INTRAMUSCULAR; INTRAVENOUS AS NEEDED
Status: DISCONTINUED | OUTPATIENT
Start: 2025-01-20 | End: 2025-01-20

## 2025-01-20 RX ORDER — HYDROMORPHONE HYDROCHLORIDE 1 MG/ML
INJECTION, SOLUTION INTRAMUSCULAR; INTRAVENOUS; SUBCUTANEOUS AS NEEDED
Status: DISCONTINUED | OUTPATIENT
Start: 2025-01-20 | End: 2025-01-20

## 2025-01-20 RX ORDER — METHOCARBAMOL 100 MG/ML
1000 INJECTION, SOLUTION INTRAMUSCULAR; INTRAVENOUS ONCE
Status: COMPLETED | OUTPATIENT
Start: 2025-01-20 | End: 2025-01-20

## 2025-01-20 RX ORDER — BACITRACIN 500 [USP'U]/G
OINTMENT TOPICAL AS NEEDED
Status: DISCONTINUED | OUTPATIENT
Start: 2025-01-20 | End: 2025-01-20 | Stop reason: HOSPADM

## 2025-01-20 RX ORDER — CEFAZOLIN SODIUM 2 G/100ML
2 INJECTION, SOLUTION INTRAVENOUS EVERY 8 HOURS
Status: COMPLETED | OUTPATIENT
Start: 2025-01-20 | End: 2025-01-22

## 2025-01-20 RX ORDER — PROCHLORPERAZINE MALEATE 10 MG
10 TABLET ORAL EVERY 6 HOURS PRN
Status: DISCONTINUED | OUTPATIENT
Start: 2025-01-20 | End: 2025-01-23 | Stop reason: HOSPADM

## 2025-01-20 RX ORDER — LABETALOL HYDROCHLORIDE 5 MG/ML
5 INJECTION, SOLUTION INTRAVENOUS ONCE AS NEEDED
Status: DISCONTINUED | OUTPATIENT
Start: 2025-01-20 | End: 2025-01-20 | Stop reason: HOSPADM

## 2025-01-20 RX ORDER — ONDANSETRON HYDROCHLORIDE 2 MG/ML
4 INJECTION, SOLUTION INTRAVENOUS EVERY 8 HOURS PRN
Status: DISCONTINUED | OUTPATIENT
Start: 2025-01-20 | End: 2025-01-23 | Stop reason: HOSPADM

## 2025-01-20 RX ORDER — PANTOPRAZOLE SODIUM 20 MG/1
20 TABLET, DELAYED RELEASE ORAL
Status: DISCONTINUED | OUTPATIENT
Start: 2025-01-21 | End: 2025-01-23 | Stop reason: HOSPADM

## 2025-01-20 RX ORDER — ACETAMINOPHEN 500 MG
1000 TABLET ORAL EVERY 8 HOURS
Qty: 84 TABLET | Refills: 0 | Status: SHIPPED | OUTPATIENT
Start: 2025-01-20 | End: 2025-01-23

## 2025-01-20 RX ORDER — BISACODYL 5 MG
10 TABLET, DELAYED RELEASE (ENTERIC COATED) ORAL DAILY PRN
Status: DISCONTINUED | OUTPATIENT
Start: 2025-01-20 | End: 2025-01-23 | Stop reason: HOSPADM

## 2025-01-20 RX ORDER — ROCURONIUM BROMIDE 10 MG/ML
INJECTION, SOLUTION INTRAVENOUS AS NEEDED
Status: DISCONTINUED | OUTPATIENT
Start: 2025-01-20 | End: 2025-01-20

## 2025-01-20 RX ORDER — PROCHLORPERAZINE EDISYLATE 5 MG/ML
10 INJECTION INTRAMUSCULAR; INTRAVENOUS EVERY 6 HOURS PRN
Status: DISCONTINUED | OUTPATIENT
Start: 2025-01-20 | End: 2025-01-23 | Stop reason: HOSPADM

## 2025-01-20 RX ORDER — DEXAMETHASONE SODIUM PHOSPHATE 10 MG/ML
10 INJECTION INTRAMUSCULAR; INTRAVENOUS EVERY 8 HOURS SCHEDULED
Status: COMPLETED | OUTPATIENT
Start: 2025-01-20 | End: 2025-01-21

## 2025-01-20 RX ORDER — PROCHLORPERAZINE 25 MG/1
25 SUPPOSITORY RECTAL EVERY 12 HOURS PRN
Status: DISCONTINUED | OUTPATIENT
Start: 2025-01-20 | End: 2025-01-23 | Stop reason: HOSPADM

## 2025-01-20 RX ORDER — LIDOCAINE HCL/PF 100 MG/5ML
SYRINGE (ML) INTRAVENOUS AS NEEDED
Status: DISCONTINUED | OUTPATIENT
Start: 2025-01-20 | End: 2025-01-20

## 2025-01-20 RX ADMIN — Medication 2 L/MIN: at 10:45

## 2025-01-20 RX ADMIN — LIDOCAINE HYDROCHLORIDE 100 MG: 20 INJECTION INTRAVENOUS at 07:48

## 2025-01-20 RX ADMIN — METHOCARBAMOL 1000 MG: 1000 INJECTION, SOLUTION INTRAMUSCULAR; INTRAVENOUS at 10:53

## 2025-01-20 RX ADMIN — Medication 40 MCG: at 07:48

## 2025-01-20 RX ADMIN — ONDANSETRON 4 MG: 2 INJECTION INTRAMUSCULAR; INTRAVENOUS at 09:52

## 2025-01-20 RX ADMIN — HYDROMORPHONE HYDROCHLORIDE 0.5 MG: 1 INJECTION, SOLUTION INTRAMUSCULAR; INTRAVENOUS; SUBCUTANEOUS at 08:25

## 2025-01-20 RX ADMIN — CEFAZOLIN SODIUM 2 G: 2 INJECTION, SOLUTION INTRAVENOUS at 21:59

## 2025-01-20 RX ADMIN — APREPITANT 40 MG: 40 CAPSULE ORAL at 07:14

## 2025-01-20 RX ADMIN — SUCRALFATE 1 G: 1 TABLET ORAL at 18:58

## 2025-01-20 RX ADMIN — ROCURONIUM BROMIDE 70 MG: 10 INJECTION INTRAVENOUS at 07:48

## 2025-01-20 RX ADMIN — ROCURONIUM BROMIDE 30 MG: 10 INJECTION INTRAVENOUS at 08:47

## 2025-01-20 RX ADMIN — CEFAZOLIN SODIUM 2 G: 2 INJECTION, SOLUTION INTRAVENOUS at 14:08

## 2025-01-20 RX ADMIN — PROPOFOL 120 MG: 10 INJECTION, EMULSION INTRAVENOUS at 07:50

## 2025-01-20 RX ADMIN — ROCURONIUM BROMIDE 20 MG: 10 INJECTION INTRAVENOUS at 09:43

## 2025-01-20 RX ADMIN — DROPERIDOL 0.62 MG: 2.5 INJECTION, SOLUTION INTRAMUSCULAR; INTRAVENOUS at 10:42

## 2025-01-20 RX ADMIN — CEFAZOLIN 2 G: 1 INJECTION, POWDER, FOR SOLUTION INTRAMUSCULAR; INTRAVENOUS at 08:06

## 2025-01-20 RX ADMIN — PROCHLORPERAZINE EDISYLATE 10 MG: 5 INJECTION INTRAMUSCULAR; INTRAVENOUS at 14:07

## 2025-01-20 RX ADMIN — OXYBUTYNIN CHLORIDE 5 MG: 5 TABLET ORAL at 21:59

## 2025-01-20 RX ADMIN — FENTANYL CITRATE 100 MCG: 50 INJECTION, SOLUTION INTRAMUSCULAR; INTRAVENOUS at 07:48

## 2025-01-20 RX ADMIN — KETOROLAC TROMETHAMINE 15 MG: 15 INJECTION, SOLUTION INTRAMUSCULAR; INTRAVENOUS at 13:15

## 2025-01-20 RX ADMIN — DEXAMETHASONE SODIUM PHOSPHATE 10 MG: 4 INJECTION INTRA-ARTICULAR; INTRALESIONAL; INTRAMUSCULAR; INTRAVENOUS; SOFT TISSUE at 08:06

## 2025-01-20 RX ADMIN — GLYCOPYRROLATE 0.2 MG: 0.2 INJECTION, SOLUTION INTRAMUSCULAR; INTRAVENOUS at 07:48

## 2025-01-20 RX ADMIN — KETOROLAC TROMETHAMINE 15 MG: 15 INJECTION, SOLUTION INTRAMUSCULAR; INTRAVENOUS at 18:58

## 2025-01-20 RX ADMIN — SODIUM CHLORIDE, POTASSIUM CHLORIDE, SODIUM LACTATE AND CALCIUM CHLORIDE 100 ML/HR: 600; 310; 30; 20 INJECTION, SOLUTION INTRAVENOUS at 14:23

## 2025-01-20 RX ADMIN — POLYETHYLENE GLYCOL 3350 17 G: 17 POWDER, FOR SOLUTION ORAL at 14:11

## 2025-01-20 RX ADMIN — ONDANSETRON 4 MG: 2 INJECTION INTRAMUSCULAR; INTRAVENOUS at 10:30

## 2025-01-20 RX ADMIN — PROPOFOL 80 MG: 10 INJECTION, EMULSION INTRAVENOUS at 07:48

## 2025-01-20 RX ADMIN — SODIUM CHLORIDE, POTASSIUM CHLORIDE, SODIUM LACTATE AND CALCIUM CHLORIDE: 600; 310; 30; 20 INJECTION, SOLUTION INTRAVENOUS at 09:43

## 2025-01-20 RX ADMIN — METHOCARBAMOL 1000 MG: 500 TABLET ORAL at 14:08

## 2025-01-20 RX ADMIN — HYDROMORPHONE HYDROCHLORIDE 0.5 MG: 0.5 INJECTION, SOLUTION INTRAMUSCULAR; INTRAVENOUS; SUBCUTANEOUS at 10:46

## 2025-01-20 RX ADMIN — DEXAMETHASONE SODIUM PHOSPHATE 10 MG: 10 INJECTION INTRAMUSCULAR; INTRAVENOUS at 14:08

## 2025-01-20 RX ADMIN — DEXAMETHASONE SODIUM PHOSPHATE 10 MG: 10 INJECTION INTRAMUSCULAR; INTRAVENOUS at 21:59

## 2025-01-20 RX ADMIN — SUGAMMADEX 600 MG: 100 INJECTION, SOLUTION INTRAVENOUS at 10:02

## 2025-01-20 RX ADMIN — ACETAMINOPHEN 975 MG: 325 TABLET ORAL at 21:59

## 2025-01-20 RX ADMIN — METHOCARBAMOL 1000 MG: 500 TABLET ORAL at 21:59

## 2025-01-20 RX ADMIN — HYDROMORPHONE HYDROCHLORIDE 0.5 MG: 1 INJECTION, SOLUTION INTRAMUSCULAR; INTRAVENOUS; SUBCUTANEOUS at 09:30

## 2025-01-20 RX ADMIN — SODIUM CHLORIDE, POTASSIUM CHLORIDE, SODIUM LACTATE AND CALCIUM CHLORIDE: 600; 310; 30; 20 INJECTION, SOLUTION INTRAVENOUS at 07:33

## 2025-01-20 RX ADMIN — ACETAMINOPHEN 1000 MG: 10 INJECTION, SOLUTION INTRAVENOUS at 10:25

## 2025-01-20 RX ADMIN — SODIUM CHLORIDE, POTASSIUM CHLORIDE, SODIUM LACTATE AND CALCIUM CHLORIDE 100 ML/HR: 600; 310; 30; 20 INJECTION, SOLUTION INTRAVENOUS at 11:02

## 2025-01-20 RX ADMIN — SCOPOLAMINE 1 PATCH: 1.5 PATCH, EXTENDED RELEASE TRANSDERMAL at 07:20

## 2025-01-20 SDOH — SOCIAL STABILITY: SOCIAL NETWORK: HOW OFTEN DO YOU ATTEND MEETINGS OF THE CLUBS OR ORGANIZATIONS YOU BELONG TO?: NEVER

## 2025-01-20 SDOH — SOCIAL STABILITY: SOCIAL INSECURITY: WITHIN THE LAST YEAR, HAVE YOU BEEN HUMILIATED OR EMOTIONALLY ABUSED IN OTHER WAYS BY YOUR PARTNER OR EX-PARTNER?: NO

## 2025-01-20 SDOH — ECONOMIC STABILITY: INCOME INSECURITY: IN THE PAST 12 MONTHS HAS THE ELECTRIC, GAS, OIL, OR WATER COMPANY THREATENED TO SHUT OFF SERVICES IN YOUR HOME?: NO

## 2025-01-20 SDOH — SOCIAL STABILITY: SOCIAL INSECURITY: DO YOU FEEL ANYONE HAS EXPLOITED OR TAKEN ADVANTAGE OF YOU FINANCIALLY OR OF YOUR PERSONAL PROPERTY?: NO

## 2025-01-20 SDOH — HEALTH STABILITY: MENTAL HEALTH
DO YOU FEEL STRESS - TENSE, RESTLESS, NERVOUS, OR ANXIOUS, OR UNABLE TO SLEEP AT NIGHT BECAUSE YOUR MIND IS TROUBLED ALL THE TIME - THESE DAYS?: NOT AT ALL

## 2025-01-20 SDOH — ECONOMIC STABILITY: FOOD INSECURITY: WITHIN THE PAST 12 MONTHS, THE FOOD YOU BOUGHT JUST DIDN'T LAST AND YOU DIDN'T HAVE MONEY TO GET MORE.: NEVER TRUE

## 2025-01-20 SDOH — SOCIAL STABILITY: SOCIAL INSECURITY: ARE YOU MARRIED, WIDOWED, DIVORCED, SEPARATED, NEVER MARRIED, OR LIVING WITH A PARTNER?: MARRIED

## 2025-01-20 SDOH — SOCIAL STABILITY: SOCIAL INSECURITY
WITHIN THE LAST YEAR, HAVE YOU BEEN RAPED OR FORCED TO HAVE ANY KIND OF SEXUAL ACTIVITY BY YOUR PARTNER OR EX-PARTNER?: NO

## 2025-01-20 SDOH — SOCIAL STABILITY: SOCIAL INSECURITY: HAVE YOU HAD THOUGHTS OF HARMING ANYONE ELSE?: NO

## 2025-01-20 SDOH — SOCIAL STABILITY: SOCIAL INSECURITY
WITHIN THE LAST YEAR, HAVE YOU BEEN KICKED, HIT, SLAPPED, OR OTHERWISE PHYSICALLY HURT BY YOUR PARTNER OR EX-PARTNER?: NO

## 2025-01-20 SDOH — ECONOMIC STABILITY: FOOD INSECURITY: WITHIN THE PAST 12 MONTHS, YOU WORRIED THAT YOUR FOOD WOULD RUN OUT BEFORE YOU GOT THE MONEY TO BUY MORE.: NEVER TRUE

## 2025-01-20 SDOH — SOCIAL STABILITY: SOCIAL INSECURITY: HAVE YOU HAD ANY THOUGHTS OF HARMING ANYONE ELSE?: NO

## 2025-01-20 SDOH — SOCIAL STABILITY: SOCIAL NETWORK: IN A TYPICAL WEEK, HOW MANY TIMES DO YOU TALK ON THE PHONE WITH FAMILY, FRIENDS, OR NEIGHBORS?: NEVER

## 2025-01-20 SDOH — SOCIAL STABILITY: SOCIAL NETWORK
DO YOU BELONG TO ANY CLUBS OR ORGANIZATIONS SUCH AS CHURCH GROUPS, UNIONS, FRATERNAL OR ATHLETIC GROUPS, OR SCHOOL GROUPS?: NO

## 2025-01-20 SDOH — SOCIAL STABILITY: SOCIAL INSECURITY: ABUSE: ADULT

## 2025-01-20 SDOH — ECONOMIC STABILITY: HOUSING INSECURITY: IN THE PAST 12 MONTHS, HOW MANY TIMES HAVE YOU MOVED WHERE YOU WERE LIVING?: 0

## 2025-01-20 SDOH — SOCIAL STABILITY: SOCIAL INSECURITY: WITHIN THE LAST YEAR, HAVE YOU BEEN AFRAID OF YOUR PARTNER OR EX-PARTNER?: NO

## 2025-01-20 SDOH — SOCIAL STABILITY: SOCIAL INSECURITY: ARE THERE ANY APPARENT SIGNS OF INJURIES/BEHAVIORS THAT COULD BE RELATED TO ABUSE/NEGLECT?: NO

## 2025-01-20 SDOH — SOCIAL STABILITY: SOCIAL INSECURITY: DOES ANYONE TRY TO KEEP YOU FROM HAVING/CONTACTING OTHER FRIENDS OR DOING THINGS OUTSIDE YOUR HOME?: NO

## 2025-01-20 SDOH — SOCIAL STABILITY: SOCIAL INSECURITY: WERE YOU ABLE TO COMPLETE ALL THE BEHAVIORAL HEALTH SCREENINGS?: YES

## 2025-01-20 SDOH — SOCIAL STABILITY: SOCIAL INSECURITY: HAS ANYONE EVER THREATENED TO HURT YOUR FAMILY OR YOUR PETS?: NO

## 2025-01-20 SDOH — SOCIAL STABILITY: SOCIAL NETWORK: HOW OFTEN DO YOU ATTEND CHURCH OR RELIGIOUS SERVICES?: NEVER

## 2025-01-20 SDOH — SOCIAL STABILITY: SOCIAL INSECURITY: ARE YOU OR HAVE YOU BEEN THREATENED OR ABUSED PHYSICALLY, EMOTIONALLY, OR SEXUALLY BY ANYONE?: NO

## 2025-01-20 SDOH — SOCIAL STABILITY: SOCIAL NETWORK: HOW OFTEN DO YOU GET TOGETHER WITH FRIENDS OR RELATIVES?: NEVER

## 2025-01-20 SDOH — SOCIAL STABILITY: SOCIAL INSECURITY: DO YOU FEEL UNSAFE GOING BACK TO THE PLACE WHERE YOU ARE LIVING?: NO

## 2025-01-20 ASSESSMENT — COGNITIVE AND FUNCTIONAL STATUS - GENERAL
MOBILITY SCORE: 18
PATIENT BASELINE BEDBOUND: NO
HELP NEEDED FOR BATHING: A LITTLE
TURNING FROM BACK TO SIDE WHILE IN FLAT BAD: A LITTLE
WALKING IN HOSPITAL ROOM: A LITTLE
TURNING FROM BACK TO SIDE WHILE IN FLAT BAD: A LITTLE
MOBILITY SCORE: 18
MOVING TO AND FROM BED TO CHAIR: A LITTLE
DRESSING REGULAR UPPER BODY CLOTHING: A LOT
DAILY ACTIVITIY SCORE: 20
CLIMB 3 TO 5 STEPS WITH RAILING: A LITTLE
TOILETING: A LITTLE
MOVING TO AND FROM BED TO CHAIR: A LITTLE
DRESSING REGULAR LOWER BODY CLOTHING: A LOT
WALKING IN HOSPITAL ROOM: A LITTLE
MOVING FROM LYING ON BACK TO SITTING ON SIDE OF FLAT BED WITH BEDRAILS: A LITTLE
CLIMB 3 TO 5 STEPS WITH RAILING: A LITTLE
STANDING UP FROM CHAIR USING ARMS: A LITTLE
WALKING IN HOSPITAL ROOM: A LITTLE
CLIMB 3 TO 5 STEPS WITH RAILING: A LOT
MOBILITY SCORE: 17
STANDING UP FROM CHAIR USING ARMS: A LITTLE
MOVING FROM LYING ON BACK TO SITTING ON SIDE OF FLAT BED WITH BEDRAILS: A LITTLE
HELP NEEDED FOR BATHING: A LITTLE
MOVING FROM LYING ON BACK TO SITTING ON SIDE OF FLAT BED WITH BEDRAILS: A LITTLE
DRESSING REGULAR LOWER BODY CLOTHING: A LOT
STANDING UP FROM CHAIR USING ARMS: A LITTLE
MOVING TO AND FROM BED TO CHAIR: A LITTLE
TOILETING: A LITTLE
TURNING FROM BACK TO SIDE WHILE IN FLAT BAD: A LITTLE
DAILY ACTIVITIY SCORE: 18

## 2025-01-20 ASSESSMENT — LIFESTYLE VARIABLES
AUDIT-C TOTAL SCORE: 3
HOW OFTEN DURING THE LAST YEAR HAVE YOU NEEDED AN ALCOHOLIC DRINK FIRST THING IN THE MORNING TO GET YOURSELF GOING AFTER A NIGHT OF HEAVY DRINKING: NEVER
HOW OFTEN DURING THE LAST YEAR HAVE YOU BEEN UNABLE TO REMEMBER WHAT HAPPENED THE NIGHT BEFORE BECAUSE YOU HAD BEEN DRINKING: NEVER
HOW OFTEN DURING THE LAST YEAR HAVE YOU HAD A FEELING OF GUILT OR REMORSE AFTER DRINKING: NEVER
SKIP TO QUESTIONS 9-10: 1
HOW OFTEN DURING THE LAST YEAR HAVE YOU FOUND THAT YOU WERE NOT ABLE TO STOP DRINKING ONCE YOU HAD STARTED: NEVER
HAVE YOU OR SOMEONE ELSE BEEN INJURED AS A RESULT OF YOUR DRINKING: NO
HOW OFTEN DURING THE LAST YEAR HAVE YOU FAILED TO DO WHAT WAS NORMALLY EXPECTED FROM YOU BECAUSE OF DRINKING: NEVER
AUDIT TOTAL SCORE: 3
PRESCIPTION_ABUSE_PAST_12_MONTHS: NO
HOW OFTEN DO YOU HAVE A DRINK CONTAINING ALCOHOL: 2-3 TIMES A WEEK
AUDIT TOTAL SCORE: 0
AUDIT-C TOTAL SCORE: 3
HOW OFTEN DO YOU HAVE 6 OR MORE DRINKS ON ONE OCCASION: NEVER
HOW MANY STANDARD DRINKS CONTAINING ALCOHOL DO YOU HAVE ON A TYPICAL DAY: 1 OR 2
HAS A RELATIVE, FRIEND, DOCTOR, OR ANOTHER HEALTH PROFESSIONAL EXPRESSED CONCERN ABOUT YOUR DRINKING OR SUGGESTED YOU CUT DOWN: NO
SUBSTANCE_ABUSE_PAST_12_MONTHS: NO

## 2025-01-20 ASSESSMENT — COLUMBIA-SUICIDE SEVERITY RATING SCALE - C-SSRS
2. HAVE YOU ACTUALLY HAD ANY THOUGHTS OF KILLING YOURSELF?: NO
1. IN THE PAST MONTH, HAVE YOU WISHED YOU WERE DEAD OR WISHED YOU COULD GO TO SLEEP AND NOT WAKE UP?: NO
6. HAVE YOU EVER DONE ANYTHING, STARTED TO DO ANYTHING, OR PREPARED TO DO ANYTHING TO END YOUR LIFE?: NO

## 2025-01-20 ASSESSMENT — PATIENT HEALTH QUESTIONNAIRE - PHQ9
1. LITTLE INTEREST OR PLEASURE IN DOING THINGS: NOT AT ALL
SUM OF ALL RESPONSES TO PHQ9 QUESTIONS 1 & 2: 0
2. FEELING DOWN, DEPRESSED OR HOPELESS: NOT AT ALL

## 2025-01-20 ASSESSMENT — ACTIVITIES OF DAILY LIVING (ADL)
ASSISTIVE_DEVICE: WALKER
JUDGMENT_ADEQUATE_SAFELY_COMPLETE_DAILY_ACTIVITIES: YES
LACK_OF_TRANSPORTATION: NO
HEARING - LEFT EAR: FUNCTIONAL
ADEQUATE_TO_COMPLETE_ADL: YES
LACK_OF_TRANSPORTATION: NO
WALKS IN HOME: NEEDS ASSISTANCE
GROOMING: INDEPENDENT
DRESSING YOURSELF: NEEDS ASSISTANCE
BATHING: NEEDS ASSISTANCE
FEEDING YOURSELF: INDEPENDENT
LACK_OF_TRANSPORTATION: NO
PATIENT'S MEMORY ADEQUATE TO SAFELY COMPLETE DAILY ACTIVITIES?: YES
HEARING - RIGHT EAR: FUNCTIONAL
TOILETING: NEEDS ASSISTANCE

## 2025-01-20 ASSESSMENT — PAIN DESCRIPTION - ORIENTATION: ORIENTATION: LOWER;MID

## 2025-01-20 ASSESSMENT — PAIN SCALES - GENERAL
PAINLEVEL_OUTOF10: 9
PAINLEVEL_OUTOF10: 0 - NO PAIN
PAINLEVEL_OUTOF10: 10 - WORST POSSIBLE PAIN
PAINLEVEL_OUTOF10: 9
PAINLEVEL_OUTOF10: 5 - MODERATE PAIN
PAINLEVEL_OUTOF10: 5 - MODERATE PAIN
PAINLEVEL_OUTOF10: 7
PAINLEVEL_OUTOF10: 0 - NO PAIN

## 2025-01-20 ASSESSMENT — PAIN - FUNCTIONAL ASSESSMENT
PAIN_FUNCTIONAL_ASSESSMENT: 0-10
PAIN_FUNCTIONAL_ASSESSMENT: UNABLE TO SELF-REPORT
PAIN_FUNCTIONAL_ASSESSMENT: 0-10
PAIN_FUNCTIONAL_ASSESSMENT: 0-10
PAIN_FUNCTIONAL_ASSESSMENT: UNABLE TO SELF-REPORT
PAIN_FUNCTIONAL_ASSESSMENT: 0-10
PAIN_FUNCTIONAL_ASSESSMENT: 0-10

## 2025-01-20 ASSESSMENT — PAIN DESCRIPTION - LOCATION: LOCATION: BACK

## 2025-01-20 NOTE — ANESTHESIA PROCEDURE NOTES
Airway  Date/Time: 1/20/2025 7:51 AM  Urgency: elective      Staffing  Performed: CRNA   Authorized by: Nikolas Leung MD    Performed by: MAXWELL Heller-YASMINE  Patient location during procedure: OR    Indications and Patient Condition  Indications for airway management: anesthesia and airway protection  Spontaneous Ventilation: absent  Sedation level: deep  Preoxygenated: yes  Patient position: sniffing  MILS not maintained throughout  Mask difficulty assessment: 1 - vent by mask  Planned trial extubation    Final Airway Details  Final airway type: endotracheal airway      Successful airway: ETT  Cuffed: yes   Successful intubation technique: direct laryngoscopy  Facilitating devices/methods: intubating stylet  Endotracheal tube insertion site: oral  Blade: Timothy  Blade size: #4  ETT size (mm): 7.0  Cormack-Lehane Classification: grade I - full view of glottis  Placement verified by: chest auscultation and capnometry   Cuff volume (mL): 10  Measured from: teeth  ETT to teeth (cm): 21  Number of attempts at approach: 1  Ventilation between attempts: BVM  Number of other approaches attempted: 0    Additional Comments  Ett taped/tied in place

## 2025-01-20 NOTE — DISCHARGE INSTRUCTIONS
**Please call Marisa Sullivan RN (at [phone]) for any          postoperative questions or concerns.

## 2025-01-20 NOTE — PROGRESS NOTES
Physical Therapy    Physical Therapy Evaluation & Treatment    Patient Name: Ioana Mckeon  MRN: [id number]  Department: [place] [address]  Room: [address] [place] [address]  Today's Date: 1/20/2025   Time Calculation  Start Time: 1439  Stop Time: 1507  Time Calculation (min): 28 min    Assessment/Plan   PT Assessment  PT Assessment Results: Decreased strength, Decreased endurance, Impaired balance, Decreased mobility, Orthopedic restrictions  Rehab Prognosis: Good  Barriers to Discharge Home: No anticipated barriers (pending stair training)  End of Session Communication: Bedside nurse  Assessment Comment: Pt is a 59 y.o female s/.p hardware exchange at L4/5, L3-4 decompression and fusion on 1/20/2025.  Pt presents with decreased strength, balance, mobility, and endurance.  Pt with dizziness after amb with BP WNL.  Pt will benefit from skilled PT to address the above deficits and will benefit from low intensity PT upon discharge.  End of Session Patient Position: Bed, 3 rail up, Alarm on ( in room)   IP OR SWING BED PT PLAN  Inpatient or Swing Bed: Inpatient  PT Plan  Treatment/Interventions: Bed mobility, Transfer training, Gait training, Stair training, Balance training, Neuromuscular re-education, Strengthening, Endurance training, Therapeutic exercise, Therapeutic activity, Home exercise program  PT Plan: Ongoing PT  PT Frequency: Daily  PT Discharge Recommendations: Low intensity level of continued care  Equipment Recommended upon Discharge: Wheeled walker  PT Recommended Transfer Status: Stand by assist, Assistive device (RW)  PT - OK to Discharge: Yes (eval complete and discharge recommendations made)      Subjective     General Visit Information:  General  Reason for Referral: s/p hardware exchange at L4/L5, L3/4 decompression and fusion on 1/20/2025 with Dr. Ayers  Past Medical History Relevant to Rehab: Per chart, PMH includes Afib, arthritis, asthma, chronic pain disorder, GERD, hyperlipidemia, hiatal hernia,  nepholithiasis, OA, PONV, spinal stenosis, and L4-5 fusion  Family/Caregiver Present: Yes (supportive  present throughout entire session)  Prior to Session Communication: Bedside nurse  Patient Position Received: Bed, 3 rail up, Alarm on  General Comment: Pt cleared for PT by RN.  Pt alert and agreeable to PT. +PIV, hemovac  Home Living:  Home Living  Type of Home: House  Lives With: Spouse  Home Adaptive Equipment: None  Home Layout: Two level, Bed/bath upstairs, Stairs to alternate level with rails  Alternate Level Stairs-Rails: Left  Alternate Level Stairs-Number of Steps: 12  Home Access: Stairs to enter with rails  Entrance Stairs-Rails: Left  Entrance Stairs-Number of Steps: 1 step no rails, 3 steps L rail  Prior Level of Function:  Prior Function Per Pt/Caregiver Report  Level of [employer]: Independent with ADLs and functional transfers, Independent with homemaking with ambulation  Prior Function Comments: +driving, -falls  Precautions:  Precautions  Medical Precautions: Fall precautions, Spinal precautions  Post-Surgical Precautions: Spinal precautions    Vital Signs (Past 2hrs)        Date/Time Vitals Session Patient Position Pulse Resp SpO2 BP MAP (mmHg)    01/20/25 1439 Post PT  Sitting  76  --  --  121/78  --         Objective   Pain:  Pain Assessment  Pain Assessment: 0-10  0-10 (Numeric) Pain Score: 7  Pain Type: Surgical pain  Pain Location: Back  Pain Interventions: Ambulation/increased activity, Repositioned, Rest  Response to Interventions: No change in pain  Cognition:  Cognition  Overall Cognitive Status: Within Functional Limits    General Assessments:  Activity Tolerance  Endurance: Tolerates 10 - 20 min exercise with multiple rests    Sensation  Light Touch: No apparent deficits    Strength  Strength Comments: B LE strength grossly 4+/5  Coordination  Movements are Fluid and Coordinated: Yes    Postural Control  Postural Control: Within Functional Limits    Static Sitting  Balance  Static Sitting-Balance Support: Bilateral upper extremity supported, Feet supported  Static Sitting-Level of Assistance: Close supervision  Dynamic Sitting Balance  Dynamic Sitting-Balance Support: Bilateral upper extremity supported, Feet supported  Dynamic Sitting-Level of Assistance: Close supervision  Dynamic Sitting-Balance: Forward lean  Dynamic Sitting-Comments: CGA    Static Standing Balance  Static Standing-Balance Support: Bilateral upper extremity supported (RW)  Static Standing-Level of Assistance: Close supervision  Dynamic Standing Balance  Dynamic Standing-Balance Support: Bilateral upper extremity supported (RW)  Dynamic Standing-Level of Assistance: Close supervision  Dynamic Standing-Balance: Turning  Functional Assessments:  Bed Mobility  Bed Mobility: Yes  Bed Mobility 1  Bed Mobility 1: Rolling left, Rolling right  Level of Assistance 1: Close supervision  Bed Mobility Comments 1: use of bed rail  Bed Mobility 2  Bed Mobility  2: Side lying left to sit  Level of Assistance 2: Close supervision  Bed Mobility 3  Bed Mobility 3: Sitting to supine (reverse log roll)  Level of Assistance 3: Close supervision    Transfers  Transfer: Yes  Transfer 1  Transfer From 1: Bed to  Transfer to 1: Stand  Technique 1: Sit to stand, Stand to sit  Transfer Device 1: Walker  Transfer Level of Assistance 1: Contact guard  Transfers 2  Transfer From 2: Chair with arms to  Transfer to 2: Stand  Technique 2: Sit to stand, Stand to sit  Transfer Device 2: Walker  Transfer Level of Assistance 2: Close supervision    Ambulation/Gait Training  Ambulation/Gait Training Performed: Yes  Ambulation/Gait Training 1  Surface 1: Level tile  Device 1: Rolling walker  Assistance 1: Close supervision  Quality of Gait 1: Decreased step length, Inconsistent stride length, Diminished heel strike (decreased gait speed)  Comments/Distance (ft) 1: 70ft    Stairs  Stairs: No  Treatments:  Ambulation/Gait Training  Ambulation/Gait  Training Performed: Yes  Ambulation/Gait Training 1  Surface 1: Level tile  Device 1: Rolling walker  Assistance 1: Close supervision  Quality of Gait 1: Decreased step length, Inconsistent stride length, Diminished heel strike (decreased gait speed)  Comments/Distance (ft) 1: 70ft  Outcome Measures:  [facility] Basic Mobility  Turning from your back to your side while in a flat bed without using bedrails: A little  Moving from lying on your back to sitting on the side of a flat bed without using bedrails: A little  Moving to and from bed to chair (including a wheelchair): A little  Standing up from a chair using your arms (e.g. wheelchair or bedside chair): A little  To walk in hospital room: A little  Climbing 3-5 steps with railing: A little  Basic Mobility - Total Score: 18    Encounter Problems       Encounter Problems (Active)       Balance       Pt will maintain static/dynamic standing balance x4 minutes with RW and modified independence to demonstrate improved balance       Start:  01/20/25    Expected End:  02/03/25               Mobility       Pt will complete bed mobility independently via log roll technique       Start:  01/20/25    Expected End:  02/03/25            Pt will amb >250ft with RW and modified independence in prep for safe discharge home        Start:  01/20/25    Expected End:  02/03/25            Pt will a/descend 12 steps with L rail in prep for safe home entry and to access bed/bathroom       Start:  01/20/25    Expected End:  02/03/25               PT Transfers       Pt will transfer sit to stand with RW and modified independence in prep for out of bed mobility        Start:  01/20/25    Expected End:  02/03/25                   Education Documentation  Precautions, taught by Yessica Goodman, PT at 1/20/2025  3:30 PM.  Learner: Patient  Readiness: Acceptance  Method: Explanation  Response: Verbalizes Understanding  Comment: Spine precautions    Body Mechanics, taught by Yessica Goodman, PT at  1/20/2025  3:30 PM.  Learner: Patient  Readiness: Acceptance  Method: Explanation  Response: Verbalizes Understanding  Comment: Spine precautions    Mobility Training, taught by Yessica Goodman PT at 1/20/2025  3:30 PM.  Learner: Patient  Readiness: Acceptance  Method: Explanation  Response: Verbalizes Understanding  Comment: Spine precautions    Education Comments  No comments found.

## 2025-01-20 NOTE — HOSPITAL COURSE
59 year-old female who presented with lumbar stenosis at L3-4 with associated radicular symptoms. Patient is now s/p removal of hardware at L4/5, L3/4 decompression and fusion from L3-5 on 1/20/2025 by Dr. Ayers. On the day of surgery, patient was identified in the pre-operative holding area and agreeable to proceed with surgery. Written consent was obtained.  Please see operative note for further details of this procedure. The surgery went without complications. Patient received 24 hours of jenna-operative antibiotics. Patient recovered in the PACU before transfer to a regular nursing floor. The patient was monitored as an inpatient postoperatively for drain output, therapy, and pain control. The drain was pulled when output was appropriately low. The patient had good pain control, was voiding, and was neurovascularly stable at the time of discharge. Patient will follow-up with Dr. Ayers in 3-4 weeks for post-operative visit.    Prescription for oxycodone 5mg q4-6 hr #42 provided due to exceedingly painful nature of surgical procedure. Patients undergoing these operations typically use 1-2 pills q4-6 hours for the first 1-2 weeks. The use will be monitored postoperatively by Dr. Ayers, and weaned appropriately during the recovery period.

## 2025-01-20 NOTE — ANESTHESIA PREPROCEDURE EVALUATION
Patient: Ioana Mckeon    Procedure Information       Anesthesia Start Date/Time: 01/20/25 0732    Procedure: FUSION, SPINE, LUMBAR, TLIF (Spine Lumbar) - L3-4 TLIF  39197, 47527, 90037, [address]641   [employer] Elevate Solara  M48.062    Location: [facility] OR  / [facility] OR    Surgeons: Javed Ayers MD            Relevant Problems   Musculoskeletal   (+) Spinal stenosis, lumbar region with neurogenic claudication       Clinical information reviewed:   Tobacco  Allergies  Meds   Med Hx  Surg Hx   Fam Hx  Soc Hx        NPO Detail:  NPO/Void Status  Date of Last Liquid: 01/19/25  Time of Last Liquid: 2130  Date of Last Solid: 01/19/25  Time of Last Solid: 1700         Physical Exam    Airway  Mallampati: II  TM distance: >3 FB     Cardiovascular   Rhythm: regular  Rate: normal     Dental    Pulmonary   Breath sounds clear to auscultation     Abdominal        Anesthesia Plan    History of general anesthesia?: yes  History of complications of general anesthesia?: yes    ASA 2     general   (PlanGA by oett. H/o PONV,  Plan appetpitant, scope patch, dcadron, zofran)  intravenous induction   Postoperative administration of opioids is intended.  Trial extubation is planned.  Anesthetic plan and risks discussed with patient.    Plan discussed with CRNA.

## 2025-01-20 NOTE — PROGRESS NOTES
"Orthopaedic Surgery Progress Note    Subjective: Evaluated in immediate postoperative period. Pain well controlled considering recent surgery. Denies chest pain, shortness of breath, or fevers.    Objective:  /62 (BP Location: Right arm, Patient Position: Lying)   Pulse 65   Temp 36 °C (96.8 °F) (Temporal)   Resp 10   Ht 1.651 m (5' 5\")   Wt 85.8 kg (189 lb 2.5 oz)   SpO2 98%   BMI 31.48 kg/m²     Gen: arousable, NAD, appropriately conversational  Cardiac: RRR to peripheral palpation  Resp: nonlabored on RA  GI: soft, nondistended    MSK:  Spine Exam:  Drain in place holding suction, sanguineous output  Surgical dressing clean, dry, and intact    L1: SILT       L2: SILT      Hip flexors 5/5 Left; 5/5 Right  L3: SILT      Knee extension 5/5 Left; 5/5 Right  L4: SILT      Tib Ant. (Dorsiflexion) 5/5 Left; 5/5 Right  L5: SILT      EHL 5/5 Left; 5/5 Right  S1: SILT      Plantarflexion 5/5 Left; 5/5 Right    Assessment/Plan: 59 y.o. female PMHx prior L4-5 fusion s/p hardware exchange at L4/L5, L3/4 decompression and fusion on 1/20/2025 with Dr. Ayers.      Plan:  Plan:  - Weight-bearing status: Activity as tolerated; no excessive twisting, turning, or bending    - Feeding: Regular diet as tolerated, bowel regimen  - Analgesia: Tylenol 650mg, oxy 5mg/10mg, Dilaudid 0.4mg, Robaxin prn, dexamethasone 10 mg IV q8h for 3 doses   - Volume: mIV @ at  cc/hr, HLIVF when tolerating PO, monitor BMP  - OT/PT: Consulted  - Respiratory: Encourage IS, maintain O2 sat >92%  - Infection: Sivan-operative Ancef for 48 hours, afebrile   - Lines: Maintain PIVx2 while inpatient  - Drains: Maintain HV drain, monitor and record output q8 hrs  - Gunn: None   - Embolic ppx: SCDs, early mobility    - Transfusion: no indication for transfusion  - Cardiac: No issues  - Glycemic: POC while on steroids   - C/w home medications  - Dispo pending PT, pain control    Plan was discussed with attending Dr. Armen Montalvo, " MD  Orthopaedic Surgery PGY-2  [facility]  Pager: [phone]  Available by Epic Chat    While admitted, this patient will be followed by the Ortho Spine Team. Please contact below residents with any questions (available via Epic Chat).     First call: Jesus Montalvo, PGY-2   Second call: Mayo Ayoub, PGY-4  Third call: Marcos Lees, Fellow

## 2025-01-20 NOTE — ANESTHESIA POSTPROCEDURE EVALUATION
Patient: Ioana Mckeon    Procedure Summary       Date: 01/20/25 Room / Location: [facility] OR 07 / [facility] OR    Anesthesia Start: 0732 Anesthesia Stop: 1026    Procedure: FUSION, SPINE, LUMBAR, TLIF (Spine Lumbar) Diagnosis:       Spinal stenosis, lumbar region with neurogenic claudication      (Spinal stenosis, lumbar region with neurogenic claudication [M48.062])    Surgeons: Javed Ayers MD Responsible Provider: Nikolas Leung MD    Anesthesia Type: general ASA Status: 2            Anesthesia Type: general    Vitals Value Taken Time   /59 01/20/25 1215   Temp 36 °C (96.8 °F) 01/20/25 1215   Pulse 71 01/20/25 1219   Resp 8 01/20/25 1219   SpO2 100 % 01/20/25 1218   Vitals shown include unfiled device data.    Anesthesia Post Evaluation    Patient location during evaluation: PACU  Patient participation: complete - patient participated  Level of consciousness: awake  Pain management: adequate  Airway patency: patent  Cardiovascular status: acceptable  Respiratory status: acceptable  Hydration status: acceptable  Postoperative Nausea and Vomiting: none    There were no known notable events for this encounter.

## 2025-01-20 NOTE — PROGRESS NOTES
Ioana Mckeon is a 59 y.o. female on day 0 of admission presenting with Spinal stenosis, lumbar region with neurogenic claudication.    Transitional Care Coordination Progress Note:  Patient discussed during interdisciplinary rounds.   Team members present: MD, TCC  Plan per Medical/Surgical team: OR 1/20  Payer: [employer]   Status: Inpatient  Discharge disposition: TBD pending s/p OR  Potential Barriers: NMR  ADOD: 1/20/25

## 2025-01-20 NOTE — H&P
[facility] Department of Orthopaedic Surgery   Surgical History & Physical >30 Days    Reason for Surgery: Lumbar stenosis at L3-4  Planned Procedure: L3/L4 TLIF    History & Physical Reviewed:  Ioana Mckeon is a 59 y.o. female presenting with the above symptoms. This patient was evaluated as an outpatient, and a plan was made for operative management. Risks and benefits were discussed, and the patient and/or caregivers elected to proceed. The patient presents for the above listed procedure today.     Past Medical History:   Diagnosis Date    A-fib (Multi)     (isolated event ) ~10yrs ago    Arthritis     Asthma     Chronic pain disorder     GERD (gastroesophageal reflux disease)     Hiatal hernia     Hyperlipidemia     Joint pain     Nephrolithiasis     Osteoarthritis     Personal history of other diseases of the respiratory system     History of bronchitis    Personal history of other endocrine, nutritional and metabolic disease     History of hypercholesterolemia    PONV (postoperative nausea and vomiting)     Spinal stenosis     Varicose veins of left lower extremity with pain      Past Surgical History:   Procedure Laterality Date    CHOLECYSTECTOMY      HUMERUS Right     mackenzie and 8 screws    HYSTERECTOMY      KNEE ARTHROPLASTY Bilateral     OTHER SURGICAL HISTORY      Knee Arthroscopy With Medial Meniscectomy     Social History     Tobacco Use    Smoking status: Never    Smokeless tobacco: Never   Substance Use Topics    Alcohol use: Yes     Comment: two beers every tuesday- bowling     Prior to Admission medications    Medication Sig Start Date End Date Taking? Authorizing Provider   acetaminophen (Tylenol 8 HOUR) 650 mg ER tablet Take 1 tablet (650 mg) by mouth every 8 hours if needed for mild pain (1 - 3). Do not crush, chew, or split.   Yes Historical Provider, MD   aspirin 81 mg EC tablet Take 1 tablet (81 mg) by mouth once daily.   Yes Historical Provider, MD   chlorhexidine (Hibiclens) 4 %  external liquid Use as directed daily preoperatively 1/9/25  Yes ANIBAL Rowan   chlorhexidine (Peridex) 0.12 % solution Swish and spit with 15ml of solution the night before and morning of surgery. Do not swallow. 1/9/25  Yes ANIBAL Rowan   ergocalciferol (Vitamin D-2) 1.25 MG (97339 UT) capsule Take 1 capsule (50,000 Units) by mouth. 5/9/24  Yes Historical Provider, MD   montelukast (Singulair) 5 mg chewable tablet Chew 1 tablet (5 mg) once daily at bedtime.   Yes Historical Provider, MD   oxybutynin XL (Ditropan-XL) 10 mg 24 hr tablet Take 1 tablet (10 mg) by mouth once daily. Do not crush, chew, or split.   Yes Historical Provider, MD   pantoprazole (ProtoNix) 20 mg EC tablet Take 1 tablet (20 mg) by mouth once daily in the morning. Take before meals. Do not crush, chew, or split.   Yes Historical Provider, MD   rosuvastatin (Crestor) 5 mg tablet Take 1 tablet (5 mg) by mouth once daily.   Yes Historical Provider, MD   sucralfate (Carafate) 1 gram tablet Take 1 tablet (1 g) by mouth 4 times a day before meals.   Yes Historical Provider, MD   meloxicam (Mobic) 15 mg tablet TAKE 1 TABLET BY MOUTH EVERY DAY  Patient not taking: Reported on 1/20/2025 11/21/24   Javed Ayers MD     Allergies   Allergen Reactions    Penicillins Other    Sulfa (Sulfonamide Antibiotics) Hives    Tylox [Oxycodone-Acetaminophen] Hives       Review of Systems:   Gen: Denies recent weight loss  Neuro: Denies recent confusion  Ophtho: Denies changes in vision  ENT: Denies changes in hearing  Endo: Denies weight loss/weight gain  CV: Denies chest pain  Resp: Denies shortness of breath  GI: Denies melena/hematochezia  : Denies painful urination  MSK: Per above HPI  Heme: No abnormal bleeding  Psych: Denies hallucinations    Physical Exam:  - Constitutional: No acute distress, cooperative  - Eyes: EOM grossly intact  - Head/Neck: Trachea midline  - Respiratory/Thorax: Normal work of breathing  -  Cardiovascular: RRR on peripheral palpation  - Gastrointestinal: Nondistended  - Psychological: Appropriate mood/behavior  - Skin: Warm and dry. Additional findings in musculoskeletal evaluation  - Musculoskeletal: Moves all extremities    ERAS patient?: No    COVID-19 Risk Consent:   Surgeon has reviewed the key risks related to caridad COVID-19 and subsequent sequelae.       Jesus Montalvo MD   Orthopaedic Surgery PGY-2

## 2025-01-20 NOTE — CARE PLAN
Problem: Pain  Goal: Takes deep breaths with improved pain control throughout the shift  1/20/2025 1759 by Brittney Reynaga RN  Outcome: Progressing  1/20/2025 1758 by Brittney Reynaga RN  Outcome: Progressing  Goal: Turns in bed with improved pain control throughout the shift  1/20/2025 1759 by Brittney Reynaga RN  Outcome: Progressing  1/20/2025 1758 by Brittney Reynaga RN  Outcome: Progressing  Goal: Walks with improved pain control throughout the shift  1/20/2025 1759 by Brittney Reynaga RN  Outcome: Progressing  1/20/2025 1758 by Brittney Reynaga RN  Outcome: Progressing  Goal: Performs ADL's with improved pain control throughout shift  1/20/2025 1759 by Brittney Reynaga RN  Outcome: Progressing  1/20/2025 1758 by Brittney Reynaga RN  Outcome: Progressing  Goal: Participates in PT with improved pain control throughout the shift  1/20/2025 1759 by Brittney Reynaga RN  Outcome: Progressing  1/20/2025 1758 by Brittney Reynaga RN  Outcome: Progressing  Goal: Free from opioid side effects throughout the shift  1/20/2025 1759 by Brittney Reynaga RN  Outcome: Progressing  1/20/2025 1758 by Brittney Reynaga RN  Outcome: Progressing  Goal: Free from acute confusion related to pain meds throughout the shift  1/20/2025 1759 by Brittney Reynaga RN  Outcome: Progressing  1/20/2025 1758 by Brittney Reynaga RN  Outcome: Progressing     Problem: Risk for falls  Goal: I will remain free from falls  Outcome: Progressing     Problem: Pain - Adult  Goal: Verbalizes/displays adequate comfort level or baseline comfort level  Outcome: Progressing     Problem: Safety - Adult  Goal: Free from fall injury  Outcome: Progressing     Problem: Discharge Planning  Goal: Discharge to home or other facility with appropriate resources  Outcome: Progressing     Problem: Chronic Conditions and Co-morbidities  Goal: Patient's chronic conditions and co-morbidity symptoms are monitored and maintained or improved  Outcome: Progressing   The patient's goals for  the shift include      The clinical goals for the shift include      Over the shift, the patient did  make progress toward the following goals.

## 2025-01-20 NOTE — OP NOTE
FUSION, SPINE, LUMBAR, TLIF Operative Note     Date: 2025  OR Location: [facility] OR    Name: Ioana Mckeon, : 1965, Age: 59 y.o., MRN: [id number], Sex: female    Diagnosis  Pre-op Diagnosis      * Spinal stenosis, lumbar region with neurogenic claudication [M48.062] Post-op Diagnosis     * Spinal stenosis, lumbar region with neurogenic claudication [M48.062]     Procedures    L3-4 Decompression and Fusion  Explore Fusion MasRemove Segmental Instrumentation  Surgical Navigation  Surgeons      * Javed Ayers - Primary    Resident/Fellow/Other Assistant:  Surgeons and Role:     * Jesus Montalvo MD - Resident - Assisting    Staff:   Circulator: Temo Almonte Person: Magali  Circulator: Aniyah    Anesthesia Staff: Anesthesiologist: Nikolas Leung MD  CRNA: MAXWELL Heller-CRNA    Procedure Summary  Anesthesia: General  ASA: II  Estimated Blood Loss:  15 mL  Intra-op Medications:   Administrations occurring from 0645 to 1130 on 25:   Medication Name Total Dose   bacitracin ointment 1 Application   thrombin-recombinant (Recothrom) 5,000 unit topical solution 10,000 Units   gelatin absorbable (Gelfoam) 100 sponge 1 each   polymyxin B 1,000,000 Units in sodium chloride 0.9 % 1,000 mL irrigation 1,000 mL   HYDROmorphone (Dilaudid) injection 0.5 mg 0.5 mg   lactated Ringer's infusion 46.67 mL   oxygen (O2) therapy 90 L   droperidol (Inapsine) injection 0.625 mg 0.625 mg   methocarbamol (Robaxin) injection 1,000 mg 1,000 mg   ondansetron (Zofran) injection 4 mg 4 mg   acetaminophen (Ofirmev) injection 1,000 mg   aprepitant (Emend) 40 mg capsule 40 mg   ceFAZolin (Ancef) vial 1 g 2 g   dexAMETHasone (Decadron) injection 4 mg/mL 10 mg   fentaNYL (Sublimaze) injection 50 mcg/mL 100 mcg   glycopyrrolate (Robinul) injection 0.2 mg   HYDROmorphone (Dilaudid) injection 1 mg/mL 1 mg   lactated Ringer's infusion Cannot be calculated   lidocaine (cardiac) injection 2% prefilled syringe 100 mg    ondansetron (Zofran) 2 mg/mL injection 4 mg   phenylephrine 40 mcg/mL syringe 10 mL 40 mcg   propofol (Diprivan) injection 10 mg/mL 200 mg   rocuronium (ZeMuron) 50 mg/5 mL injection 120 mg   scopolamine patch 1 patch   sugammadex (Bridion) 200 mg/2 mL injection 600 mg              Anesthesia Record               Intraprocedure I/O Totals          Intake    lactated Ringer's 1025.00 mL    Total Intake 1025 mL       Output    Est. Blood Loss 15 mL    Total Output 15 mL       Net    Net Volume 1010 mL          Specimen: No specimens collected              Drains and/or Catheters:   Closed/Suction Drain Inferior;Midline Back Accordion 15 Fr. (Active)       Tourniquet Times:         Implants:  Implants       Type Name Action Serial No.      Graft BONE, CHIP, CANCELLOUS, FREEZE DRIED, ASEPTIC, 1.7-10 MM, 60 CC - [id number] - [id number] Implanted [id number]     Graft INFUSE BMP SMALL - [id number] Implanted      Spinal Hardware SCREW, MAS 5.5 X 50 CC SOLERA - [id number] Implanted      Spinal Hardware SCREW, SOLERA 5.5 MAS, 7.5X50 - [id number] Implanted      Screw SCREW, MAS 6.5 X 50 CC - [id number] Implanted      Screw SET SCREW, 5.5, TI NS BRK OFF - [id number] Implanted      Spinal Hardware SCREW, MAS 6.5 X 45 CC SOLERA - [id number] Implanted      Spinal Hardware VENESSA, 5.5 X 60 CVD TI SOLERA - [id number] Implanted               Clinical note: This 59-year-old woman has developed disabling lumbar radiculopathy.  She has had a prior L4-5 decompression and fusion that she did well with.  She has developed adjacent level disease.  Conservative measures of failed to relieve her symptoms.  Currently she presents for a revision decompression and fusion.  The rationale for the above-noted procedure has been discussed at length as have the risks benefits expectations limitations alternatives and potential complications.  She understands wished to proceed.  She was brought to the operating room.  Huddle was held, she was  identified, antibiotics administered, sequential compression devices placed.  A general anesthetic was secured. she was carefully turned into the prone position on the Edward frame with all body prominences well-padded.  The back was prepped and draped in a sterile fashion.  A prior midline incision was employed and carried down sharply through skin and subcutaneous tissue.  A subperiosteal dissection exposed the L3-4 level and the prior instrumentation.  The setscrews were removed.  The rods were removed.  We assessed the screws.  They had good purchase but I did exchange them to the larger screws.  This also allowed for complete exposure of the posterolateral fusion mass at L4-5 which appeared to be solid.  All scarring was removed so that we had appropriate visualization of the fusion mass to decorticated and allow for further bone grafting.  The spinous process of L3 was trimmed.  The inferior aspect of the L3 lamina was thinned with a Leksell rongeur and a high-speed bur.  A plane was created between the underlying epidural scar and the overlying bone.  The inferior facets of L3 were taken down to allow for complete lateral recess and ultimately foraminal decompression.  There was quite severe central and lateral recess stenosis.  The hypertrophic ligamentum was dissected from the dura and removed.  At the completion of the decompression all neural elements were quite free.  The wound was copiously irrigated.  The local bone was cleaned of all soft tissue and morselized with allograft chips and a bone mill.  A small bone morphogenic protein soaked sponge was prepared.  The transverse processes of L3 were decorticated as well as the prior fusion mass.  The sponges were placed in direct contact with the bone and the bone graft mixture liberally on top of this.  The O-arm was brought in and we obtained surgical navigation and employed this to place new pedicle screws bilaterally at L3.  Given the relatively small  size of the pedicle at L3 we employed 5.5 millimeter screws from the Solera kit.  We then exchanged the L4 and 5 screws for larger screws.  Rods were secured to the screws and tightened.  Meticulous hemostasis was obtained.  Hemovac drain was placed deep to the fascia.  The deep fascia was closed with interrupted #1 Vicryl, the subcutaneous tissue with interrupted 2-0 Vicryl and the skin with nylon sutures.  A dry sterile dressing was applied.  She was carefully placed in the supine position on her hospital bed, awakened and extubated and transferred to the recovery room in stable condition.** Dictated with voice recognition software and not immediately reviewed for errors in grammar and/or spelling **  Attending Attestation: I was present and scrubbed for the entire procedure.    Javed Ayres  Phone Number: [phone]

## 2025-01-20 NOTE — BRIEF OP NOTE
Date: 2025  OR Location: [facility] OR    Name: Ioana Mckeon, : 1965, Age: 59 y.o., MRN: [id number], Sex: female    Diagnosis  Pre-op Diagnosis      * Spinal stenosis, lumbar region with neurogenic claudication [M48.062] Post-op Diagnosis     * Spinal stenosis, lumbar region with neurogenic claudication [M48.062]     Procedures  Exchange of prior hardware at L4/L5  L3/4 decompression and fusion    Surgeons      * Javed Ayers - Primary    Resident/Fellow/Other Assistant:  Surgeons and Role:     * Jesus Montalvo MD - Resident - Assisting    Staff:   Circulator: Temo Almonte Person: Magali  Circulator: Aniyah    Anesthesia Staff: Anesthesiologist: Nikolas Leung MD  CRNA: MAXWELL Heller-CRNA    Procedure Summary  Anesthesia: General  ASA: II  Estimated Blood Loss: 15 mL  Intra-op Medications:   Administrations occurring from 0645 to 1130 on 25:   Medication Name Total Dose   bacitracin ointment 1 Application   thrombin-recombinant (Recothrom) 5,000 unit topical solution 10,000 Units   gelatin absorbable (Gelfoam) 100 sponge 1 each   polymyxin B 1,000,000 Units in sodium chloride 0.9 % 1,000 mL irrigation 1,000 mL   ondansetron (Zofran) injection 4 mg 4 mg   acetaminophen (Ofirmev) injection 1,000 mg   aprepitant (Emend) 40 mg capsule 40 mg   ceFAZolin (Ancef) vial 1 g 2 g   dexAMETHasone (Decadron) injection 4 mg/mL 10 mg   fentaNYL (Sublimaze) injection 50 mcg/mL 100 mcg   glycopyrrolate (Robinul) injection 0.2 mg   HYDROmorphone (Dilaudid) injection 1 mg/mL 1 mg   lactated Ringer's infusion Cannot be calculated   lidocaine (cardiac) injection 2% prefilled syringe 100 mg   ondansetron (Zofran) 2 mg/mL injection 4 mg   phenylephrine 40 mcg/mL syringe 10 mL 40 mcg   propofol (Diprivan) injection 10 mg/mL 200 mg   rocuronium (ZeMuron) 50 mg/5 mL injection 120 mg   scopolamine patch 1 patch   sugammadex (Bridion) 200 mg/2 mL injection 600 mg              Anesthesia Record                Intraprocedure I/O Totals          Intake    lactated Ringer's 1025.00 mL    Total Intake 1025 mL       Output    Est. Blood Loss 15 mL    Total Output 15 mL       Net    Net Volume 1010 mL          Specimen: No specimens collected               Findings: Significant motion at L3/4 facets consistent with preoperative imaging and diagnosis. See operative dictation for further details.    Complications:  None; patient tolerated the procedure well.     Disposition: PACU - hemodynamically stable.  Condition: stable  Specimens Collected: No specimens collected  Attending Attestation: I was present and scrubbed for the entire procedure.    Javed Ayers  Phone Number: [phone]

## 2025-01-20 NOTE — DISCHARGE INSTR - OTHER ORDERS
Wound Care  -Wound site: Back (Lumbar Spine)  -Wound Type: Surgical Incision  -Change the dressing daily and continue until the incision is no longer draining.          -Once the incision  has been dry for 48 hours, you may leave the dressing        off and the site open to air.  -Cover the wound with an abdominal pad and secure it with paper tape around the edges.  -If there are sutures in your incision, they will be removed at an appt. in 10-14 days  -No Lotions or Creams on or around the incision site.  -No tub soaks.  -You may use heat or ice to your incision sites and or back as         needed for comfort.

## 2025-01-21 PROCEDURE — 97530 THERAPEUTIC ACTIVITIES: CPT | Mod: GP,CQ

## 2025-01-21 PROCEDURE — 97110 THERAPEUTIC EXERCISES: CPT | Mod: GP,CQ

## 2025-01-21 PROCEDURE — 2500000004 HC RX 250 GENERAL PHARMACY W/ HCPCS (ALT 636 FOR OP/ED)

## 2025-01-21 PROCEDURE — 97116 GAIT TRAINING THERAPY: CPT | Mod: GP,CQ

## 2025-01-21 PROCEDURE — 2500000002 HC RX 250 W HCPCS SELF ADMINISTERED DRUGS (ALT 637 FOR MEDICARE OP, ALT 636 FOR OP/ED)

## 2025-01-21 PROCEDURE — 1100000001 HC PRIVATE ROOM DAILY

## 2025-01-21 PROCEDURE — 2500000001 HC RX 250 WO HCPCS SELF ADMINISTERED DRUGS (ALT 637 FOR MEDICARE OP)

## 2025-01-21 PROCEDURE — 97165 OT EVAL LOW COMPLEX 30 MIN: CPT | Mod: GO

## 2025-01-21 RX ORDER — MILK THISTLE 150 MG
1 CAPSULE ORAL DAILY
COMMUNITY

## 2025-01-21 RX ADMIN — CEFAZOLIN SODIUM 2 G: 2 INJECTION, SOLUTION INTRAVENOUS at 13:18

## 2025-01-21 RX ADMIN — METHOCARBAMOL 1000 MG: 500 TABLET ORAL at 08:25

## 2025-01-21 RX ADMIN — SUCRALFATE 1 G: 1 TABLET ORAL at 08:25

## 2025-01-21 RX ADMIN — DEXAMETHASONE SODIUM PHOSPHATE 10 MG: 10 INJECTION INTRAMUSCULAR; INTRAVENOUS at 06:30

## 2025-01-21 RX ADMIN — PANTOPRAZOLE SODIUM 20 MG: 20 TABLET, DELAYED RELEASE ORAL at 06:28

## 2025-01-21 RX ADMIN — MONTELUKAST SODIUM 5 MG: 5 TABLET, CHEWABLE ORAL at 20:46

## 2025-01-21 RX ADMIN — CEFAZOLIN SODIUM 2 G: 2 INJECTION, SOLUTION INTRAVENOUS at 06:28

## 2025-01-21 RX ADMIN — METHOCARBAMOL 1000 MG: 500 TABLET ORAL at 15:22

## 2025-01-21 RX ADMIN — ROSUVASTATIN 5 MG: 5 TABLET, FILM COATED ORAL at 08:25

## 2025-01-21 RX ADMIN — OXYBUTYNIN CHLORIDE 5 MG: 5 TABLET ORAL at 08:25

## 2025-01-21 RX ADMIN — ACETAMINOPHEN 975 MG: 325 TABLET ORAL at 13:18

## 2025-01-21 RX ADMIN — SUCRALFATE 1 G: 1 TABLET ORAL at 12:13

## 2025-01-21 RX ADMIN — METHOCARBAMOL 1000 MG: 500 TABLET ORAL at 20:45

## 2025-01-21 RX ADMIN — MONTELUKAST SODIUM 5 MG: 5 TABLET, CHEWABLE ORAL at 03:39

## 2025-01-21 RX ADMIN — ACETAMINOPHEN 975 MG: 325 TABLET ORAL at 20:45

## 2025-01-21 RX ADMIN — OXYCODONE 5 MG: 5 TABLET ORAL at 10:50

## 2025-01-21 RX ADMIN — SUCRALFATE 1 G: 1 TABLET ORAL at 16:13

## 2025-01-21 RX ADMIN — CEFAZOLIN SODIUM 2 G: 2 INJECTION, SOLUTION INTRAVENOUS at 20:47

## 2025-01-21 RX ADMIN — BENZOCAINE AND MENTHOL 1 LOZENGE: 15; 3.6 LOZENGE ORAL at 16:12

## 2025-01-21 RX ADMIN — OXYBUTYNIN CHLORIDE 5 MG: 5 TABLET ORAL at 20:45

## 2025-01-21 RX ADMIN — SUCRALFATE 1 G: 1 TABLET ORAL at 20:46

## 2025-01-21 RX ADMIN — ACETAMINOPHEN 975 MG: 325 TABLET ORAL at 06:28

## 2025-01-21 RX ADMIN — POLYETHYLENE GLYCOL 3350 17 G: 17 POWDER, FOR SOLUTION ORAL at 08:25

## 2025-01-21 RX ADMIN — KETOROLAC TROMETHAMINE 15 MG: 15 INJECTION, SOLUTION INTRAMUSCULAR; INTRAVENOUS at 03:39

## 2025-01-21 ASSESSMENT — COGNITIVE AND FUNCTIONAL STATUS - GENERAL
STANDING UP FROM CHAIR USING ARMS: A LITTLE
DAILY ACTIVITIY SCORE: 21
TOILETING: A LITTLE
HELP NEEDED FOR BATHING: A LITTLE
MOBILITY SCORE: 18
WALKING IN HOSPITAL ROOM: A LITTLE
WALKING IN HOSPITAL ROOM: A LITTLE
DAILY ACTIVITIY SCORE: 20
HELP NEEDED FOR BATHING: A LITTLE
MOVING FROM LYING ON BACK TO SITTING ON SIDE OF FLAT BED WITH BEDRAILS: A LITTLE
DRESSING REGULAR UPPER BODY CLOTHING: A LITTLE
DRESSING REGULAR LOWER BODY CLOTHING: A LITTLE
MOVING TO AND FROM BED TO CHAIR: A LITTLE
DRESSING REGULAR LOWER BODY CLOTHING: A LITTLE
TURNING FROM BACK TO SIDE WHILE IN FLAT BAD: A LITTLE
STANDING UP FROM CHAIR USING ARMS: A LITTLE
TOILETING: A LITTLE
TURNING FROM BACK TO SIDE WHILE IN FLAT BAD: A LITTLE
MOVING FROM LYING ON BACK TO SITTING ON SIDE OF FLAT BED WITH BEDRAILS: A LITTLE
CLIMB 3 TO 5 STEPS WITH RAILING: A LITTLE
CLIMB 3 TO 5 STEPS WITH RAILING: A LITTLE
MOVING TO AND FROM BED TO CHAIR: A LITTLE
MOBILITY SCORE: 18

## 2025-01-21 ASSESSMENT — PAIN - FUNCTIONAL ASSESSMENT
PAIN_FUNCTIONAL_ASSESSMENT: 0-10

## 2025-01-21 ASSESSMENT — PAIN SCALES - GENERAL
PAINLEVEL_OUTOF10: 5 - MODERATE PAIN
PAINLEVEL_OUTOF10: 4
PAINLEVEL_OUTOF10: 0 - NO PAIN
PAINLEVEL_OUTOF10: 4
PAINLEVEL_OUTOF10: 0 - NO PAIN
PAINLEVEL_OUTOF10: 0 - NO PAIN
PAINLEVEL_OUTOF10: 5 - MODERATE PAIN

## 2025-01-21 ASSESSMENT — ACTIVITIES OF DAILY LIVING (ADL)
LACK_OF_TRANSPORTATION: NO
BATHING_ASSISTANCE: MINIMAL
ADL_ASSISTANCE: INDEPENDENT

## 2025-01-21 NOTE — PROGRESS NOTES
"Orthopaedic Surgery Progress Note    Subjective: Evaluated on regular nursing floor. Pain very well controlled. Denies chest pain, shortness of breath, or fevers. Tolerating liquids and small diet. Passing flatus but no BM yet. Urinating well.    Ambulated with PT and rec'd HHC.    Objective:  /65   Pulse 73   Temp 35.8 °C (96.4 °F) (Temporal)   Resp 16   Ht 1.651 m (5' 5\")   Wt 85.8 kg (189 lb 2.5 oz)   SpO2 93%   BMI 31.48 kg/m²     Gen: arousable, NAD, appropriately conversational  Cardiac: RRR to peripheral palpation  Resp: nonlabored on RA  GI: soft, nondistended    MSK:  Spine Exam:  Drain in place holding suction, sanguineous output  Surgical dressing clean, dry, and intact    L1: SILT       L2: SILT      Hip flexors 5/5 Left; 5/5 Right  L3: SILT      Knee extension 5/5 Left; 5/5 Right  L4: SILT      Tib Ant. (Dorsiflexion) 5/5 Left; 5/5 Right  L5: SILT      EHL 5/5 Left; 5/5 Right  S1: SILT      Plantarflexion 5/5 Left; 5/5 Right    Assessment/Plan: 59 y.o. female PMHx prior L4-5 fusion s/p hardware exchange at L4/L5, L3/4 decompression and fusion on 1/20/2025 with Dr. Ayers.      Plan:  Plan:  - Weight-bearing status: Activity as tolerated; no excessive twisting, turning, or bending    - Feeding: Regular diet as tolerated, bowel regimen  - Analgesia: Tylenol 650mg, oxy 5mg/10mg, Dilaudid 0.4mg, Robaxin prn, dexamethasone 10 mg IV q8h for 3 doses   - Volume: mIV @ at  cc/hr, HLIVF when tolerating PO, monitor BMP  - OT/PT: Consulted, recommended HHC  - Respiratory: Encourage IS, maintain O2 sat >92%  - Infection: Sivan-operative Ancef for 48 hours, afebrile   - Lines: Maintain PIVx2 while inpatient  - Drains: Maintain HV drain, monitor and record output q8 hrs  - Gunn: None   - Embolic ppx: SCDs, early mobility    - Transfusion: no indication for transfusion  - Cardiac: No issues  - Glycemic: no glycemic issues  - C/w home medications  - Dispo pending PT, pain control    Plan was discussed " with attending Dr. Armen Montalvo MD  Orthopaedic Surgery PGY-2  [facility]  Pager: [phone]  Available by Epic Chat    While admitted, this patient will be followed by the Ortho Spine Team. Please contact below residents with any questions (available via Epic Chat).     First call: Jesus Montalvo, PGY-2   Second call: Mayo Ayoub, PGY-4  Third call: Marcos Lees, Fellow

## 2025-01-21 NOTE — PROGRESS NOTES
01/21/25 0900   Discharge Planning   Living Arrangements Spouse/significant other   Support Systems Spouse/significant other   Assistance Needed Yes   Type of Residence Private residence   Number of Stairs to Enter Residence 4   Number of Stairs Within Residence 13   Do you have animals or pets at home? Yes   Type of Animals or Pets 2 Dogs and 1 Cat   Who is requesting discharge planning? Provider   Home or Post Acute Services In home services   Type of Home Care Services Home PT;Home OT   Expected Discharge Disposition Home H   Does the patient need discharge transport arranged? No   Financial Resource Strain   How hard is it for you to pay for the very basics like food, housing, medical care, and heating? Not hard   Housing Stability   In the last 12 months, was there a time when you were not able to pay the mortgage or rent on time? N   In the past 12 months, how many times have you moved where you were living? 0   At any time in the past 12 months, were you homeless or living in a shelter (including now)? N   Transportation Needs   In the past 12 months, has lack of transportation kept you from medical appointments or from getting medications? no   In the past 12 months, has lack of transportation kept you from meetings, work, or from getting things needed for daily living? No   Patient Choice   Provider Choice list and CMS website (https://medicare.gov/care-compare#search) for post-acute Quality and Resource Measure Data were provided and reviewed with: Patient   Patient / Family choosing to utilize agency / facility established prior to hospitalization Yes   Stroke Family Assessment   Stroke Family Assessment Needed No   Intensity of Service   Intensity of Service 0-30 min     I met spoke with Ioana regarding discharge planning and home going needs. Patient states that she lives home with her  [phone] where she is independent with ADL's she does have a shower chair in the home. Patient is  medically cleared for discharge home with low intensity therapy, patient lives outside of [facility] services area external referral/orders placed. Patient's is accepted by [phone]. [place] has approved this patient for a wheeled walker which I will deliver to the patient at the bedside all paperwork will be completed/signed and placed within the [place] folder. I will follow until discharged.

## 2025-01-21 NOTE — PROGRESS NOTES
Pharmacy Medication History Review    Ioana Mckeon is a 59 y.o. female admitted for Spinal stenosis, lumbar region with neurogenic claudication. Pharmacy reviewed the patient's [id number] medications and allergies for accuracy.    The list below reflects the updated PTA list.   Prior to Admission Medications   Prescriptions Last Dose Informant   acetaminophen (Tylenol 8 HOUR) 650 mg ER tablet 1/19/2025 Self   Sig: Take 1 tablet (650 mg) by mouth every 8 hours if needed for mild pain (1 - 3). Do not crush, chew, or split.   aspirin 81 mg EC tablet Past Week Self   Sig: Take 1 tablet (81 mg) by mouth once daily.   chlorhexidine (Hibiclens) 4 % external liquid 1/20/2025 Morning Self   Sig: Use as directed daily preoperatively   chlorhexidine (Peridex) 0.12 % solution 1/20/2025 Morning Self   Sig: Swish and spit with 15ml of solution the night before and morning of surgery. Do not swallow.   elderberry fruit 350 mg capsule  Self   Sig: Take 1 capsule by mouth once daily.   ergocalciferol (Vitamin D-2) 1.25 MG (12198 UT) capsule 1/13/2025 Self   Sig: Take 1 capsule (50,000 Units) by mouth 2 times a week. On Monday and Thursday   meloxicam (Mobic) 15 mg tablet Not Taking Self   Sig: TAKE 1 TABLET BY MOUTH EVERY DAY   Patient not taking: Reported on 1/3/2025   montelukast (Singulair) 5 mg chewable tablet 1/19/2025 Self   Sig: Chew 1 tablet (5 mg) once daily at bedtime.   oxybutynin XL (Ditropan-XL) 5 mg 24 hr tablet 1/19/2025 Self   Sig: Take 1 tablet (5 mg) by mouth once daily. Do not crush, chew, or split.   pantoprazole (ProtoNix) 40 mg EC tablet 1/19/2025 Self   Sig: Take 1 tablet (40 mg) by mouth once daily in the morning. Take before meals. Do not crush, chew, or split.   rosuvastatin (Crestor) 5 mg tablet Past Week Self   Sig: Take 1 tablet (5 mg) by mouth once daily.   sucralfate (Carafate) 1 gram tablet 1/19/2025 Self   Sig: Take 1 tablet (1 g) by mouth 4 times a day before meals.       Facility-Administered Medications: None        The list below reflects the updated allergy list. Please review each documented allergy for additional clarification and justification.  Allergies  Reviewed by Annie Gonzalez RN on 1/20/2025        Severity Reactions Comments    Penicillins Not Specified Other     Sulfa (sulfonamide Antibiotics) Not Specified Hives     Tylox [oxycodone-acetaminophen] Not Specified Hives             Patient accepts M2B at discharge. Pharmacy has been updated to [facility].    Sources used to complete the med history include:    [facility]  Pharmacy dispense history  Patient interview Moderate historian  Chart Review  Care Everywhere   Office visit on 12/11    Below are additional concerns with the patient's PTA list.  Patient reports that she has enough supply of Vitamin D and Montelukast at home.  Patient is on Oxybutynin 5 mg (last pharmacy fill on 12-23 for 90 days).    Medications ADDED:  Elderberry  Medications CHANGED:  Oxybutynin from 10 mg to 5 mg daily  Vitamin D 1.25 mg to twice weekly  Medications REMOVED:   none    Kaur QuachD  Transitions of Care Pharmacist  [facility]  Please reach out via Secure Chat for questions, or if no response call [employer] or [facility]

## 2025-01-21 NOTE — PROGRESS NOTES
Physical Therapy    Physical Therapy Treatment    Patient Name: Ioana Mckeon  MRN: [id number]  Department: [place] [address]  Room: [facility]  Today's Date: 1/21/2025  Time Calculation  Start Time: 1048  Stop Time: 1127  Time Calculation (min): 39 min         Assessment/Plan   PT Assessment  PT Assessment Results: Decreased strength, Decreased endurance, Impaired balance, Decreased mobility, Orthopedic restrictions  Rehab Prognosis: Excellent  Barriers to Discharge Home: No anticipated barriers  End of Session Communication: Bedside nurse  Assessment Comment: Pt. tolerated session well. Pt. able to demonstrate safety with all mobility. Pt. will be dishcharged home with  who is helpful and supportive. Pt. also awaiting wh. walker which care coordinator will be ordering.  End of Session Patient Position: Up in chair, Alarm off, not on at start of session  PT Plan  Inpatient/Swing Bed or Outpatient: Inpatient  PT Plan  Treatment/Interventions: Bed mobility, Transfer training, Stair training, Gait training, Therapeutic exercise  PT Plan: Ongoing PT  PT Frequency: Daily  PT Discharge Recommendations: Low intensity level of continued care  Equipment Recommended upon Discharge: Wheeled walker, Bedside commode  PT Recommended Transfer Status: Stand by assist, Assistive device  PT - OK to Discharge: Yes (eval complete and discharge recommendations made)      General Visit Information:   PT  Visit  PT Received On: 01/21/25  General  Reason for Referral: s/p hardware exchange at L4/L5, L3/4 decompression and fusion on 1/20/2025 with Dr. Ayers  Past Medical History Relevant to Rehab: Per chart, PMH includes Afib, arthritis, asthma, chronic pain disorder, GERD, hyperlipidemia, hiatal hernia, nepholithiasis, OA, PONV, spinal stenosis, and L4-5 fusion  Family/Caregiver Present: Yes  Caregiver Feedback: Spouse present, supportive and receptive of therapy  Prior to Session Communication: Bedside nurse  Patient Position Received:  Bed, 3 rail up, Alarm off, not on at start of session  General Comment: Pt. supine in bed upon arrival. Pt. is pleasant and very motivated to participate. Pt. has a davol drain and spine precautions.    Subjective   Precautions:  Precautions  Medical Precautions: Fall precautions, Spinal precautions  Post-Surgical Precautions: Spinal precautions    Vital Signs (Past 2hrs)        Date/Time Vitals Session Patient Position Pulse Resp SpO2 BP MAP (mmHg)    01/21/25 1048 --  --  80  --  98 %  --  --                         Objective   Pain:  Pain Assessment  Pain Assessment: 0-10  0-10 (Numeric) Pain Score:  (Pt. reports little to no pain.)  Pain Interventions: Medication (See MAR)  Cognition:  Cognition  Overall Cognitive Status: Within Functional Limits  Orientation Level: Oriented X4  Coordination:  Movements are Fluid and Coordinated: Yes  Postural Control:  Postural Control  Postural Control: Within Functional Limits  Static Sitting Balance  Static Sitting-Balance Support: Bilateral upper extremity supported, Feet supported  Static Sitting-Level of Assistance:  (supervision)  Static Standing Balance  Static Standing-Balance Support: Bilateral upper extremity supported (ww)  Static Standing-Level of Assistance:  (SBA for cues for positioning.)  Extremity/Trunk Assessments:                      Activity Tolerance:  Activity Tolerance  Endurance: Endurance does not limit participation in activity  Treatments:  Therapeutic Exercise  Therapeutic Exercise Performed: Yes  Therapeutic Exercise Activity 1: Pt. performed standing bilat l e ex with hands flat on tray table, Marches, Heel Raises and Abd x 15 reps.    Therapeutic Activity  Therapeutic Activity Performed: Yes  Therapeutic Activity 1: Pt. amb. to and from bathroom using a wh. walker and sba - Pt. transferred to toilet with light cga to steady. Pt. performed jenna care with distant supervision.    Bed Mobility  Bed Mobility: Yes  Bed Mobility 1  Bed Mobility 1:   "(Rolling left sba for cues/reminders sidelying to sit sba.)    Ambulation/Gait Training  Ambulation/Gait Training Performed: Yes  Ambulation/Gait Training 1  Surface 1:  (Pt. amb. 150' x 2 using a wh. walker and sba for cues for positioning.)  Transfers  Transfer: Yes  Transfer 1  Transfer From 1: Bed to  Transfer to 1: Stand  Transfer Level of Assistance 1: Close supervision  Transfers 2  Transfer From 2: Stand to  Transfer to 2: Sit  Transfer Level of Assistance 2: Close supervision    Stairs  Stairs: Yes  Stairs  Rails 1:  (Pt. ascended and descended 1 6\" platform step with wh. walker and close sba to ascending and cga to descend - cues for  positioning.)  Stairs 2  Rails 2:  (Pt. ascended and descended 12 steps with rail on left ascending and left descending - Pt. required sba to ascend and Pt. given cga to descend. Non reciprocal as instructed.)    Outcome Measures:  [facility] Basic Mobility  Turning from your back to your side while in a flat bed without using bedrails: A little  Moving from lying on your back to sitting on the side of a flat bed without using bedrails: A little  Moving to and from bed to chair (including a wheelchair): A little  Standing up from a chair using your arms (e.g. wheelchair or bedside chair): A little  To walk in hospital room: A little  Climbing 3-5 steps with railing: A little  Basic Mobility - Total Score: 18    Education Documentation  Precautions, taught by Dorys Salvador PTA at 1/21/2025 11:44 AM.  Learner: Patient  Readiness: Acceptance  Method: Explanation, Demonstration  Response: Needs Reinforcement  Comment: Discussed car transfer technique- Discussed log roll importance.    Body Mechanics, taught by Dorys Salvador PTA at 1/21/2025 11:44 AM.  Learner: Patient  Readiness: Acceptance  Method: Explanation, Demonstration  Response: Needs Reinforcement  Comment: Discussed car transfer technique- Discussed log roll importance.    Mobility Training, taught by Dorys Salvador PTA at " 1/21/2025 11:44 AM.  Learner: Patient  Readiness: Acceptance  Method: Explanation, Demonstration  Response: Needs Reinforcement  Comment: Discussed car transfer technique- Discussed log roll importance.    Education Comments  No comments found.        OP EDUCATION:       Encounter Problems       Encounter Problems (Active)       Balance       Pt will maintain static/dynamic standing balance x4 minutes with RW and modified independence to demonstrate improved balance       Start:  01/20/25    Expected End:  02/03/25               Mobility       Pt will complete bed mobility independently via log roll technique (Progressing)       Start:  01/20/25    Expected End:  02/03/25            Pt will amb >250ft with RW and modified independence in prep for safe discharge home  (Progressing)       Start:  01/20/25    Expected End:  02/03/25            Pt will a/descend 12 steps with L rail in prep for safe home entry and to access bed/bathroom (Met)       Start:  01/20/25    Expected End:  02/03/25    Resolved:  01/21/25            PT Transfers       Pt will transfer sit to stand with RW and modified independence in prep for out of bed mobility  (Progressing)       Start:  01/20/25    Expected End:  02/03/25               Pain - Adult

## 2025-01-21 NOTE — SIGNIFICANT EVENT
RN expressed concerns regarding code status in chart and stated patient would like to be DNR/DNI.    Spoke with patient over phone regarding code status. Patient was alert and oriented x3. Patient expressed wishes for code status to be changed to DNR/DNI. She would not like compressions or intubation in the event of a life-saving emergency.    All questions were answered and code status was updated in the chart accordingly.    Jesus Montalvo MD  Orthopedic Surgery PGY-2  [facility]  Pager: [phone]  Available by Epic Chat    While admitted, this patient will be followed by the Ortho Spine Team. Please contact below residents with any questions (available via Epic Chat).     First call: Jesus Montalvo, PGY-2   Second call: aMyo Ayoub, PGY-4  Third call: Marcos Lees, Fellow

## 2025-01-21 NOTE — CARE PLAN
The patient's goals for the shift include  progress for discharge     The clinical goals for the shift include mobility without pain        Problem: Pain  Goal: Takes deep breaths with improved pain control throughout the shift  Outcome: Progressing  Goal: Turns in bed with improved pain control throughout the shift  Outcome: Progressing  Goal: Walks with improved pain control throughout the shift  Outcome: Progressing  Goal: Performs ADL's with improved pain control throughout shift  Outcome: Progressing  Goal: Participates in PT with improved pain control throughout the shift  Outcome: Progressing  Goal: Free from opioid side effects throughout the shift  Outcome: Progressing  Goal: Free from acute confusion related to pain meds throughout the shift  Outcome: Progressing     Problem: Risk for falls  Goal: I will remain free from falls  Outcome: Progressing     Problem: Pain - Adult  Goal: Verbalizes/displays adequate comfort level or baseline comfort level  Outcome: Progressing     Problem: Safety - Adult  Goal: Free from fall injury  Outcome: Progressing  Flowsheets (Taken 1/21/2025 0522)  Free from fall injury: Instruct family/caregiver on patient safety     Problem: Discharge Planning  Goal: Discharge to home or other facility with appropriate resources  Outcome: Progressing  Flowsheets (Taken 1/21/2025 0522)  Discharge to home or other facility with appropriate resources:   Identify discharge learning needs (meds, wound care, etc)   Identify barriers to discharge with patient and caregiver     Problem: Chronic Conditions and Co-morbidities  Goal: Patient's chronic conditions and co-morbidity symptoms are monitored and maintained or improved  Outcome: Progressing

## 2025-01-21 NOTE — CARE PLAN
The patient's goals for the shift include pt will rate pain 3/10 or less -progressing    The clinical goals for the shift include pt will remain safe and utilize call light -met

## 2025-01-21 NOTE — PROGRESS NOTES
Occupational Therapy    Evaluation/Discharge Note    No further OT needs indicated at this time. Will sign off.    Patient Name: Ioana Mckeon  MRN: [id number]  Department: [place] [address]  Room: [address] [place] [address]  Today's Date: 1/21/2025  Time Calculation  Start Time: 0831  Stop Time: 0843  Time Calculation (min): 12 min        Assessment:  Prognosis: Good  Barriers to Discharge Home: No anticipated barriers  Evaluation/Treatment Tolerance: Patient tolerated treatment well  Medical Staff Made Aware: Yes  End of Session Communication: Bedside nurse  End of Session Patient Position: Up in chair, Alarm off, not on at start of session  OT Assessment Results: Decreased ADL status, Decreased functional mobility  Prognosis: Good  Barriers to Discharge: None  Evaluation/Treatment Tolerance: Patient tolerated treatment well  Medical Staff Made Aware: Yes  Strengths: Attitude of self, Ability to acquire knowledge, Support of Caregivers, Premorbid level of function  Plan:  No Skilled OT: No acute OT goals identified  OT Frequency: OT eval only  OT Discharge Recommendations: No further acute OT, No OT needed after discharge  Equipment Recommended upon Discharge: Wheeled walker, Bedside commode  OT Recommended Transfer Status: Stand by assist  OT - OK to Discharge:  (eval complete)       Subjective   Current Problem:  1. Spinal stenosis, lumbar region with neurogenic claudication  acetaminophen (Tylenol Extra Strength) 500 mg tablet    polyethylene glycol (Miralax) 17 gram/dose powder    oxyCODONE (Roxicodone) 5 mg immediate release tablet    methocarbamol (Robaxin) 500 mg tablet    Wheeled Folding Walker    Referral to Home Health    CANCELED: Referral to Home Health      2. Lumbar radiculopathy        3. Acute postoperative pain  oxyCODONE (Roxicodone) 5 mg immediate release tablet        General:  General  Reason for Referral: s/p hardware exchange at L4/L5, L3/4 decompression and fusion on 1/20/2025 with Dr. Ayers  Past Medical History  Relevant to Rehab: Per chart, PMH includes Afib, arthritis, asthma, chronic pain disorder, GERD, hyperlipidemia, hiatal hernia, nepholithiasis, OA, PONV, spinal stenosis, and L4-5 fusion  Family/Caregiver Present: Yes  Caregiver Feedback: Spouse present, supportive and receptive of therapy  Patient Position Received: Bed, 3 rail up, Alarm off, not on at start of session  Preferred Learning Style: visual, verbal  General Comment: Pt pleasant and motivated to participate in therapy  Precautions:  Medical Precautions: Fall precautions, Spinal precautions  Post-Surgical Precautions: Spinal precautions    Pain:  Pain Assessment  Pain Assessment: 0-10  0-10 (Numeric) Pain Score: 0 - No pain    Objective   Cognition:  Overall Cognitive Status: Within Functional Limits  Orientation Level: Oriented X4           Home Living:  Type of Home: House  Lives With: Spouse  Home Adaptive Equipment: None  Home Layout: Two level, Bed/bath upstairs, Stairs to alternate level with rails  Alternate Level Stairs-Rails: Left  Alternate Level Stairs-Number of Steps: 12  Home Access: Stairs to enter with rails  Entrance Stairs-Rails: Left  Entrance Stairs-Number of Steps: 1 step no rails, 3 steps L rail  Bathroom Shower/Tub: Tub/shower unit  Bathroom Equipment:  (shower chair)  Prior Function:  Level of Green: Independent with ADLs and functional transfers, Independent with homemaking with ambulation  ADL Assistance: Independent  Homemaking Assistance: Independent  Ambulatory Assistance: Independent  Prior Function Comments: +driving, -falls    ADL:  Eating Assistance: Independent  Grooming Assistance: Independent  Grooming Deficit:  (anticipated, pt declined to complete)  Bathing Assistance: Minimal  Bathing Deficit:  (anticipated)  UE Dressing Assistance: Minimal  UE Dressing Deficit:  (gown management)  LE Dressing Assistance: Minimal  LE Dressing Deficit:  (limited functional reach towards R foot)  Toileting Assistance with Device:  Stand by  Toileting Deficit:  (anticipated, pt declined need)  ADL Comments: Pt educated on spinal precautions pertaining to ADLs and functional activity. Verbalized understanding  Activity Tolerance:  Endurance: Tolerates 30 min exercise with multiple rests  Bed Mobility/Transfers: Bed Mobility  Bed Mobility: Yes  Bed Mobility 1  Bed Mobility 1: Supine to sitting  Level of Assistance 1: Close supervision  Bed Mobility Comments 1: Utilized log rolling technique    Transfers  Transfer: Yes  Transfer 1  Transfer From 1: Bed to  Transfer to 1: Sit, Stand  Technique 1: Sit to stand, Stand to sit  Transfer Device 1: Walker  Transfer Level of Assistance 1: Close supervision  Trials/Comments 1: Minimal cueing for hand placement      Functional Mobility:  Functional Mobility  Functional Mobility Performed: Yes  Functional Mobility 1  Comments 1: Pt completed funcitonal household distance with supervision +FWW, encouraged pacing for endurance purposes  Sitting Balance:  Static Sitting Balance  Static Sitting-Balance Support: No upper extremity supported  Static Sitting-Level of Assistance: Independent  Dynamic Sitting Balance  Dynamic Sitting-Balance Support: No upper extremity supported  Dynamic Sitting-Level of Assistance: Independent  Standing Balance:  Static Standing Balance  Static Standing-Balance Support: Bilateral upper extremity supported  Static Standing-Level of Assistance: Distant supervision  Dynamic Standing Balance  Dynamic Standing-Balance Support: Bilateral upper extremity supported  Dynamic Standing-Level of Assistance: Close supervision     Vision:Vision - Basic Assessment  Current Vision: Wears glasses all the time  Sensation:  Light Touch: No apparent deficits  Strength:  Strength Comments: BUE strength grossly WFL  Perception:  Inattention/Neglect: Appears intact  Initiation: Appears intact  Motor Planning: Appears intact  Perseveration: Not present  Coordination:  Movements are Fluid and Coordinated:  Yes   Hand Function:  Gross Grasp: Functional  Coordination: Functional  Extremities: RUE   RUE : Within Functional Limits and LUE   LUE: Within Functional Limits    Outcome Measures:[facility] Daily Activity  Putting on and taking off regular lower body clothing: A little  Bathing (including washing, rinsing, drying): A little  Putting on and taking off regular upper body clothing: A little  Toileting, which includes using toilet, bedpan or urinal: A little  Taking care of personal grooming such as brushing teeth: None  Eating Meals: None  Daily Activity - Total Score: 20     Brief Confusion Assessment Method (bCAM)  Feature 1: Altered Mental Status or Fluctuating Course: No  CAM Result: CAM -    EDUCATION:  Education  Individual(s) Educated: Patient, Spouse  Education Provided: Diagnosis & Precautions, Fall precautons, Risk and benefits of OT discussed with patient or other, POC discussed and agreed upon  Patient Response to Education: Patient/Caregiver Verbalized Understanding of Information

## 2025-01-22 PROCEDURE — 2500000002 HC RX 250 W HCPCS SELF ADMINISTERED DRUGS (ALT 637 FOR MEDICARE OP, ALT 636 FOR OP/ED)

## 2025-01-22 PROCEDURE — 97530 THERAPEUTIC ACTIVITIES: CPT | Mod: GP,CQ

## 2025-01-22 PROCEDURE — 97116 GAIT TRAINING THERAPY: CPT | Mod: GP,CQ

## 2025-01-22 PROCEDURE — 2500000001 HC RX 250 WO HCPCS SELF ADMINISTERED DRUGS (ALT 637 FOR MEDICARE OP)

## 2025-01-22 PROCEDURE — 1100000001 HC PRIVATE ROOM DAILY

## 2025-01-22 PROCEDURE — 2500000004 HC RX 250 GENERAL PHARMACY W/ HCPCS (ALT 636 FOR OP/ED): Mod: JZ,TB

## 2025-01-22 RX ADMIN — SUCRALFATE 1 G: 1 TABLET ORAL at 15:43

## 2025-01-22 RX ADMIN — ROSUVASTATIN 5 MG: 5 TABLET, FILM COATED ORAL at 09:38

## 2025-01-22 RX ADMIN — SUCRALFATE 1 G: 1 TABLET ORAL at 08:14

## 2025-01-22 RX ADMIN — OXYBUTYNIN CHLORIDE 5 MG: 5 TABLET ORAL at 09:38

## 2025-01-22 RX ADMIN — METHOCARBAMOL 1000 MG: 500 TABLET ORAL at 15:43

## 2025-01-22 RX ADMIN — OXYBUTYNIN CHLORIDE 5 MG: 5 TABLET ORAL at 20:33

## 2025-01-22 RX ADMIN — MONTELUKAST SODIUM 5 MG: 5 TABLET, CHEWABLE ORAL at 20:33

## 2025-01-22 RX ADMIN — ACETAMINOPHEN 975 MG: 325 TABLET ORAL at 20:33

## 2025-01-22 RX ADMIN — SUCRALFATE 1 G: 1 TABLET ORAL at 11:03

## 2025-01-22 RX ADMIN — PANTOPRAZOLE SODIUM 20 MG: 20 TABLET, DELAYED RELEASE ORAL at 06:11

## 2025-01-22 RX ADMIN — METHOCARBAMOL 1000 MG: 500 TABLET ORAL at 20:33

## 2025-01-22 RX ADMIN — SUCRALFATE 1 G: 1 TABLET ORAL at 20:33

## 2025-01-22 RX ADMIN — CEFAZOLIN SODIUM 2 G: 2 INJECTION, SOLUTION INTRAVENOUS at 05:16

## 2025-01-22 RX ADMIN — ACETAMINOPHEN 975 MG: 325 TABLET ORAL at 05:23

## 2025-01-22 RX ADMIN — ACETAMINOPHEN 975 MG: 325 TABLET ORAL at 13:27

## 2025-01-22 RX ADMIN — METHOCARBAMOL 1000 MG: 500 TABLET ORAL at 09:37

## 2025-01-22 ASSESSMENT — COGNITIVE AND FUNCTIONAL STATUS - GENERAL
MOVING TO AND FROM BED TO CHAIR: A LITTLE
TURNING FROM BACK TO SIDE WHILE IN FLAT BAD: A LITTLE
TOILETING: A LITTLE
DRESSING REGULAR LOWER BODY CLOTHING: A LOT
WALKING IN HOSPITAL ROOM: A LITTLE
MOVING FROM LYING ON BACK TO SITTING ON SIDE OF FLAT BED WITH BEDRAILS: A LITTLE
DRESSING REGULAR UPPER BODY CLOTHING: A LOT
WALKING IN HOSPITAL ROOM: A LITTLE
MOBILITY SCORE: 17
CLIMB 3 TO 5 STEPS WITH RAILING: A LOT
STANDING UP FROM CHAIR USING ARMS: A LITTLE
STANDING UP FROM CHAIR USING ARMS: A LITTLE
MOVING TO AND FROM BED TO CHAIR: A LITTLE
MOVING FROM LYING ON BACK TO SITTING ON SIDE OF FLAT BED WITH BEDRAILS: A LITTLE
CLIMB 3 TO 5 STEPS WITH RAILING: A LITTLE
MOBILITY SCORE: 18
DAILY ACTIVITIY SCORE: 18
TURNING FROM BACK TO SIDE WHILE IN FLAT BAD: A LITTLE
HELP NEEDED FOR BATHING: A LITTLE

## 2025-01-22 ASSESSMENT — PAIN - FUNCTIONAL ASSESSMENT
PAIN_FUNCTIONAL_ASSESSMENT: 0-10

## 2025-01-22 ASSESSMENT — PAIN SCALES - GENERAL
PAINLEVEL_OUTOF10: 0 - NO PAIN

## 2025-01-22 NOTE — CARE PLAN
The patient's goals for the shift include  to go home    The clinical goals for the shift include Patient will remain safe and free from injury throughout shift      Problem: Pain  Goal: Takes deep breaths with improved pain control throughout the shift  Outcome: Progressing  Goal: Turns in bed with improved pain control throughout the shift  Outcome: Progressing  Goal: Walks with improved pain control throughout the shift  Outcome: Progressing  Goal: Performs ADL's with improved pain control throughout shift  Outcome: Progressing  Goal: Participates in PT with improved pain control throughout the shift  Outcome: Progressing  Goal: Free from opioid side effects throughout the shift  Outcome: Progressing  Goal: Free from acute confusion related to pain meds throughout the shift  Outcome: Progressing     Problem: Risk for falls  Goal: I will remain free from falls  Outcome: Progressing     Problem: Pain - Adult  Goal: Verbalizes/displays adequate comfort level or baseline comfort level  Outcome: Progressing     Problem: Safety - Adult  Goal: Free from fall injury  Outcome: Progressing     Problem: Discharge Planning  Goal: Discharge to home or other facility with appropriate resources  Outcome: Progressing     Problem: Chronic Conditions and Co-morbidities  Goal: Patient's chronic conditions and co-morbidity symptoms are monitored and maintained or improved  Outcome: Progressing     Problem: Skin  Goal: Promote skin healing  Flowsheets (Taken 1/22/2025 1002)  Promote skin healing: Assess skin/pad under line(s)/device(s)

## 2025-01-22 NOTE — PROGRESS NOTES
"Orthopaedic Surgery Progress Note    Subjective: Evaluated on regular nursing floor. Pain very well controlled. Denies chest pain, shortness of breath, or fevers. Tolerating diet. BM yesterday. Urinating well.    Ambulated with PT and rec'd HHC.    Objective:  /64   Pulse 68   Temp 36.1 °C (97 °F)   Resp 17   Ht 1.651 m (5' 5\")   Wt 85.8 kg (189 lb 2.5 oz)   SpO2 94%   BMI 31.48 kg/m²     Gen: arousable, NAD, appropriately conversational  Cardiac: RRR to peripheral palpation  Resp: nonlabored on RA  GI: soft, nondistended    MSK:  Spine Exam:  Drain in place holding suction, SS output  Surgical dressing clean, dry, and intact    L1: SILT       L2: SILT      Hip flexors 5/5 Left; 5/5 Right  L3: SILT      Knee extension 5/5 Left; 5/5 Right  L4: SILT      Tib Ant. (Dorsiflexion) 5/5 Left; 5/5 Right  L5: SILT      EHL 5/5 Left; 5/5 Right  S1: SILT      Plantarflexion 5/5 Left; 5/5 Right    Assessment/Plan: 59 y.o. female PMHx prior L4-5 fusion s/p hardware exchange at L4/L5, L3/4 decompression and fusion on 1/20/2025 with Dr. Ayers.      Plan:  Plan:  - Weight-bearing status: Activity as tolerated; no excessive twisting, turning, or bending    - Feeding: Regular diet as tolerated, bowel regimen  - Analgesia: Tylenol 650mg, oxy 5mg/10mg, Dilaudid 0.4mg, Robaxin prn, dexamethasone 10 mg IV q8h for 3 doses   - Volume: mIV @ at  cc/hr, HLIVF when tolerating PO, monitor BMP  - OT/PT: Consulted, recommended HHC  - Respiratory: Encourage IS, maintain O2 sat >92%  - Infection: Sivan-operative Ancef for 48 hours, afebrile   - Lines: Maintain PIVx2 while inpatient  - Drains: Maintain HV drain, monitor and record output q8 hrs  - Gunn: None   - Embolic ppx: SCDs, early mobility    - Transfusion: no indication for transfusion  - Cardiac: No issues  - Glycemic: no glycemic issues  - C/w home medications      Dispo HHC pending drain removal    Plan was discussed with attending Dr. Armen Montalvo, " MD  Orthopaedic Surgery PGY-2  [facility]  Pager: [phone]  Available by Epic Chat    While admitted, this patient will be followed by the Ortho Spine Team. Please contact below residents with any questions (available via Epic Chat).     First call: Jesus Montalvo, PGY-2   Second call: Mayo Ayoub, PGY-4  Third call: Marcos Lees, Fellow

## 2025-01-22 NOTE — PROGRESS NOTES
Physical Therapy    Physical Therapy Treatment    Patient Name: Ioana Mckeon  MRN: [id number]  Department: [place] [address]  Room: [address] [place] [address]  Today's Date: 1/22/2025  Time Calculation  Start Time: 0921  Stop Time: 0945  Time Calculation (min): 24 min         Assessment/Plan   PT Assessment  PT Assessment Results: Decreased strength, Decreased endurance, Impaired balance, Decreased mobility, Orthopedic restrictions  Rehab Prognosis: Excellent  Barriers to Discharge Home: No anticipated barriers  End of Session Communication: Bedside nurse  Assessment Comment: Pt. tolerated session well without complaints. Pt. has 1 davol drain in place. Pt.'s  in room is very supportive. Pt. will possibly leave today after drain removed. Pt. has been recommended for continued therapy at a low level of intensity post acute.  End of Session Patient Position: Up in chair, Alarm off, not on at start of session  PT Plan  Inpatient/Swing Bed or Outpatient: Inpatient  PT Plan  Treatment/Interventions: Bed mobility, Transfer training, Stair training, Gait training, Therapeutic exercise  PT Plan: Ongoing PT  PT Frequency: Daily  PT Discharge Recommendations: Low intensity level of continued care  Equipment Recommended upon Discharge: Wheeled walker, Bedside commode  PT Recommended Transfer Status: Stand by assist  PT - OK to Discharge: Yes (eval complete and discharge recommendations made)      General Visit Information:   PT  Visit  PT Received On: 01/22/25  General  Reason for Referral: s/p hardware exchange at L4/L5, L3/4 decompression and fusion on 1/20/2025 with Dr. Ayers  Past Medical History Relevant to Rehab: Per chart, PMH includes Afib, arthritis, asthma, chronic pain disorder, GERD, hyperlipidemia, hiatal hernia, nepholithiasis, OA, PONV, spinal stenosis, and L4-5 fusion  Family/Caregiver Present: Yes  Caregiver Feedback: Spouse present, supportive and receptive of therapy  Prior to Session Communication: Bedside nurse  Patient  "Position Received: Up in chair, Alarm on  General Comment: Pt. seated in bedside chair upon arrival. Pt. is pleasant and willing to participate(very motivated) Pt. reports no pain except the tape on the wound pulling.    Subjective   Precautions:  Precautions  Medical Precautions: Fall precautions, Spinal precautions  Post-Surgical Precautions: Spinal precautions            Objective   Pain:  Pain Assessment  Pain Assessment: 0-10  0-10 (Numeric) Pain Score: 0 - No pain  Pain Interventions: Medication (See MAR)  Cognition:  Cognition  Overall Cognitive Status: Within Functional Limits  Orientation Level: Oriented X4  Coordination:  Movements are Fluid and Coordinated: Yes  Postural Control:  Postural Control  Postural Control: Within Functional Limits  Static Sitting Balance  Static Sitting-Balance Support: Bilateral upper extremity supported, Feet supported  Static Sitting-Level of Assistance:  (supervision)  Static Standing Balance  Static Standing-Balance Support: No upper extremity supported  Static Standing-Level of Assistance: Close supervision  Extremity/Trunk Assessments:                      Activity Tolerance:  Activity Tolerance  Endurance: Endurance does not limit participation in activity  Treatments:  Ambulation/Gait Training  Ambulation/Gait Training Performed: Yes  Ambulation/Gait Training 1  Surface 1:  (Pt. amb. 200' x 1 and 50' x 1 no device /supervision.)  Transfers  Transfer: Yes  Transfer 1  Transfer From 1: Chair with arms to  Transfer to 1: Stand  Transfer Level of Assistance 1: Distant supervision  Transfers 2  Transfer From 2: Stand to  Transfer to 2: Sit  Transfer Level of Assistance 2:  (Supervision)    Stairs  Stairs: Yes  Stairs  Rails 1:  (Pt. ascended and descended 4 steps with hand rail on left - non reciprocal with sba)  Stairs 2  Rails 2:  (Pt. ascended and descended 12 steps with unilateral ue support supervision)  Stairs 3  Rails 3:  (Pt. ascended and descended 1 6\" platform step " with cga/hand held assist.)    Outcome Measures:  [facility] Basic Mobility  Turning from your back to your side while in a flat bed without using bedrails: A little  Moving from lying on your back to sitting on the side of a flat bed without using bedrails: A little  Moving to and from bed to chair (including a wheelchair): A little  Standing up from a chair using your arms (e.g. wheelchair or bedside chair): A little  To walk in hospital room: A little  Climbing 3-5 steps with railing: A little  Basic Mobility - Total Score: 18    Education Documentation  Precautions, taught by Dorys Salvador PTA at 1/22/2025 10:01 AM.  Learner: Patient  Readiness: Acceptance  Method: Explanation, Demonstration  Response: Demonstrated Understanding, Verbalizes Understanding    Body Mechanics, taught by Dorys Salvador PTA at 1/22/2025 10:01 AM.  Learner: Patient  Readiness: Acceptance  Method: Explanation, Demonstration  Response: Demonstrated Understanding, Verbalizes Understanding    Mobility Training, taught by Dorys Salvador PTA at 1/22/2025 10:01 AM.  Learner: Patient  Readiness: Acceptance  Method: Explanation, Demonstration  Response: Demonstrated Understanding, Verbalizes Understanding    Education Comments  No comments found.        OP EDUCATION:       Encounter Problems       Encounter Problems (Active)       Mobility       Pt will complete bed mobility independently via log roll technique (Progressing)       Start:  01/20/25    Expected End:  02/03/25            Pt will amb >250ft with RW and modified independence in prep for safe discharge home  (Progressing)       Start:  01/20/25    Expected End:  02/03/25            Pt will a/descend 12 steps with L rail in prep for safe home entry and to access bed/bathroom (Met)       Start:  01/20/25    Expected End:  02/03/25    Resolved:  01/21/25            PT Transfers       Pt will transfer sit to stand with RW and modified independence in prep for out of bed mobility  (Progressing)        Start:  01/20/25    Expected End:  02/03/25               Pain - Adult             Encounter Problems (Resolved)       Balance       Pt will maintain static/dynamic standing balance x4 minutes with RW and modified independence to demonstrate improved balance (Met)       Start:  01/20/25    Expected End:  02/03/25    Resolved:  01/22/25

## 2025-01-22 NOTE — CARE PLAN
The patient's goals for the shift include      The clinical goals for the shift include The patient will remain safe during this shift    Over the shift, the patient did not make progress toward the following goals. Barriers to progression include . Recommendations to address these barriers include   Problem: Pain  Goal: Takes deep breaths with improved pain control throughout the shift  Outcome: Progressing  Goal: Turns in bed with improved pain control throughout the shift  Outcome: Progressing  Goal: Walks with improved pain control throughout the shift  Outcome: Progressing  Goal: Performs ADL's with improved pain control throughout shift  Outcome: Progressing  Goal: Participates in PT with improved pain control throughout the shift  Outcome: Progressing  Goal: Free from opioid side effects throughout the shift  Outcome: Progressing  Goal: Free from acute confusion related to pain meds throughout the shift  Outcome: Progressing     Problem: Risk for falls  Goal: I will remain free from falls  Outcome: Progressing     Problem: Pain - Adult  Goal: Verbalizes/displays adequate comfort level or baseline comfort level  Outcome: Progressing     Problem: Safety - Adult  Goal: Free from fall injury  Outcome: Progressing     Problem: Discharge Planning  Goal: Discharge to home or other facility with appropriate resources  Outcome: Progressing     Problem: Chronic Conditions and Co-morbidities  Goal: Patient's chronic conditions and co-morbidity symptoms are monitored and maintained or improved  Outcome: Progressing   .

## 2025-01-23 ENCOUNTER — PHARMACY VISIT (OUTPATIENT)
Dept: PHARMACY | Facility: CLINIC | Age: 60
End: 2025-01-23
Payer: COMMERCIAL

## 2025-01-23 VITALS
HEART RATE: 74 BPM | OXYGEN SATURATION: 96 % | DIASTOLIC BLOOD PRESSURE: 67 MMHG | SYSTOLIC BLOOD PRESSURE: 108 MMHG | BODY MASS INDEX: 31.52 KG/M2 | WEIGHT: 189.15 LBS | RESPIRATION RATE: 15 BRPM | TEMPERATURE: 98.2 F | HEIGHT: 65 IN

## 2025-01-23 PROCEDURE — 2500000002 HC RX 250 W HCPCS SELF ADMINISTERED DRUGS (ALT 637 FOR MEDICARE OP, ALT 636 FOR OP/ED)

## 2025-01-23 PROCEDURE — 97110 THERAPEUTIC EXERCISES: CPT | Mod: GP,CQ

## 2025-01-23 PROCEDURE — 97116 GAIT TRAINING THERAPY: CPT | Mod: GP,CQ

## 2025-01-23 PROCEDURE — 2500000001 HC RX 250 WO HCPCS SELF ADMINISTERED DRUGS (ALT 637 FOR MEDICARE OP)

## 2025-01-23 RX ORDER — POLYETHYLENE GLYCOL 3350 17 G/17G
17 POWDER, FOR SOLUTION ORAL 2 TIMES DAILY PRN
Qty: 510 G | Refills: 0 | Status: SHIPPED | OUTPATIENT
Start: 2025-01-23

## 2025-01-23 RX ORDER — OXYCODONE HYDROCHLORIDE 5 MG/1
5 TABLET ORAL EVERY 4 HOURS PRN
Qty: 42 TABLET | Refills: 0 | Status: SHIPPED | OUTPATIENT
Start: 2025-01-23

## 2025-01-23 RX ORDER — ACETAMINOPHEN 500 MG
1000 TABLET ORAL EVERY 8 HOURS
Qty: 180 TABLET | Refills: 0 | Status: SHIPPED | OUTPATIENT
Start: 2025-01-23 | End: 2025-02-22

## 2025-01-23 RX ORDER — METHOCARBAMOL 500 MG/1
TABLET, FILM COATED ORAL
Qty: 60 TABLET | Refills: 2 | Status: SHIPPED | OUTPATIENT
Start: 2025-01-23

## 2025-01-23 RX ADMIN — PANTOPRAZOLE SODIUM 20 MG: 20 TABLET, DELAYED RELEASE ORAL at 06:18

## 2025-01-23 RX ADMIN — METHOCARBAMOL 1000 MG: 500 TABLET ORAL at 08:52

## 2025-01-23 RX ADMIN — SUCRALFATE 1 G: 1 TABLET ORAL at 13:54

## 2025-01-23 RX ADMIN — SUCRALFATE 1 G: 1 TABLET ORAL at 06:19

## 2025-01-23 RX ADMIN — ACETAMINOPHEN 975 MG: 325 TABLET ORAL at 06:18

## 2025-01-23 RX ADMIN — ACETAMINOPHEN 975 MG: 325 TABLET ORAL at 13:52

## 2025-01-23 RX ADMIN — OXYBUTYNIN CHLORIDE 5 MG: 5 TABLET ORAL at 08:51

## 2025-01-23 RX ADMIN — ROSUVASTATIN 5 MG: 5 TABLET, FILM COATED ORAL at 08:51

## 2025-01-23 ASSESSMENT — COGNITIVE AND FUNCTIONAL STATUS - GENERAL
MOVING TO AND FROM BED TO CHAIR: A LITTLE
WALKING IN HOSPITAL ROOM: A LITTLE
TURNING FROM BACK TO SIDE WHILE IN FLAT BAD: A LITTLE
MOVING FROM LYING ON BACK TO SITTING ON SIDE OF FLAT BED WITH BEDRAILS: A LITTLE
STANDING UP FROM CHAIR USING ARMS: A LITTLE
DAILY ACTIVITIY SCORE: 24
CLIMB 3 TO 5 STEPS WITH RAILING: A LITTLE
MOBILITY SCORE: 18
MOBILITY SCORE: 24

## 2025-01-23 ASSESSMENT — PAIN SCALES - GENERAL
PAINLEVEL_OUTOF10: 0 - NO PAIN
PAINLEVEL_OUTOF10: 3

## 2025-01-23 ASSESSMENT — PAIN SCALES - PAIN ASSESSMENT IN ADVANCED DEMENTIA (PAINAD): TOTALSCORE: MEDICATION (SEE MAR)

## 2025-01-23 ASSESSMENT — PAIN - FUNCTIONAL ASSESSMENT: PAIN_FUNCTIONAL_ASSESSMENT: 0-10

## 2025-01-23 NOTE — PROGRESS NOTES
Physical Therapy    Physical Therapy Treatment    Patient Name: Ioana Mckeon  MRN: [id number]  Department: [place] [address]  Room: [facility]  Today's Date: 1/23/2025  Time Calculation  Start Time: 0807  Stop Time: 0830  Time Calculation (min): 23 min         Assessment/Plan   PT Assessment  PT Assessment Results: Decreased strength, Decreased endurance, Impaired balance, Decreased mobility, Orthopedic restrictions  Rehab Prognosis: Excellent  Barriers to Discharge Home: No anticipated barriers  End of Session Communication: Bedside nurse  Assessment Comment: Pt tolerated today's session well. Pt was able to increase challenge of standing ther ex activities by removing UE support for most activities and increasing amount of reps for standing hip abd activity. Pt was able to do curb step activity with close supervision assist rather than hand held assistance. Pt will possibly leave today after drain removal and is recommended for continued low leve therapy intensity post acute.  End of Session Patient Position: Up in chair, Alarm off, caregiver present  PT Plan  Inpatient/Swing Bed or Outpatient: Inpatient  PT Plan  Treatment/Interventions: Bed mobility, Transfer training, Gait training, Stair training, Therapeutic activity, Therapeutic exercise  PT Plan: Ongoing PT  PT Frequency: Daily  PT Discharge Recommendations: Low intensity level of continued care  Equipment Recommended upon Discharge: Wheeled walker, Bedside commode  PT Recommended Transfer Status: Stand by assist  PT - OK to Discharge: Yes (eval complete and discharge recommendations made)      General Visit Information:   PT  Visit  PT Received On: 01/23/25  General  Reason for Referral: s/p hardware exchange at L4/L5, L3/4 decompression and fusion on 1/20/2025 with Dr. Ayers  Past Medical History Relevant to Rehab: Per chart, PMH includes Afib, arthritis, asthma, chronic pain disorder, GERD, hyperlipidemia, hiatal hernia, nepholithiasis, OA, PONV, spinal  "stenosis, and L4-5 fusion  Family/Caregiver Present: Yes  Caregiver Feedback: Spouse present, supportive and receptive of therapy  Prior to Session Communication: Bedside nurse  Patient Position Received: Up in chair  Preferred Learning Style: visual, verbal  General Comment: Pt seated in chair upon arrival with  present. Pt is pleasant and was compliant with today's treatment session. Pt mentioned todays lumbar spine pain is at a 0, but the tape keeps \"pulling\" on her wound and is irritating her.    Subjective   Precautions:  Precautions  Medical Precautions: Fall precautions, Spinal precautions  Post-Surgical Precautions: Spinal precautions     Date/Time Vitals Session Patient Position Pulse Resp SpO2 BP MAP (mmHg)    01/23/25 0756 --  --  81  18  98 %  112/74  --                 Objective   Pain:  Pain Assessment  Pain Assessment: 0-10  0-10 (Numeric) Pain Score: 0 - No pain  Cognition:  Cognition  Orientation Level: Oriented X4  Coordination:  Movements are Fluid and Coordinated: Yes  Postural Control:  Postural Control  Postural Control: Within Functional Limits  Static Sitting Balance  Static Sitting-Balance Support: Bilateral upper extremity supported, Feet supported  Static Sitting-Level of Assistance: Close supervision  Static Standing Balance  Static Standing-Balance Support: No upper extremity supported  Static Standing-Level of Assistance:  (Supervision)  Extremity/Trunk Assessments:                      Activity Tolerance:     Treatments:  Therapeutic Exercise  Therapeutic Exercise Performed: Yes  Therapeutic Exercise Activity 1: Pt performed standing BLE marches, heel raises, knee flexion, and mini squats x15 with no UE support as a progression. Standing hip abd x20 BLE with Unilateral UE support.    Therapeutic Activity  Therapeutic Activity Performed: Yes  Therapeutic Activity 1: Pt performed ambulation, stair training, step up on platform, and demonstration of log roll technique within the " session.    Bed Mobility  Bed Mobility: Yes  Bed Mobility 1  Bed Mobility 1:  (Re-education on log roll technique. Pt was educated on bending at the knees prior to starting roll and pt was compliant and demonstrated understanding.)  Level of Assistance 1: Close supervision    Ambulation/Gait Training  Ambulation/Gait Training Performed: Yes  Ambulation/Gait Training 1  Surface 1:  (Pt walked on level tiles from room to PT gym and back. Approx 290 ft. CGA/SBA without AD.)            Outcome Measures:  [facility] Basic Mobility  Turning from your back to your side while in a flat bed without using bedrails: A little  Moving from lying on your back to sitting on the side of a flat bed without using bedrails: A little  Moving to and from bed to chair (including a wheelchair): A little  Standing up from a chair using your arms (e.g. wheelchair or bedside chair): A little  To walk in hospital room: A little  Climbing 3-5 steps with railing: A little  Basic Mobility - Total Score: 18    Education Documentation  Precautions, taught by CECILIO Edge at 1/23/2025  9:10 AM.  Learner: Significant Other, Patient  Readiness: Acceptance  Method: Explanation, Demonstration  Response: Demonstrated Understanding, Verbalizes Understanding  Comment: Reviewed log roll - instructed in ther ex    Body Mechanics, taught by CECILIO Edge at 1/23/2025  9:10 AM.  Learner: Significant Other, Patient  Readiness: Acceptance  Method: Explanation, Demonstration  Response: Demonstrated Understanding, Verbalizes Understanding  Comment: Reviewed log roll - instructed in ther ex    Mobility Training, taught by CECILIO Edge at 1/23/2025  9:10 AM.  Learner: Significant Other, Patient  Readiness: Acceptance  Method: Explanation, Demonstration  Response: Demonstrated Understanding, Verbalizes Understanding  Comment: Reviewed log roll - instructed in ther ex    Education Comments  No comments found.        OP EDUCATION:       Encounter  Problems       Encounter Problems (Active)       Mobility       Pt will complete bed mobility independently via log roll technique (Progressing)       Start:  01/20/25    Expected End:  02/03/25            Pt will amb >250ft with RW and modified independence in prep for safe discharge home  (Progressing)       Start:  01/20/25    Expected End:  02/03/25            Pt will a/descend 12 steps with L rail in prep for safe home entry and to access bed/bathroom (Met)       Start:  01/20/25    Expected End:  02/03/25    Resolved:  01/21/25            PT Transfers       Pt will transfer sit to stand with RW and modified independence in prep for out of bed mobility  (Progressing)       Start:  01/20/25    Expected End:  02/03/25               Pain - Adult             Encounter Problems (Resolved)       Balance       Pt will maintain static/dynamic standing balance x4 minutes with RW and modified independence to demonstrate improved balance (Met)       Start:  01/20/25    Expected End:  02/03/25    Resolved:  01/22/25

## 2025-01-23 NOTE — CARE PLAN
Problem: Pain  Goal: Takes deep breaths with improved pain control throughout the shift  Outcome: Progressing  Goal: Turns in bed with improved pain control throughout the shift  Outcome: Progressing  Goal: Walks with improved pain control throughout the shift  Outcome: Progressing  Goal: Performs ADL's with improved pain control throughout shift  Outcome: Progressing  Goal: Participates in PT with improved pain control throughout the shift  Outcome: Progressing  Goal: Free from opioid side effects throughout the shift  Outcome: Progressing  Goal: Free from acute confusion related to pain meds throughout the shift  Outcome: Progressing     Problem: Pain - Adult  Goal: Verbalizes/displays adequate comfort level or baseline comfort level  Outcome: Progressing     Problem: Risk for falls  Goal: I will remain free from falls  Outcome: Progressing     Problem: Chronic Conditions and Co-morbidities  Goal: Patient's chronic conditions and co-morbidity symptoms are monitored and maintained or improved  Outcome: Progressing     Problem: Skin  Goal: Decreased wound size/increased tissue granulation at next dressing change  Outcome: Progressing  Goal: Participates in plan/prevention/treatment measures  Outcome: Progressing  Goal: Prevent/manage excess moisture  Outcome: Progressing  Goal: Prevent/minimize sheer/friction injuries  Outcome: Progressing  Goal: Promote/optimize nutrition  Outcome: Progressing  Goal: Promote skin healing  Outcome: Progressing   The patient's goals for the shift include  patient will remain safe throughout the shift    The clinical goals for the shift include The patient will remain safe throughout the shift

## 2025-01-23 NOTE — CARE PLAN
Met with pt and introduced myself as care coordinator with Care Transitions Team for discharge planning. Pt is agreeable to discharge to home with HC.  MD is aware of Pt discharge along with  the Nurse. Pt is leaving [facility] with . Pt reported no safety concerns at home.  Pt's address, phone number, and contact information was verified.  Discharge Planning: Pt is discharging to home with [facility]. SOC 24- 48 Hours  confirmed By Sandra sams  Clinical Manager

## 2025-01-23 NOTE — DISCHARGE SUMMARY
Discharge Diagnosis  Spinal stenosis, lumbar region with neurogenic claudication    Issues Requiring Follow-Up  Routine postoperative follow-up    Test Results Pending At Discharge  Pending Labs       No current pending labs.            Hospital Course  59 year-old female who presented with lumbar stenosis at L3-4 with associated radicular symptoms. Patient is now s/p removal of hardware at L4/5, L3/4 decompression and fusion from L3-5 on 1/20/2025 by Dr. Ayers. On the day of surgery, patient was identified in the pre-operative holding area and agreeable to proceed with surgery. Written consent was obtained.  Please see operative note for further details of this procedure. The surgery went without complications. Patient received 24 hours of jenna-operative antibiotics. Patient recovered in the PACU before transfer to a regular nursing floor. The patient was monitored as an inpatient postoperatively for drain output, therapy, and pain control. The drain was pulled when output was appropriately low. The patient had good pain control, was voiding, and was neurovascularly stable at the time of discharge. Patient will follow-up with Dr. Ayers in 3-4 weeks for post-operative visit.    Prescription for oxycodone 5mg q4-6 hr #42 provided due to exceedingly painful nature of surgical procedure. Patients undergoing these operations typically use 1-2 pills q4-6 hours for the first 1-2 weeks. The use will be monitored postoperatively by Dr. Ayers, and weaned appropriately during the recovery period.      Pertinent Physical Exam At Time of Discharge  Physical Exam  Surgical dressing clean, dry, and intact     L1: SILT       L2: SILT      Hip flexors 5/5 Left; 5/5 Right  L3: SILT      Knee extension 5/5 Left; 5/5 Right  L4: SILT      Tib Ant. (Dorsiflexion) 5/5 Left; 5/5 Right  L5: SILT      EHL 5/5 Left; 5/5 Right  S1: SILT      Plantarflexion 5/5 Left; 5/5 Right    Home Medications     Medication List      START taking these  medications     acetaminophen 500 mg tablet; Commonly known as: Tylenol Extra Strength;   Take 2 tablets (1,000 mg) by mouth every 8 hours.; Replaces: acetaminophen   650 mg ER tablet   methocarbamol 500 mg tablet; Commonly known as: Robaxin; Take 1-2   tablets by mouth every 8 hours as needed for spasms   oxyCODONE 5 mg immediate release tablet; Commonly known as: Roxicodone;   Take 1 tablet (5 mg) by mouth every 4 hours if needed for severe pain (7 -   10).   polyethylene glycol 17 gram/dose powder; Commonly known as: Miralax; Mix   1 capful (17 g) of powder in 4-8 ounces of liquid and drink 2 times a day   as needed for constipation.     CONTINUE taking these medications     aspirin 81 mg EC tablet   elderberry fruit 350 mg capsule   ergocalciferol 1.25 MG (02310 UT) capsule; Commonly known as: Vitamin   D-2   montelukast 5 mg chewable tablet; Commonly known as: Singulair   oxybutynin XL 5 mg 24 hr tablet; Commonly known as: Ditropan-XL   pantoprazole 40 mg EC tablet; Commonly known as: ProtoNix   rosuvastatin 5 mg tablet; Commonly known as: Crestor   sucralfate 1 gram tablet; Commonly known as: Carafate     STOP taking these medications     acetaminophen 650 mg ER tablet; Commonly known as: Tylenol 8 HOUR;   Replaced by: acetaminophen 500 mg tablet   chlorhexidine 0.12 % solution; Commonly known as: Peridex   chlorhexidine 4 % external liquid; Commonly known as: Hibiclens   meloxicam 15 mg tablet; Commonly known as: Mobic       Outpatient Follow-Up  Future Appointments   Date Time Provider Department [place]   2/5/2025 10:00 AM Javed Ayers MD [id number] [facility]   2/26/2025 10:20 AM Javed Ayers MD [id number] [facility]   4/3/2025 11:00 AM Mary Ann Gary MD [id number] [facility]       Jesus Montalvo MD

## 2025-01-23 NOTE — PROGRESS NOTES
"Orthopaedic Surgery Progress Note    Subjective: Evaluated on regular nursing floor. Pain very well controlled. Denies chest pain, shortness of breath, or fevers. Having regular bowel movements and urinating well.    Continues to ambulate well with PT.    Objective:  /62 (BP Location: Left arm, Patient Position: Lying)   Pulse 69   Temp 36.4 °C (97.5 °F) (Temporal)   Resp 16   Ht 1.651 m (5' 5\")   Wt 85.8 kg (189 lb 2.5 oz)   SpO2 96%   BMI 31.48 kg/m²     Gen: arousable, NAD, appropriately conversational  Cardiac: RRR to peripheral palpation  Resp: nonlabored on RA  GI: soft, nondistended    MSK:  Spine Exam:  Drain in place holding suction, SS output  Surgical dressing clean, dry, and intact    L1: SILT       L2: SILT      Hip flexors 5/5 Left; 5/5 Right  L3: SILT      Knee extension 5/5 Left; 5/5 Right  L4: SILT      Tib Ant. (Dorsiflexion) 5/5 Left; 5/5 Right  L5: SILT      EHL 5/5 Left; 5/5 Right  S1: SILT      Plantarflexion 5/5 Left; 5/5 Right    Assessment/Plan: 59 y.o. female PMHx prior L4-5 fusion s/p hardware exchange at L4/L5, L3/4 decompression and fusion on 1/20/2025 with Dr. Ayers.      Plan:  Plan:  - Weight-bearing status: Activity as tolerated; no excessive twisting, turning, or bending    - Feeding: Regular diet as tolerated, bowel regimen  - Analgesia: Tylenol 650mg, oxy 5mg/10mg, Dilaudid 0.4mg, Robaxin prn, dexamethasone 10 mg IV q8h for 3 doses   - Volume: mIV @ at  cc/hr, HLIVF when tolerating PO, monitor BMP  - OT/PT: Consulted, recommended HHC  - Respiratory: Encourage IS, maintain O2 sat >92%  - Infection: Sivan-operative Ancef for 48 hours, afebrile   - Lines: Maintain PIVx2 while inpatient  - Drains: Maintain HV drain, monitor and record output q8 hrs; anticipate removal today pending discussion with staff  - Gunn: None   - Embolic ppx: SCDs, early mobility    - Transfusion: no indication for transfusion  - Cardiac: No issues  - Glycemic: no glycemic issues  - C/w home " medications      Dispo C pending drain removal (anticipate today)    Plan was discussed with attending Dr. Armen Montalvo MD  Orthopaedic Surgery PGY-2  [facility]  Pager: [phone]  Available by Epic Chat    While admitted, this patient will be followed by the Ortho Spine Team. Please contact below residents with any questions (available via Epic Chat).     First call: Jesus Montalvo, PGY-2   Second call: Mayo Ayoub, PGY-4  Third call: Marcos Lees, Fellow

## 2025-02-05 ENCOUNTER — APPOINTMENT (OUTPATIENT)
Dept: ORTHOPEDIC SURGERY | Facility: CLINIC | Age: 60
End: 2025-02-05
Payer: COMMERCIAL

## 2025-02-26 ENCOUNTER — APPOINTMENT (OUTPATIENT)
Dept: ORTHOPEDIC SURGERY | Facility: CLINIC | Age: 60
End: 2025-02-26
Payer: COMMERCIAL

## 2025-02-26 ENCOUNTER — HOSPITAL ENCOUNTER (OUTPATIENT)
Dept: RADIOLOGY | Facility: CLINIC | Age: 60
Discharge: HOME | End: 2025-02-26
Payer: COMMERCIAL

## 2025-02-26 DIAGNOSIS — M48.061 DEGENERATIVE LUMBAR SPINAL STENOSIS: ICD-10-CM

## 2025-02-26 PROCEDURE — 72100 X-RAY EXAM L-S SPINE 2/3 VWS: CPT

## 2025-02-26 PROCEDURE — 99024 POSTOP FOLLOW-UP VISIT: CPT | Performed by: ORTHOPAEDIC SURGERY

## 2025-02-26 ASSESSMENT — PAIN - FUNCTIONAL ASSESSMENT: PAIN_FUNCTIONAL_ASSESSMENT: NO/DENIES PAIN

## 2025-02-26 NOTE — PROGRESS NOTES
Yesi is 6 weeks from surgery and she has done well.  She has had excellent relief of her preoperative leg symptoms.  She does have some ongoing left leg numbness.    She looks terrific.  Posture is upright.  Incision healed.  There was 1 very small nylon subcutaneous stitch.  I did not take this out.    Her strength is intact.    X-rays show good position of her instrumentation.    Stable course.  She will continue a home exercise program.  She is off of work until early in April.  Will see her back shortly before this.  I suspect that she will need permanent lifting restrictions at work, to include not lifting greater than 15 pounds.

## 2025-02-26 NOTE — LETTER
February 26, 2025     Yon Pryor DO  [address] [place] [address]    Patient: Ioana Mckeon   YOB: 1965   Date of Visit: 2/26/2025       Dear Dr. Yon Pryor, :    Thank you for referring Ioana Mckeon to me for evaluation. Below are my notes for this consultation.  If you have questions, please do not hesitate to call me. I look forward to following your patient along with you.       Sincerely,     Javed Ayers MD      CC: No Recipients  ______________________________________________________________________________________    Yesi is 6 weeks from surgery and she has done well.  She has had excellent relief of her preoperative leg symptoms.  She does have some ongoing left leg numbness.    She looks terrific.  Posture is upright.  Incision healed.  There was 1 very small nylon subcutaneous stitch.  I did not take this out.    Her strength is intact.    X-rays show good position of her instrumentation.    Stable course.  She will continue a home exercise program.  She is off of work until early in April.  Will see her back shortly before this.  I suspect that she will need permanent lifting restrictions at work, to include not lifting greater than 15 pounds.

## 2025-03-01 LAB
25(OH)D3 SERPL-MCNC: 20.2 NG/ML (ref 30–100)
ALBUMIN SERPL-MCNC: 4.4 G/DL (ref 3.5–5.2)
ALP SERPL-CCNC: 110 U/L (ref 35–104)
ALT SERPL-CCNC: 8 U/L (ref 0–32)
ANION GAP SERPL CALCULATED.3IONS-SCNC: 19 MMOL/L (ref 7–16)
AST SERPL-CCNC: 22 U/L (ref 0–31)
BILIRUB SERPL-MCNC: 0.3 MG/DL (ref 0–1.2)
BUN SERPL-MCNC: 13 MG/DL (ref 6–23)
CALCIUM SERPL-MCNC: 9.9 MG/DL (ref 8.6–10.2)
CHLORIDE SERPL-SCNC: 104 MMOL/L (ref 98–107)
CHOLEST SERPL-MCNC: 229 MG/DL
CO2 SERPL-SCNC: 21 MMOL/L (ref 22–29)
CREAT SERPL-MCNC: 1.1 MG/DL (ref 0.5–1)
ERYTHROCYTE [DISTWIDTH] IN BLOOD BY AUTOMATED COUNT: 13.1 % (ref 11.5–15)
GFR, ESTIMATED: 56 ML/MIN/1.73M2
GLUCOSE SERPL-MCNC: 97 MG/DL (ref 74–99)
HCT VFR BLD AUTO: 37.2 % (ref 34–48)
HDLC SERPL-MCNC: 57 MG/DL
HGB BLD-MCNC: 11.7 G/DL (ref 11.5–15.5)
LDLC SERPL CALC-MCNC: 154 MG/DL
MCH RBC QN AUTO: 28.8 PG (ref 26–35)
MCHC RBC AUTO-ENTMCNC: 31.5 G/DL (ref 32–34.5)
MCV RBC AUTO: 91.6 FL (ref 80–99.9)
PLATELET # BLD AUTO: 243 K/UL (ref 130–450)
PMV BLD AUTO: 12 FL (ref 7–12)
POTASSIUM SERPL-SCNC: 5 MMOL/L (ref 3.5–5)
PROT SERPL-MCNC: 8 G/DL (ref 6.4–8.3)
RBC # BLD AUTO: 4.06 M/UL (ref 3.5–5.5)
SODIUM SERPL-SCNC: 144 MMOL/L (ref 132–146)
TRIGL SERPL-MCNC: 92 MG/DL
TSH SERPL DL<=0.05 MIU/L-ACNC: 1.02 UIU/ML (ref 0.27–4.2)
VLDLC SERPL CALC-MCNC: 18 MG/DL
WBC OTHER # BLD: 2.8 K/UL (ref 4.5–11.5)

## 2025-03-26 ENCOUNTER — OFFICE VISIT (OUTPATIENT)
Dept: HEMATOLOGY/ONCOLOGY | Facility: CLINIC | Age: 60
End: 2025-03-26
Payer: COMMERCIAL

## 2025-03-26 VITALS
BODY MASS INDEX: 32.11 KG/M2 | HEART RATE: 98 BPM | SYSTOLIC BLOOD PRESSURE: 135 MMHG | OXYGEN SATURATION: 97 % | TEMPERATURE: 97.2 F | HEIGHT: 64 IN | DIASTOLIC BLOOD PRESSURE: 84 MMHG | WEIGHT: 188.05 LBS | RESPIRATION RATE: 17 BRPM

## 2025-03-26 DIAGNOSIS — D72.819 LEUKOPENIA, UNSPECIFIED TYPE: Primary | ICD-10-CM

## 2025-03-26 PROCEDURE — 83516 IMMUNOASSAY NONANTIBODY: CPT | Mod: GEALAB | Performed by: INTERNAL MEDICINE

## 2025-03-26 PROCEDURE — 86038 ANTINUCLEAR ANTIBODIES: CPT | Mod: GEALAB | Performed by: INTERNAL MEDICINE

## 2025-03-26 PROCEDURE — 84165 PROTEIN E-PHORESIS SERUM: CPT | Mod: GEALAB | Performed by: INTERNAL MEDICINE

## 2025-03-26 PROCEDURE — 84155 ASSAY OF PROTEIN SERUM: CPT | Mod: GEALAB | Performed by: INTERNAL MEDICINE

## 2025-03-26 PROCEDURE — 36415 COLL VENOUS BLD VENIPUNCTURE: CPT | Performed by: INTERNAL MEDICINE

## 2025-03-26 PROCEDURE — 86235 NUCLEAR ANTIGEN ANTIBODY: CPT | Performed by: INTERNAL MEDICINE

## 2025-03-26 RX ORDER — CIPROFLOXACIN 500 MG/1
500 TABLET ORAL 2 TIMES DAILY
COMMUNITY

## 2025-03-26 ASSESSMENT — PAIN SCALES - GENERAL: PAINLEVEL_OUTOF10: 3

## 2025-03-26 ASSESSMENT — ENCOUNTER SYMPTOMS
LOSS OF SENSATION IN FEET: 1
DEPRESSION: 0
OCCASIONAL FEELINGS OF UNSTEADINESS: 0

## 2025-03-26 ASSESSMENT — PATIENT HEALTH QUESTIONNAIRE - PHQ9
1. LITTLE INTEREST OR PLEASURE IN DOING THINGS: NOT AT ALL
SUM OF ALL RESPONSES TO PHQ9 QUESTIONS 1 AND 2: 0
2. FEELING DOWN, DEPRESSED OR HOPELESS: NOT AT ALL

## 2025-03-26 NOTE — PATIENT INSTRUCTIONS
Today you met with your hematologist/oncologist.  Recent labs were discussed and questions answered.  Scheduling orders were placed.  While we appreciate that you verbalized understanding, if any questions arise after leaving, please do not hesitate to call the office to discuss.  [phone] Jigna Whitfield.  Dr Villatoro has ordered some labs and bone marrow biopsy. Information on this procedure was sent to your MyChart. Please take a moment to review. Dr Villatoro will see you in about 5 weeks to review the results of the labs and your biopsy. Please review the new patient information provided to you at today's appointment.

## 2025-03-26 NOTE — H&P (VIEW-ONLY)
"Patient ID: Ioana Mckeon is a 59 y.o. female.  Referring Physician: No referring provider defined for this encounter.  Primary Care Provider: Yon Pryor DO  Visit Type:  Initial Visit     Subjective    HPI    Referred for evaluation for Leukopenia.    IgGAM normal   Review of Systems   Constitutional: Negative.    HENT:  Negative.     Respiratory: Negative.     Cardiovascular: Negative.    Gastrointestinal: Negative.       Flow Cytometry Report: 8/25/2024: Comment: There is a relative increase in naive B cells with a relative decrease in    non-switched memory/marginal zone B cells and switched memory B cells.  Plasmablasts were not enumerated due to ageing of sample ex vivo.   A similar pattern may be seen in common variable immunodeficiency.   Objective   BSA: 1.96 meters squared  /84 (BP Location: Left arm, Patient Position: Sitting, BP Cuff Size: Adult)   Pulse 98   Temp 36.2 °C (97.2 °F) (Temporal)   Resp 17   Ht 1.629 m (5' 4.13\")   Wt 85.3 kg (188 lb 0.8 oz)   SpO2 97%   BMI 32.14 kg/m²      has a past medical history of A-fib (Multi), Arthritis, Asthma, Chronic pain disorder, GERD (gastroesophageal reflux disease), Hiatal hernia, Hyperlipidemia, Joint pain, Nephrolithiasis, Osteoarthritis, Personal history of other diseases of the respiratory system, Personal history of other endocrine, nutritional and metabolic disease, PONV (postoperative nausea and vomiting), Spinal stenosis, and Varicose veins of left lower extremity with pain.   has a past surgical history that includes Cholecystectomy; Other surgical history; Hysterectomy; Knee Arthroplasty (Bilateral); and XR humerus (Right).  No family history on file.  Oncology History    No history exists.       Ioana Mckeon  reports that she has never smoked. She has never used smokeless tobacco.  She  reports current alcohol use.  She  reports no history of drug use.    Physical Exam  Constitutional:       Appearance: Normal " appearance.   HENT:      Head: Normocephalic and atraumatic.   Eyes:      Extraocular Movements: Extraocular movements intact.      Pupils: Pupils are equal, round, and reactive to light.   Neurological:      Mental Status: She is alert.         WBC   Date/Time Value Ref Range Status   01/13/2025 09:18 AM 2.5 (L) 4.4 - 11.3 x10*3/uL Final   08/25/2023 10:04 AM 3.1 (L) 4.4 - 11.3 x10E9/L Final   08/25/2023 10:04 AM CANCELED       Comment:     Result canceled by the ancillary.   08/25/2023 10:04 AM CANCELED       Comment:     Result canceled by the ancillary.     nRBC   Date Value Ref Range Status   01/13/2025 0.0 0.0 - 0.0 /100 WBCs Final   08/25/2023 CANCELED       Comment:     Result canceled by the ancillary.   08/25/2023 CANCELED       Comment:     Result canceled by the ancillary.   05/29/2021 0.0 0.0 - 0.0 /100 WBC Final     RBC   Date Value Ref Range Status   01/13/2025 3.98 (L) 4.00 - 5.20 x10*6/uL Final   08/25/2023 4.09 4.00 - 5.20 x10E12/L Final   08/25/2023 CANCELED       Comment:     Result canceled by the ancillary.   08/25/2023 CANCELED       Comment:     Result canceled by the ancillary.     Hemoglobin   Date Value Ref Range Status   01/13/2025 12.0 12.0 - 16.0 g/dL Final   08/25/2023 12.3 12.0 - 16.0 g/dL Final   08/25/2023 CANCELED       Comment:     Result canceled by the ancillary.   08/25/2023 CANCELED       Comment:     Result canceled by the ancillary.     Hematocrit   Date Value Ref Range Status   01/13/2025 36.9 36.0 - 46.0 % Final   08/25/2023 37.5 36.0 - 46.0 % Final   08/25/2023 CANCELED       Comment:     Result canceled by the ancillary.   08/25/2023 CANCELED       Comment:     Result canceled by the ancillary.     MCV   Date/Time Value Ref Range Status   01/13/2025 09:18 AM 93 80 - 100 fL Final   08/25/2023 10:04 AM 92 80 - 100 fL Final   08/25/2023 10:04 AM CANCELED       Comment:     Result canceled by the ancillary.   08/25/2023 10:04 AM CANCELED       Comment:     Result canceled by  "the ancillary.     MCH   Date/Time Value Ref Range Status   01/13/2025 09:18 AM 30.2 26.0 - 34.0 pg Final     MCHC   Date/Time Value Ref Range Status   01/13/2025 09:18 AM 32.5 32.0 - 36.0 g/dL Final   08/25/2023 10:04 AM 32.8 32.0 - 36.0 g/dL Final   08/25/2023 10:04 AM CANCELED       Comment:     Result canceled by the ancillary.   08/25/2023 10:04 AM CANCELED       Comment:     Result canceled by the ancillary.     RDW   Date/Time Value Ref Range Status   01/13/2025 09:18 AM 13.1 11.5 - 14.5 % Final   08/25/2023 10:04 AM 13.7 11.5 - 14.5 % Final   08/25/2023 10:04 AM CANCELED       Comment:     Result canceled by the ancillary.   08/25/2023 10:04 AM CANCELED       Comment:     Result canceled by the ancillary.     Platelets   Date/Time Value Ref Range Status   01/13/2025 09:18  150 - 450 x10*3/uL Final   08/25/2023 10:04  150 - 450 x10E9/L Final   08/25/2023 10:04 AM CANCELED       Comment:     Result canceled by the ancillary.   08/25/2023 10:04 AM CANCELED       Comment:     Result canceled by the ancillary.     No results found for: \"MPV\"  Neutrophils %   Date/Time Value Ref Range Status   08/25/2023 10:04 AM 49.9 40.0 - 80.0 % Final   08/25/2023 10:04 AM CANCELED       Comment:     Result canceled by the ancillary.   08/25/2023 10:04 AM CANCELED       Comment:     Result canceled by the ancillary.     Immature Granulocytes %, Automated   Date/Time Value Ref Range Status   08/25/2023 10:04 AM 0.3 0.0 - 0.9 % Final     Comment:      Immature Granulocyte Count (IG) includes promyelocytes,    myelocytes and metamyelocytes but does not include bands.   Percent differential counts (%) should be interpreted in the   context of the absolute cell counts (cells/L).     08/25/2023 10:04 AM CANCELED       Comment:      Immature Granulocyte Count (IG) includes promyelocytes,    myelocytes and metamyelocytes but does not include bands.   Percent differential counts (%) should be interpreted in the   context of " "the absolute cell counts (cells/L).    Result canceled by the ancillary.     08/25/2023 10:04 AM CANCELED       Comment:      Immature Granulocyte Count (IG) includes promyelocytes,    myelocytes and metamyelocytes but does not include bands.   Percent differential counts (%) should be interpreted in the   context of the absolute cell counts (cells/L).    Result canceled by the ancillary.       Lymphocytes %   Date/Time Value Ref Range Status   08/25/2023 10:04 AM 35.9 13.0 - 44.0 % Final   08/25/2023 10:04 AM CANCELED       Comment:     Result canceled by the ancillary.   08/25/2023 10:04 AM CANCELED       Comment:     Result canceled by the ancillary.     Monocytes %   Date/Time Value Ref Range Status   08/25/2023 10:04 AM 11.0 2.0 - 10.0 % Final   08/25/2023 10:04 AM CANCELED       Comment:     Result canceled by the ancillary.   08/25/2023 10:04 AM CANCELED       Comment:     Result canceled by the ancillary.     Eosinophils %   Date/Time Value Ref Range Status   08/25/2023 10:04 AM 2.3 0.0 - 6.0 % Final   08/25/2023 10:04 AM CANCELED       Comment:     Result canceled by the ancillary.   08/25/2023 10:04 AM CANCELED       Comment:     Result canceled by the ancillary.     Basophils %   Date/Time Value Ref Range Status   08/25/2023 10:04 AM 0.6 0.0 - 2.0 % Final   08/25/2023 10:04 AM CANCELED       Comment:     Result canceled by the ancillary.   08/25/2023 10:04 AM CANCELED       Comment:     Result canceled by the ancillary.     Neutrophils Absolute   Date/Time Value Ref Range Status   08/25/2023 10:04 AM 1.54 1.20 - 7.70 x10E9/L Final   08/25/2023 10:04 AM CANCELED       Comment:     Result canceled by the ancillary.   08/25/2023 10:04 AM CANCELED       Comment:     Result canceled by the ancillary.     No results found for: \"IGABSOL\"  Lymphocytes Absolute   Date/Time Value Ref Range Status   08/25/2023 10:04 AM 1.11 (L) 1.20 - 4.80 x10E9/L Final   08/25/2023 10:04 AM CANCELED       Comment:     Result " "canceled by the ancillary.   08/25/2023 10:04 AM CANCELED       Comment:     Result canceled by the ancillary.     Monocytes Absolute   Date/Time Value Ref Range Status   08/25/2023 10:04 AM 0.34 0.10 - 1.00 x10E9/L Final   08/25/2023 10:04 AM CANCELED       Comment:     Result canceled by the ancillary.   08/25/2023 10:04 AM CANCELED       Comment:     Result canceled by the ancillary.     Eosinophils Absolute   Date/Time Value Ref Range Status   08/25/2023 10:04 AM 0.07 0.00 - 0.70 x10E9/L Final   08/25/2023 10:04 AM CANCELED       Comment:     Result canceled by the ancillary.   08/25/2023 10:04 AM CANCELED       Comment:     Result canceled by the ancillary.     Basophils Absolute   Date/Time Value Ref Range Status   08/25/2023 10:04 AM 0.02 0.00 - 0.10 x10E9/L Final   08/25/2023 10:04 AM CANCELED       Comment:     Result canceled by the ancillary.   08/25/2023 10:04 AM CANCELED       Comment:     Result canceled by the ancillary.       No components found for: \"PT\"  aPTT   Date/Time Value Ref Range Status   01/13/2025 09:18 AM 30 27 - 38 seconds Final       Assessment/Plan      Patient referred with an isolated leukopenia and lymphocytopenia neutrophils appear normal.  TINA SPEP ordered.  Patient noted to also have a low percentage saturation of iron and serum ferritin has been ordered again.    Will defer a bone marrow biopsy at this time.  Patient's symptoms appear to have began as per the record in the computer in 2021.  Admits extensive arthritis and arthropathy reminiscent of a rheumatologic abnormality.  Her leukopenia could be autoimmune related.     Diagnoses and all orders for this visit:  Leukopenia, unspecified type  -     Serum Protein Electrophoresis; Future  -     TINA with Reflex to NAOMI; Future  -     Celiac Panel; Future  -     Bone Marrow Evaluation; Future  -     Clinic Appointment Request Follow Up (review bmbx); Future  -     CT bone marrow biopsy and aspirations only; Future  -     NAOMI " Panel           Robin-Bran Villatoro MD

## 2025-03-26 NOTE — PROGRESS NOTES
"Patient ID: Ioana Mckeon is a 59 y.o. female.  Referring Physician: No referring provider defined for this encounter.  Primary Care Provider: Yon Pryor DO  Visit Type:  {Visit Type:89996}     {Telehealth Consent:20676}    Subjective    HPI    Referred for evaluation for Leukopenia.    IgGAM normal   Review of Systems - Oncology   Flow Cytometry Report: 8/25/2024: Comment: There is a relative increase in naive B cells with a relative decrease in    non-switched memory/marginal zone B cells and switched memory B cells.  Plasmablasts were not enumerated due to ageing of sample ex vivo.   A similar pattern may be seen in common variable immunodeficiency.   Objective   BSA: 1.96 meters squared  /84 (BP Location: Left arm, Patient Position: Sitting, BP Cuff Size: Adult)   Pulse 98   Temp 36.2 °C (97.2 °F) (Temporal)   Resp 17   Ht 1.629 m (5' 4.13\")   Wt 85.3 kg (188 lb 0.8 oz)   SpO2 97%   BMI 32.14 kg/m²      has a past medical history of A-fib (Multi), Arthritis, Asthma, Chronic pain disorder, GERD (gastroesophageal reflux disease), Hiatal hernia, Hyperlipidemia, Joint pain, Nephrolithiasis, Osteoarthritis, Personal history of other diseases of the respiratory system, Personal history of other endocrine, nutritional and metabolic disease, PONV (postoperative nausea and vomiting), Spinal stenosis, and Varicose veins of left lower extremity with pain.   has a past surgical history that includes Cholecystectomy; Other surgical history; Hysterectomy; Knee Arthroplasty (Bilateral); and XR humerus (Right).  No family history on file.  Oncology History    No history exists.       Ioana Mckeon  reports that she has never smoked. She has never used smokeless tobacco.  She  reports current alcohol use.  She  reports no history of drug use.    Physical Exam    WBC   Date/Time Value Ref Range Status   01/13/2025 09:18 AM 2.5 (L) 4.4 - 11.3 x10*3/uL Final   08/25/2023 10:04 AM 3.1 (L) 4.4 - 11.3 " x10E9/L Final   08/25/2023 10:04 AM CANCELED       Comment:     Result canceled by the ancillary.   08/25/2023 10:04 AM CANCELED       Comment:     Result canceled by the ancillary.     nRBC   Date Value Ref Range Status   01/13/2025 0.0 0.0 - 0.0 /100 WBCs Final   08/25/2023 CANCELED       Comment:     Result canceled by the ancillary.   08/25/2023 CANCELED       Comment:     Result canceled by the ancillary.   05/29/2021 0.0 0.0 - 0.0 /100 WBC Final     RBC   Date Value Ref Range Status   01/13/2025 3.98 (L) 4.00 - 5.20 x10*6/uL Final   08/25/2023 4.09 4.00 - 5.20 x10E12/L Final   08/25/2023 CANCELED       Comment:     Result canceled by the ancillary.   08/25/2023 CANCELED       Comment:     Result canceled by the ancillary.     Hemoglobin   Date Value Ref Range Status   01/13/2025 12.0 12.0 - 16.0 g/dL Final   08/25/2023 12.3 12.0 - 16.0 g/dL Final   08/25/2023 CANCELED       Comment:     Result canceled by the ancillary.   08/25/2023 CANCELED       Comment:     Result canceled by the ancillary.     Hematocrit   Date Value Ref Range Status   01/13/2025 36.9 36.0 - 46.0 % Final   08/25/2023 37.5 36.0 - 46.0 % Final   08/25/2023 CANCELED       Comment:     Result canceled by the ancillary.   08/25/2023 CANCELED       Comment:     Result canceled by the ancillary.     MCV   Date/Time Value Ref Range Status   01/13/2025 09:18 AM 93 80 - 100 fL Final   08/25/2023 10:04 AM 92 80 - 100 fL Final   08/25/2023 10:04 AM CANCELED       Comment:     Result canceled by the ancillary.   08/25/2023 10:04 AM CANCELED       Comment:     Result canceled by the ancillary.     MCH   Date/Time Value Ref Range Status   01/13/2025 09:18 AM 30.2 26.0 - 34.0 pg Final     [facility]   Date/Time Value Ref Range Status   01/13/2025 09:18 AM 32.5 32.0 - 36.0 g/dL Final   08/25/2023 10:04 AM 32.8 32.0 - 36.0 g/dL Final   08/25/2023 10:04 AM CANCELED       Comment:     Result canceled by the ancillary.   08/25/2023 10:04 AM CANCELED       Comment:  "    Result canceled by the ancillary.     RDW   Date/Time Value Ref Range Status   01/13/2025 09:18 AM 13.1 11.5 - 14.5 % Final   08/25/2023 10:04 AM 13.7 11.5 - 14.5 % Final   08/25/2023 10:04 AM CANCELED       Comment:     Result canceled by the ancillary.   08/25/2023 10:04 AM CANCELED       Comment:     Result canceled by the ancillary.     Platelets   Date/Time Value Ref Range Status   01/13/2025 09:18  150 - 450 x10*3/uL Final   08/25/2023 10:04  150 - 450 x10E9/L Final   08/25/2023 10:04 AM CANCELED       Comment:     Result canceled by the ancillary.   08/25/2023 10:04 AM CANCELED       Comment:     Result canceled by the ancillary.     No results found for: \"MPV\"  Neutrophils %   Date/Time Value Ref Range Status   08/25/2023 10:04 AM 49.9 40.0 - 80.0 % Final   08/25/2023 10:04 AM CANCELED       Comment:     Result canceled by the ancillary.   08/25/2023 10:04 AM CANCELED       Comment:     Result canceled by the ancillary.     Immature Granulocytes %, Automated   Date/Time Value Ref Range Status   08/25/2023 10:04 AM 0.3 0.0 - 0.9 % Final     Comment:      Immature Granulocyte Count (IG) includes promyelocytes,    myelocytes and metamyelocytes but does not include bands.   Percent differential counts (%) should be interpreted in the   context of the absolute cell counts (cells/L).     08/25/2023 10:04 AM CANCELED       Comment:      Immature Granulocyte Count (IG) includes promyelocytes,    myelocytes and metamyelocytes but does not include bands.   Percent differential counts (%) should be interpreted in the   context of the absolute cell counts (cells/L).    Result canceled by the ancillary.     08/25/2023 10:04 AM CANCELED       Comment:      Immature Granulocyte Count (IG) includes promyelocytes,    myelocytes and metamyelocytes but does not include bands.   Percent differential counts (%) should be interpreted in the   context of the absolute cell counts (cells/L).    Result canceled by the " "ancillary.       Lymphocytes %   Date/Time Value Ref Range Status   08/25/2023 10:04 AM 35.9 13.0 - 44.0 % Final   08/25/2023 10:04 AM CANCELED       Comment:     Result canceled by the ancillary.   08/25/2023 10:04 AM CANCELED       Comment:     Result canceled by the ancillary.     Monocytes %   Date/Time Value Ref Range Status   08/25/2023 10:04 AM 11.0 2.0 - 10.0 % Final   08/25/2023 10:04 AM CANCELED       Comment:     Result canceled by the ancillary.   08/25/2023 10:04 AM CANCELED       Comment:     Result canceled by the ancillary.     Eosinophils %   Date/Time Value Ref Range Status   08/25/2023 10:04 AM 2.3 0.0 - 6.0 % Final   08/25/2023 10:04 AM CANCELED       Comment:     Result canceled by the ancillary.   08/25/2023 10:04 AM CANCELED       Comment:     Result canceled by the ancillary.     Basophils %   Date/Time Value Ref Range Status   08/25/2023 10:04 AM 0.6 0.0 - 2.0 % Final   08/25/2023 10:04 AM CANCELED       Comment:     Result canceled by the ancillary.   08/25/2023 10:04 AM CANCELED       Comment:     Result canceled by the ancillary.     Neutrophils Absolute   Date/Time Value Ref Range Status   08/25/2023 10:04 AM 1.54 1.20 - 7.70 x10E9/L Final   08/25/2023 10:04 AM CANCELED       Comment:     Result canceled by the ancillary.   08/25/2023 10:04 AM CANCELED       Comment:     Result canceled by the ancillary.     No results found for: \"IGABSOL\"  Lymphocytes Absolute   Date/Time Value Ref Range Status   08/25/2023 10:04 AM 1.11 (L) 1.20 - 4.80 x10E9/L Final   08/25/2023 10:04 AM CANCELED       Comment:     Result canceled by the ancillary.   08/25/2023 10:04 AM CANCELED       Comment:     Result canceled by the ancillary.     Monocytes Absolute   Date/Time Value Ref Range Status   08/25/2023 10:04 AM 0.34 0.10 - 1.00 x10E9/L Final   08/25/2023 10:04 AM CANCELED       Comment:     Result canceled by the ancillary.   08/25/2023 10:04 AM CANCELED       Comment:     Result canceled by the " "ancillary.     Eosinophils Absolute   Date/Time Value Ref Range Status   08/25/2023 10:04 AM 0.07 0.00 - 0.70 x10E9/L Final   08/25/2023 10:04 AM CANCELED       Comment:     Result canceled by the ancillary.   08/25/2023 10:04 AM CANCELED       Comment:     Result canceled by the ancillary.     Basophils Absolute   Date/Time Value Ref Range Status   08/25/2023 10:04 AM 0.02 0.00 - 0.10 x10E9/L Final   08/25/2023 10:04 AM CANCELED       Comment:     Result canceled by the ancillary.   08/25/2023 10:04 AM CANCELED       Comment:     Result canceled by the ancillary.       No components found for: \"PT\"  aPTT   Date/Time Value Ref Range Status   01/13/2025 09:18 AM 30 27 - 38 seconds Final       Assessment/Plan      Patient referred with an isolated leukopenia and lymphocytopenia neutrophils appear normal.  TINA SPEP ordered.  Patient noted to also have a low percentage saturation of iron and serum ferritin has been ordered again.    Will defer a bone marrow biopsy at this time.  Patient's symptoms appear to have began as per the record in the computer in 2021.  Admits extensive arthritis and arthropathy reminiscent of a rheumatologic abnormality.  Her leukopenia could be autoimmune related.     {Assess/PlanSmartLinks:31390}         Jayro Villatoro MD                         " Panel           Robin-Bran Villatoro MD

## 2025-03-27 ENCOUNTER — HOSPITAL ENCOUNTER (OUTPATIENT)
Dept: RADIOLOGY | Facility: CLINIC | Age: 60
Discharge: HOME | End: 2025-03-27
Payer: COMMERCIAL

## 2025-03-27 DIAGNOSIS — Z12.31 SCREENING MAMMOGRAM FOR BREAST CANCER: ICD-10-CM

## 2025-03-27 LAB
GLIADIN PEPTIDE IGA SER IA-ACNC: <1 U/ML
PROT SERPL-MCNC: 7.9 G/DL (ref 6.4–8.2)
TTG IGA SER IA-ACNC: <1 U/ML

## 2025-03-27 PROCEDURE — 77063 BREAST TOMOSYNTHESIS BI: CPT

## 2025-03-28 LAB
ANA PATTERN: ABNORMAL
ANA SER QL HEP2 SUBST: POSITIVE
ANA TITR SER IF: ABNORMAL {TITER}
CENTROMERE B AB SER-ACNC: <0.2 AI
CHROMATIN AB SERPL-ACNC: <0.2 AI
DSDNA AB SER-ACNC: 1 IU/ML
ENA JO1 AB SER QL IA: <0.2 AI
ENA RNP AB SER IA-ACNC: 0.6 AI
ENA SCL70 AB SER QL IA: <0.2 AI
ENA SM AB SER IA-ACNC: <0.2 AI
ENA SM+RNP AB SER QL IA: <0.2 AI
ENA SS-A AB SER IA-ACNC: 0.4 AI
ENA SS-B AB SER IA-ACNC: <0.2 AI
GLIADIN PEPTIDE IGG SER IA-ACNC: 1.48 FLU (ref 0–4.99)
RIBOSOMAL P AB SER-ACNC: <0.2 AI
TTG IGG SER IA-ACNC: <0.82 FLU (ref 0–4.99)

## 2025-03-29 LAB
ALBUMIN: 4.6 G/DL (ref 3.4–5)
ALPHA 1 GLOBULIN: 0.4 G/DL (ref 0.2–0.6)
ALPHA 2 GLOBULIN: 0.8 G/DL (ref 0.4–1.1)
BETA GLOBULIN: 1 G/DL (ref 0.5–1.2)
GAMMA GLOBULIN: 1.2 G/DL (ref 0.5–1.4)
PATH REVIEW-SERUM PROTEIN ELECTROPHORESIS: NORMAL
PROTEIN ELECTROPHORESIS COMMENT: NORMAL

## 2025-04-01 ASSESSMENT — ENCOUNTER SYMPTOMS
CONSTITUTIONAL NEGATIVE: 1
RESPIRATORY NEGATIVE: 1
CARDIOVASCULAR NEGATIVE: 1
GASTROINTESTINAL NEGATIVE: 1

## 2025-04-02 PROBLEM — M17.11 PRIMARY OSTEOARTHRITIS OF RIGHT KNEE: Status: ACTIVE | Noted: 2025-04-02

## 2025-04-02 PROBLEM — E78.5 DYSLIPIDEMIA: Status: ACTIVE | Noted: 2022-05-11

## 2025-04-02 PROBLEM — R10.9 ACUTE FLANK PAIN: Status: ACTIVE | Noted: 2021-07-27

## 2025-04-02 PROBLEM — R32 INCONTINENCE: Status: ACTIVE | Noted: 2025-04-02

## 2025-04-02 PROBLEM — R42 VERTIGO: Status: ACTIVE | Noted: 2022-04-25

## 2025-04-02 PROBLEM — N39.0 RECURRENT URINARY TRACT INFECTION: Status: ACTIVE | Noted: 2025-04-02

## 2025-04-02 PROBLEM — M81.0 AGE RELATED OSTEOPOROSIS: Status: ACTIVE | Noted: 2021-10-26

## 2025-04-02 PROBLEM — M67.40 GANGLION OF JOINT: Status: ACTIVE | Noted: 2025-04-02

## 2025-04-02 PROBLEM — M51.24 DISPLACEMENT OF THORACIC INTERVERTEBRAL DISC WITHOUT MYELOPATHY: Status: ACTIVE | Noted: 2025-04-02

## 2025-04-02 PROBLEM — R35.0 INCREASED FREQUENCY OF URINATION: Status: ACTIVE | Noted: 2025-04-02

## 2025-04-02 PROBLEM — M51.9 DISORDER OF INTERVERTEBRAL DISC OF LUMBAR SPINE: Status: ACTIVE | Noted: 2025-04-02

## 2025-04-02 PROBLEM — K21.9 GASTROESOPHAGEAL REFLUX DISEASE WITHOUT ESOPHAGITIS: Status: ACTIVE | Noted: 2017-06-18

## 2025-04-02 PROBLEM — E78.2 MIXED HYPERLIPIDEMIA: Status: ACTIVE | Noted: 2017-06-18

## 2025-04-02 PROBLEM — L98.9 NON-HEALING SKIN LESION OF NOSE: Status: ACTIVE | Noted: 2018-03-06

## 2025-04-02 PROBLEM — I95.9 HYPOTENSION: Status: ACTIVE | Noted: 2022-05-11

## 2025-04-02 PROBLEM — M51.369 L4-L5 DISC BULGE: Status: ACTIVE | Noted: 2025-04-02

## 2025-04-02 PROBLEM — M25.552 LEFT HIP PAIN: Status: ACTIVE | Noted: 2021-10-26

## 2025-04-02 PROBLEM — M79.605 PAIN OF LEFT LOWER EXTREMITY: Status: ACTIVE | Noted: 2021-10-26

## 2025-04-02 PROBLEM — H66.002 ACUTE SUPPURATIVE OTITIS MEDIA OF LEFT EAR WITHOUT SPONTANEOUS RUPTURE OF TYMPANIC MEMBRANE: Status: ACTIVE | Noted: 2021-10-26

## 2025-04-02 PROBLEM — K21.00 GASTROESOPHAGEAL REFLUX DISEASE WITH ESOPHAGITIS: Status: ACTIVE | Noted: 2025-04-02

## 2025-04-02 PROBLEM — R60.9 FLUID RETENTION: Status: ACTIVE | Noted: 2022-05-11

## 2025-04-02 PROBLEM — C80.1 CANCER (MULTI): Status: ACTIVE | Noted: 2025-04-02

## 2025-04-02 PROBLEM — M17.12 PRIMARY OSTEOARTHRITIS OF LEFT KNEE: Status: ACTIVE | Noted: 2018-08-06

## 2025-04-02 PROBLEM — R53.83 FATIGUE: Status: ACTIVE | Noted: 2017-06-18

## 2025-04-02 PROBLEM — E78.5 HYPERLIPIDEMIA: Status: ACTIVE | Noted: 2025-04-02

## 2025-04-02 PROBLEM — M19.90 ARTHRITIS: Status: ACTIVE | Noted: 2025-04-02

## 2025-04-02 PROBLEM — K44.9 HIATAL HERNIA WITH GASTROESOPHAGEAL REFLUX: Status: ACTIVE | Noted: 2025-04-02

## 2025-04-02 PROBLEM — M79.672 LEFT FOOT PAIN: Status: ACTIVE | Noted: 2021-10-26

## 2025-04-02 PROBLEM — E03.9 HYPOACTIVE THYROID: Status: ACTIVE | Noted: 2025-04-02

## 2025-04-02 PROBLEM — Z96.651 HISTORY OF TOTAL RIGHT KNEE REPLACEMENT (TKR): Status: ACTIVE | Noted: 2018-08-06

## 2025-04-02 PROBLEM — K21.9 HIATAL HERNIA WITH GASTROESOPHAGEAL REFLUX: Status: ACTIVE | Noted: 2025-04-02

## 2025-04-02 PROBLEM — M54.50 LOWER BACK PAIN: Status: ACTIVE | Noted: 2025-04-02

## 2025-04-02 PROBLEM — J45.909 ASTHMA: Status: ACTIVE | Noted: 2025-04-02

## 2025-04-02 PROBLEM — Z86.39 HISTORY OF HYPERCHOLESTEROLEMIA: Status: ACTIVE | Noted: 2025-04-02

## 2025-04-03 ENCOUNTER — OFFICE VISIT (OUTPATIENT)
Dept: VASCULAR SURGERY | Facility: HOSPITAL | Age: 60
End: 2025-04-03
Payer: COMMERCIAL

## 2025-04-03 VITALS
HEIGHT: 65 IN | DIASTOLIC BLOOD PRESSURE: 72 MMHG | OXYGEN SATURATION: 96 % | WEIGHT: 189 LBS | BODY MASS INDEX: 31.49 KG/M2 | SYSTOLIC BLOOD PRESSURE: 110 MMHG | HEART RATE: 90 BPM

## 2025-04-03 DIAGNOSIS — I83.812 VARICOSE VEINS OF LEG WITH PAIN, LEFT: Primary | ICD-10-CM

## 2025-04-03 PROCEDURE — 99213 OFFICE O/P EST LOW 20 MIN: CPT | Performed by: SURGERY

## 2025-04-03 PROCEDURE — 1036F TOBACCO NON-USER: CPT | Performed by: SURGERY

## 2025-04-03 PROCEDURE — 99203 OFFICE O/P NEW LOW 30 MIN: CPT | Performed by: SURGERY

## 2025-04-03 PROCEDURE — 3008F BODY MASS INDEX DOCD: CPT | Performed by: SURGERY

## 2025-04-03 RX ORDER — NITROFURANTOIN 25; 75 MG/1; MG/1
100 CAPSULE ORAL EVERY 12 HOURS SCHEDULED
COMMUNITY

## 2025-04-03 ASSESSMENT — ENCOUNTER SYMPTOMS
DEPRESSION: 0
LOSS OF SENSATION IN FEET: 0
OCCASIONAL FEELINGS OF UNSTEADINESS: 0

## 2025-04-03 ASSESSMENT — PAIN SCALES - GENERAL: PAINLEVEL_OUTOF10: 0-NO PAIN

## 2025-04-03 NOTE — PROGRESS NOTES
NPV REASON: CVI  Referring Provider: Dr Mccall    CURRENT ENCOUNTER:  Ioana Mckeon is 60 y.o. female here for NPV for CVI.  symptomatic varicose veins of the left leg for years and newer ones in the right leg.  She has associated left leg pain worse at the end of the day  Better first thing in the morning  Feels her left leg always looks bigger   Started on compression but marginal change in her sx    Has aching throbbing and burning over the day  Has sharp pains down to foot throughout the day  Has a feeling of restless legs at night - mainly the left  Primary concern is the left leg.   Some ankle edema - mostly left.     Using medical grade compression stockings: 20-30mmHg knee high  Previous vein procedures: none  Previous phlebitis/DVT/PE: no  Previous venous ulcers: none  Family hx of varicose veins: mom;       RIGHT LEG C1 Telangiectasias or reticular veins  RIGHT LEG PAIN 1 and USE OF COMPRESSION 3  RIGHT LEG CEAP: C1  RIGHT LEG VCSS SCORE: 4    LEFT LEG C2 Varicose Veins and C3 Edema  LEFT LEG PAIN 2, VARICOSE VEINS 2, VENOUS EDEMA 1, and USE OF COMPRESSION 3  LEFT LEG CEAP: C3  LEFT LEG VCSS SCORE: 8      PastMedHX:  Past Medical History:   Diagnosis Date    A-fib (Multi)     (isolated event ) ~10yrs ago    Arthritis     Asthma     Chronic pain disorder     GERD (gastroesophageal reflux disease)     Hiatal hernia     Hyperlipidemia     Joint pain     Nephrolithiasis     Osteoarthritis     Ovarian cancer (Multi) 2007    Personal history of other diseases of the respiratory system     History of bronchitis    Personal history of other endocrine, nutritional and metabolic disease     History of hypercholesterolemia    PONV (postoperative nausea and vomiting)     Spinal stenosis     Varicose veins of left lower extremity with pain        Meds:   Current Outpatient Medications:     aspirin 81 mg EC tablet, Take 1 tablet (81 mg) by mouth once daily., Disp: , Rfl:     ciprofloxacin (Cipro) 500 mg tablet, Take 1  tablet (500 mg) by mouth 2 times a day., Disp: , Rfl:     elderberry fruit 350 mg capsule, Take 1 capsule by mouth once daily., Disp: , Rfl:     ergocalciferol (Vitamin D-2) 1.25 MG (93143 UT) capsule, Take 1 capsule (50,000 Units) by mouth 2 times a week. On Monday and Thursday, Disp: , Rfl:     methocarbamol (Robaxin) 500 mg tablet, Take 1-2 tablets by mouth every 8 hours as needed for spasms, Disp: 60 tablet, Rfl: 2    montelukast (Singulair) 5 mg chewable tablet, Chew 1 tablet (5 mg) once daily at bedtime., Disp: , Rfl:     oxybutynin XL (Ditropan-XL) 5 mg 24 hr tablet, Take 1 tablet (5 mg) by mouth once daily. Do not crush, chew, or split., Disp: , Rfl:     oxyCODONE (Roxicodone) 5 mg immediate release tablet, Take 1 tablet (5 mg) by mouth every 4 hours if needed for severe pain (7 - 10)., Disp: 42 tablet, Rfl: 0    pantoprazole (ProtoNix) 40 mg EC tablet, Take 1 tablet (40 mg) by mouth once daily in the morning. Take before meals. Do not crush, chew, or split., Disp: , Rfl:     polyethylene glycol (Miralax) 17 gram/dose powder, Mix 1 capful (17 g) of powder in 4-8 ounces of liquid and drink 2 times a day as needed for constipation., Disp: 510 g, Rfl: 0    rosuvastatin (Crestor) 5 mg tablet, Take 1 tablet (5 mg) by mouth once daily., Disp: , Rfl:     sucralfate (Carafate) 1 gram tablet, Take 1 tablet (1 g) by mouth 4 times a day before meals., Disp: , Rfl:     nitrofurantoin, macrocrystal-monohydrate, (Macrobid) 100 mg capsule, Take 1 capsule (100 mg) by mouth every 12 hours., Disp: , Rfl:     Allergies:   Allergies   Allergen Reactions    Penicillins Other    Sulfa (Sulfonamide Antibiotics) Hives    Tylox [Oxycodone-Acetaminophen] Hives       ROS:  Review of Systems     Objective:  Vitals:  Vitals:    04/03/25 1126   BP: 110/72   Pulse:    SpO2:         Exam:    No distress  Breathing comfortably   Not tachycardic  Palpable bilateral radial pulses  Bilateral legs with intact pulses   Bilateral perfused  feet  Mild left ankle edema  VV as noted on left leg                              Labs:  Lab Results   Component Value Date    WBC 2.5 (L) 01/13/2025    WBC 3.1 (L) 08/25/2023    WBC CANCELED 08/25/2023    WBC CANCELED 08/25/2023    HGB 12.0 01/13/2025    HGB 12.3 08/25/2023    HGB CANCELED 08/25/2023    HGB CANCELED 08/25/2023    HCT 36.9 01/13/2025    HCT 37.5 08/25/2023    HCT CANCELED 08/25/2023    HCT CANCELED 08/25/2023    MCV 93 01/13/2025    MCV 92 08/25/2023    MCV CANCELED 08/25/2023    MCV CANCELED 08/25/2023     01/13/2025     Lab Results   Component Value Date    CREATININE 0.93 01/13/2025    CREATININE 0.88 05/29/2021    CREATININE 0.86 05/28/2021    BUN 16 01/13/2025    BUN 14 05/29/2021    BUN 11 05/28/2021     01/13/2025     05/29/2021     05/28/2021    K 4.4 01/13/2025    K 4.3 05/29/2021    K 4.0 05/28/2021     01/13/2025     (H) 05/29/2021     05/28/2021    CO2 28 01/13/2025    CO2 28 05/29/2021    CO2 23 05/28/2021         Imaging:  none    Assessment & Plan:  Ioana Mckeon is 60 y.o. female here for NPV for CVI.  symptomatic varicose veins of the left leg for years and newer ones in the right leg.  She has associated left leg pain worse at the end of the day  Started on compression but marginal change in her sx    RIGHT LEG C1 Telangiectasias or reticular veins  RIGHT LEG VCSS SCORE: 4  LEFT LEG C2 Varicose Veins and C3 Edema  LEFT LEG VCSS SCORE: 8    1) please schedule your ultrasound and a video visit to follow up on results and the plan  2) continue your compression therapy as you are doing    Mary Ann Gary MD, MHS, RPVI  , [employer]  Director, Center for Comprehensive Venous Care, [facility]  Co-Director, Vascular Laboratories, [facility] [place]  Division of Vascular Surgery and Endovascular Therapy  [facility]  [facility]

## 2025-04-03 NOTE — PATIENT INSTRUCTIONS
It was a pleasure taking care of you today and appreciate your seeing us at our [facility].     Today's plan is as follows:  1) please schedule your ultrasound and a video visit to follow up on results and the plan  2) continue your compression therapy as you are doing      Please call the office with any questions at [phone].   You can speak to our secretaries or our clinical nurses for specific questions.   For [facility] Center specific questions, you can also call [phone] or email at [email or web address]  If you need coordinating your appointments and testing you can do these at the  or by calling my office shortly after your visit.

## 2025-04-07 ENCOUNTER — HOSPITAL ENCOUNTER (OUTPATIENT)
Dept: RADIOLOGY | Facility: EXTERNAL LOCATION | Age: 60
Discharge: HOME | End: 2025-04-07
Payer: COMMERCIAL

## 2025-04-08 ENCOUNTER — OFFICE VISIT (OUTPATIENT)
Dept: ORTHOPEDIC SURGERY | Facility: CLINIC | Age: 60
End: 2025-04-08
Payer: COMMERCIAL

## 2025-04-08 DIAGNOSIS — M48.061 DEGENERATIVE LUMBAR SPINAL STENOSIS: Primary | ICD-10-CM

## 2025-04-08 PROCEDURE — 1036F TOBACCO NON-USER: CPT | Performed by: ORTHOPAEDIC SURGERY

## 2025-04-08 PROCEDURE — 99211 OFF/OP EST MAY X REQ PHY/QHP: CPT | Performed by: ORTHOPAEDIC SURGERY

## 2025-04-08 NOTE — LETTER
April 8, 2025     Yon Pryor DO  [address] [place] [address]    Patient: Ioana Mckeon   YOB: 1965   Date of Visit: 4/8/2025       Dear Dr. Yon Pryor, :    Thank you for referring Ioana Mckeon to me for evaluation. Below are my notes for this consultation.  If you have questions, please do not hesitate to call me. I look forward to following your patient along with you.       Sincerely,     Javed Ayers MD      CC: No Recipients  ______________________________________________________________________________________    Yesi returns for second postoperative visit and she continues to do quite well.  She notes significant improvement in her lumbar radiculopathy.    Occasionally she gets some discomfort in her left leg at night.    On exam, posture is upright.  Strength intact.    X-rays show maintenance of alignment.    Ongoing good result.    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  She would like to return to work on April 14 at full time.    She will keep us updated on her progress and we will see her back as needed.    ** Dictated with voice recognition software and not immediately reviewed for errors in grammar and/or spelling **

## 2025-04-08 NOTE — LETTER
April 8, 2025     Patient: Ioana Mckeon   YOB: 1965   Date of Visit: 4/8/2025       To Whom It May Concern:    It is my medical opinion that Ioana Mckeon may return to work on 4/14/25 on fully duty w/ no lifting over 15 lbs .    If you have any questions or concerns, please don't hesitate to call.         Sincerely,        Javed Ayers MD    CC: No Recipients

## 2025-04-08 NOTE — Clinical Note
April 8, 2025     Patient: Ioana Mckeon   YOB: 1965   Date of Visit: 4/8/2025       To Whom It May Concern:    It is my medical opinion that Ioana Mckeon {Work release (duty restriction):83658}.    If you have any questions or concerns, please don't hesitate to call.         Sincerely,        Javed Ayers MD    CC: No Recipients

## 2025-04-08 NOTE — PROGRESS NOTES
Yesi returns for second postoperative visit and she continues to do quite well.  She notes significant improvement in her lumbar radiculopathy.    Occasionally she gets some discomfort in her left leg at night.    On exam, posture is upright.  Strength intact.    X-rays show maintenance of alignment.    Ongoing good result.    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  She would like to return to work on April 14 at full time.    She will keep us updated on her progress and we will see her back as needed.    ** Dictated with voice recognition software and not immediately reviewed for errors in grammar and/or spelling **

## 2025-04-16 RX ORDER — NALOXONE HYDROCHLORIDE 0.4 MG/ML
0.2 INJECTION, SOLUTION INTRAMUSCULAR; INTRAVENOUS; SUBCUTANEOUS EVERY 5 MIN PRN
Status: CANCELLED | OUTPATIENT
Start: 2025-04-16

## 2025-04-16 RX ORDER — FLUMAZENIL 0.1 MG/ML
0.2 INJECTION INTRAVENOUS ONCE AS NEEDED
Status: CANCELLED | OUTPATIENT
Start: 2025-04-16

## 2025-04-17 ENCOUNTER — HOSPITAL ENCOUNTER (OUTPATIENT)
Dept: RADIOLOGY | Facility: HOSPITAL | Age: 60
Discharge: HOME | End: 2025-04-17
Payer: COMMERCIAL

## 2025-04-17 ENCOUNTER — APPOINTMENT (OUTPATIENT)
Dept: RADIOLOGY | Facility: HOSPITAL | Age: 60
End: 2025-04-17
Payer: COMMERCIAL

## 2025-04-17 VITALS
WEIGHT: 195 LBS | HEART RATE: 64 BPM | TEMPERATURE: 97.7 F | BODY MASS INDEX: 32.45 KG/M2 | RESPIRATION RATE: 14 BRPM | DIASTOLIC BLOOD PRESSURE: 73 MMHG | SYSTOLIC BLOOD PRESSURE: 116 MMHG | OXYGEN SATURATION: 94 %

## 2025-04-17 DIAGNOSIS — D72.819 LEUKOPENIA, UNSPECIFIED TYPE: ICD-10-CM

## 2025-04-17 LAB
BASOPHILS # BLD AUTO: 0.02 X10*3/UL (ref 0–0.1)
BASOPHILS NFR BLD AUTO: 0.7 %
EOSINOPHIL # BLD AUTO: 0.07 X10*3/UL (ref 0–0.7)
EOSINOPHIL NFR BLD AUTO: 2.6 %
ERYTHROCYTE [DISTWIDTH] IN BLOOD BY AUTOMATED COUNT: 13.9 % (ref 11.5–14.5)
ERYTHROCYTE [DISTWIDTH] IN BLOOD BY AUTOMATED COUNT: 13.9 % (ref 11.5–14.5)
HCT VFR BLD AUTO: 34.4 % (ref 36–46)
HCT VFR BLD AUTO: 34.4 % (ref 36–46)
HGB BLD-MCNC: 11.4 G/DL (ref 12–16)
HGB BLD-MCNC: 11.4 G/DL (ref 12–16)
IMM GRANULOCYTES # BLD AUTO: 0 X10*3/UL (ref 0–0.7)
IMM GRANULOCYTES NFR BLD AUTO: 0 % (ref 0–0.9)
LYMPHOCYTES # BLD AUTO: 1.1 X10*3/UL (ref 1.2–4.8)
LYMPHOCYTES NFR BLD AUTO: 40.7 %
MCH RBC QN AUTO: 29.3 PG (ref 26–34)
MCH RBC QN AUTO: 29.3 PG (ref 26–34)
MCHC RBC AUTO-ENTMCNC: 33.1 G/DL (ref 32–36)
MCHC RBC AUTO-ENTMCNC: 33.1 G/DL (ref 32–36)
MCV RBC AUTO: 88 FL (ref 80–100)
MCV RBC AUTO: 88 FL (ref 80–100)
MONOCYTES # BLD AUTO: 0.41 X10*3/UL (ref 0.1–1)
MONOCYTES NFR BLD AUTO: 15.2 %
NEUTROPHILS # BLD AUTO: 1.1 X10*3/UL (ref 1.2–7.7)
NEUTROPHILS NFR BLD AUTO: 40.8 %
NRBC BLD-RTO: 0 /100 WBCS (ref 0–0)
NRBC BLD-RTO: 0 /100 WBCS (ref 0–0)
PLATELET # BLD AUTO: 195 X10*3/UL (ref 150–450)
PLATELET # BLD AUTO: 195 X10*3/UL (ref 150–450)
RBC # BLD AUTO: 3.89 X10*6/UL (ref 4–5.2)
RBC # BLD AUTO: 3.89 X10*6/UL (ref 4–5.2)
WBC # BLD AUTO: 2.6 X10*3/UL (ref 4.4–11.3)
WBC # BLD AUTO: 2.6 X10*3/UL (ref 4.4–11.3)

## 2025-04-17 PROCEDURE — 99152 MOD SED SAME PHYS/QHP 5/>YRS: CPT

## 2025-04-17 PROCEDURE — 88185 FLOWCYTOMETRY/TC ADD-ON: CPT | Mod: TC,PORLAB | Performed by: INTERNAL MEDICINE

## 2025-04-17 PROCEDURE — 36415 COLL VENOUS BLD VENIPUNCTURE: CPT | Performed by: INTERNAL MEDICINE

## 2025-04-17 PROCEDURE — 85025 COMPLETE CBC W/AUTO DIFF WBC: CPT | Performed by: NURSE PRACTITIONER

## 2025-04-17 PROCEDURE — 7100000010 HC PHASE TWO TIME - EACH INCREMENTAL 1 MINUTE

## 2025-04-17 PROCEDURE — 2500000004 HC RX 250 GENERAL PHARMACY W/ HCPCS (ALT 636 FOR OP/ED): Mod: JZ | Performed by: RADIOLOGY

## 2025-04-17 PROCEDURE — 85097 BONE MARROW INTERPRETATION: CPT | Mod: PORLAB | Performed by: INTERNAL MEDICINE

## 2025-04-17 PROCEDURE — 2720000007 HC OR 272 NO HCPCS

## 2025-04-17 PROCEDURE — 7100000009 HC PHASE TWO TIME - INITIAL BASE CHARGE

## 2025-04-17 PROCEDURE — 99152 MOD SED SAME PHYS/QHP 5/>YRS: CPT | Performed by: RADIOLOGY

## 2025-04-17 PROCEDURE — 2500000005 HC RX 250 GENERAL PHARMACY W/O HCPCS: Performed by: NURSE PRACTITIONER

## 2025-04-17 PROCEDURE — 77012 CT SCAN FOR NEEDLE BIOPSY: CPT

## 2025-04-17 PROCEDURE — 36415 COLL VENOUS BLD VENIPUNCTURE: CPT | Performed by: NURSE PRACTITIONER

## 2025-04-17 PROCEDURE — 2500000004 HC RX 250 GENERAL PHARMACY W/ HCPCS (ALT 636 FOR OP/ED): Mod: JZ | Performed by: NURSE PRACTITIONER

## 2025-04-17 RX ORDER — MIDAZOLAM HYDROCHLORIDE 1 MG/ML
INJECTION, SOLUTION INTRAMUSCULAR; INTRAVENOUS
Status: COMPLETED | OUTPATIENT
Start: 2025-04-17 | End: 2025-04-17

## 2025-04-17 RX ORDER — HEPARIN 100 UNIT/ML
500 SYRINGE INTRAVENOUS ONCE AS NEEDED
Status: DISCONTINUED | OUTPATIENT
Start: 2025-04-17 | End: 2025-04-17

## 2025-04-17 RX ORDER — ONDANSETRON HYDROCHLORIDE 2 MG/ML
4 INJECTION, SOLUTION INTRAVENOUS EVERY 6 HOURS PRN
Status: DISCONTINUED | OUTPATIENT
Start: 2025-04-17 | End: 2025-04-18 | Stop reason: HOSPADM

## 2025-04-17 RX ORDER — FENTANYL CITRATE 50 UG/ML
INJECTION, SOLUTION INTRAMUSCULAR; INTRAVENOUS
Status: COMPLETED | OUTPATIENT
Start: 2025-04-17 | End: 2025-04-17

## 2025-04-17 RX ORDER — ACETAMINOPHEN 500 MG
500 TABLET ORAL EVERY 6 HOURS PRN
COMMUNITY

## 2025-04-17 RX ORDER — LIDOCAINE HYDROCHLORIDE 20 MG/ML
INJECTION, SOLUTION EPIDURAL; INFILTRATION; INTRACAUDAL; PERINEURAL
Status: COMPLETED | OUTPATIENT
Start: 2025-04-17 | End: 2025-04-17

## 2025-04-17 RX ADMIN — FENTANYL CITRATE 100 MCG: 50 INJECTION INTRAMUSCULAR; INTRAVENOUS at 08:34

## 2025-04-17 RX ADMIN — ONDANSETRON 4 MG: 2 INJECTION INTRAMUSCULAR; INTRAVENOUS at 08:00

## 2025-04-17 RX ADMIN — LIDOCAINE HYDROCHLORIDE 10 ML: 20 INJECTION, SOLUTION EPIDURAL; INFILTRATION; INTRACAUDAL; PERINEURAL at 08:36

## 2025-04-17 RX ADMIN — MIDAZOLAM 1 MG: 1 INJECTION INTRAMUSCULAR; INTRAVENOUS at 08:34

## 2025-04-17 RX ADMIN — Medication 2 L/MIN: at 08:30

## 2025-04-17 ASSESSMENT — PAIN DESCRIPTION - DESCRIPTORS: DESCRIPTORS: ACHING

## 2025-04-17 ASSESSMENT — PAIN SCALES - GENERAL
PAINLEVEL_OUTOF10: 0 - NO PAIN
PAINLEVEL_OUTOF10: 7
PAINLEVEL_OUTOF10: 0 - NO PAIN

## 2025-04-17 ASSESSMENT — PAIN - FUNCTIONAL ASSESSMENT
PAIN_FUNCTIONAL_ASSESSMENT: 0-10

## 2025-04-17 NOTE — DISCHARGE INSTRUCTIONS
If you have any questions about your home going instructions you can call the [facility] Interventional Radiology team at [phone] and ask to speak with an IR nurse. We are available Monday-Friday from 7 AM to 3:30 PM. If it is after hours you may contact the provider that referred to IR. If you are experiencing an emergency please call 911 or report to your nearest emergency room.     You may also reach the IR NP Katherine Estrada at [phone] for non-urgent issues. Leave a voicemail and if you do not hear back from her within 1 hour please call the IR team at [phone] and ask to speak with Nurse Practitioner Katherine.     After your bone marrow biopsy do not do anything strenous for the first 24 hours. Do not operate heavy machinery or drive a motor vehicle for 24 hours. You can eat and drink as normal.     Remove your biopsy dressing in 24 hours after your procedure. Clean with soap and water, rinse and dry. Leave the area uncovered. Report any signs of infection redness, drainage, temperature greater than 101 degrees farenheit. Increased pain at the site.     Please make sure you have a follow up scheduled with the provider that ordered the biopsy to discuss the results.

## 2025-04-17 NOTE — PRE-SEDATION DOCUMENTATION
Interventional Radiology Preprocedure Note    Ioana Mckeon   Indication for procedure: The encounter diagnosis was Leukopenia, unspecified type. Patient here for bone marrow biopsy.    Relevant review of systems: NA      /72   Pulse 70   Temp 36.5 °C (97.7 °F) (Tympanic)   Resp 13   Wt 88.5 kg (195 lb)   SpO2 98%   BMI 32.45 kg/m²    Relevant Labs:   Lab Results   Component Value Date    CREATININE 0.93 01/13/2025    EGFR 70 01/13/2025    INR 1.0 01/13/2025    PROTIME 10.9 01/13/2025       Planned Sedation/Anesthesia: Moderate    Airway assessment: normal    Directed physical examination:    Alert and oriented X 3, lungs CTAB, heart sounds normal S1 and S2    Mallampati: II (hard and soft palate, upper portion of tonsils and uvula visible)    ASA Score: ASA 3 - Patient with moderate systemic disease with functional limitations    Benefits, risks and alternatives of procedure and planned sedation have been discussed with the patient and/or their representative. All questions answered and they agree to proceed.     Michelle Estrada, APRN-CNP

## 2025-04-17 NOTE — PROGRESS NOTES
Final access site assessment WNL. Dressing clean, dry, and intact. IV removed and dressing applied.     Homegoing instructions specific to procedure given, patient verbalized understanding.  Discharge criteria met: patient easily arousable, responding appropriately. Vital signs +/- 20% of preprocedure baseline. Significant complications absent. Ambulates without dizziness. Pulse ox > 92% on room air or baseline O2.     Patient discharged to home, accompanied by family. Discharged via wheelchair.

## 2025-04-17 NOTE — POST-PROCEDURE NOTE
Interventional Radiology Brief Postprocedure Note    Attending: Blayne Ray MD      Assistant: none    Diagnosis: leukopenia    Description of procedure: bone marrow biopsy     Anesthesia:  Local, mod sed    Complications: None    Estimated Blood Loss: minimal    Samples collected by on site hematology techs.     The patient tolerated the procedure well without incident or complication.

## 2025-04-17 NOTE — Clinical Note
The site was marked. Prepped with: ChloraPrep. The patient was draped. LOW BACK PREPPED BY DR MONROY

## 2025-04-18 LAB
PATH REPORT.COMMENTS IMP SPEC: NORMAL
PATH REPORT.FINAL DX SPEC: NORMAL
PATH REPORT.GROSS SPEC: NORMAL
PATH REPORT.MICROSCOPIC SPEC OTHER STN: NORMAL
PATH REPORT.RELEVANT HX SPEC: NORMAL
PATH REPORT.TOTAL CANCER: NORMAL

## 2025-04-19 LAB
CELL COUNT (BLOOD): 1.86 X10*3/UL
CELL POPULATIONS: NORMAL
DIAGNOSIS: NORMAL
FLOW DIFFERENTIAL: NORMAL
FLOW TEST ORDERED: NORMAL
LAB TEST METHOD: NORMAL
NUMBER OF CELLS COLLECTED: NORMAL
PATH REPORT.TOTAL CANCER: NORMAL
SIGNATURE COMMENT: NORMAL
SPECIMEN VIABILITY: NORMAL

## 2025-04-22 ENCOUNTER — APPOINTMENT (OUTPATIENT)
Dept: VASCULAR MEDICINE | Facility: HOSPITAL | Age: 60
End: 2025-04-22
Payer: COMMERCIAL

## 2025-04-23 LAB
ELECTRONICALLY SIGNED BY: NORMAL
MYELOID NGS RESULTS: NORMAL

## 2025-04-23 PROCEDURE — G0452 MOLECULAR PATHOLOGY INTERPR: HCPCS | Performed by: STUDENT IN AN ORGANIZED HEALTH CARE EDUCATION/TRAINING PROGRAM

## 2025-04-23 PROCEDURE — 81450 HL NEO GSAP 5-50DNA/DNA&RNA: CPT | Performed by: INTERNAL MEDICINE

## 2025-04-29 ENCOUNTER — HOSPITAL ENCOUNTER (OUTPATIENT)
Dept: VASCULAR MEDICINE | Facility: HOSPITAL | Age: 60
Discharge: HOME | End: 2025-04-29
Payer: COMMERCIAL

## 2025-04-29 DIAGNOSIS — I83.812 VARICOSE VEINS OF LEG WITH PAIN, LEFT: ICD-10-CM

## 2025-04-29 LAB
BAND RESOLUTION: 0 BANDS
CHROM ANALY OVERALL INTERP-IMP: NORMAL
CHROMOSOME ANALYSIS CELLS ANALYZED: 0 CELLS
CHROMOSOME ANALYSIS CELLS IMAGED: 0 CELLS
CHROMOSOME ANALYSIS HYPERMODAL CELL COUNT: 0 CELLS
CHROMOSOME ANALYSIS HYPOMODAL CELL COUNT: 0 CELLS
CHROMOSOME ANALYSIS MODAL CHROMOSOME NO: 0 CHROMOSOMES
CHROMOSOME ANALYSIS STAINING METHOD: NORMAL
ELECTRONICALLY SIGNED BY CYTOGENETICS: NORMAL
KARYOTYP MAR: 0 CELLS
TOTAL CELLS COUNTED MAR: 0 CELLS

## 2025-04-29 PROCEDURE — 88264 CHROMOSOME ANALYSIS 20-25: CPT | Performed by: INTERNAL MEDICINE

## 2025-04-29 PROCEDURE — 93971 EXTREMITY STUDY: CPT | Performed by: SURGERY

## 2025-04-29 PROCEDURE — 93971 EXTREMITY STUDY: CPT

## 2025-05-06 ENCOUNTER — OFFICE VISIT (OUTPATIENT)
Dept: HEMATOLOGY/ONCOLOGY | Facility: CLINIC | Age: 60
End: 2025-05-06
Payer: COMMERCIAL

## 2025-05-06 VITALS
TEMPERATURE: 97 F | OXYGEN SATURATION: 96 % | HEART RATE: 77 BPM | SYSTOLIC BLOOD PRESSURE: 111 MMHG | BODY MASS INDEX: 31.37 KG/M2 | DIASTOLIC BLOOD PRESSURE: 74 MMHG | RESPIRATION RATE: 17 BRPM | WEIGHT: 188.49 LBS

## 2025-05-06 DIAGNOSIS — D72.819 LEUKOPENIA, UNSPECIFIED TYPE: ICD-10-CM

## 2025-05-06 PROCEDURE — 99214 OFFICE O/P EST MOD 30 MIN: CPT | Performed by: INTERNAL MEDICINE

## 2025-05-06 SDOH — ECONOMIC STABILITY: FOOD INSECURITY: WITHIN THE PAST 12 MONTHS, THE FOOD YOU BOUGHT JUST DIDN'T LAST AND YOU DIDN'T HAVE MONEY TO GET MORE.: NEVER TRUE

## 2025-05-06 SDOH — ECONOMIC STABILITY: FOOD INSECURITY: WITHIN THE PAST 12 MONTHS, YOU WORRIED THAT YOUR FOOD WOULD RUN OUT BEFORE YOU GOT THE MONEY TO BUY MORE.: NEVER TRUE

## 2025-05-06 ASSESSMENT — ENCOUNTER SYMPTOMS
GASTROINTESTINAL NEGATIVE: 1
CONSTITUTIONAL NEGATIVE: 1
RESPIRATORY NEGATIVE: 1
CARDIOVASCULAR NEGATIVE: 1

## 2025-05-06 ASSESSMENT — PAIN SCALES - GENERAL: PAINLEVEL_OUTOF10: 0-NO PAIN

## 2025-05-06 NOTE — PROGRESS NOTES
Patient ID: Ioana Mckeon is a 60 y.o. female.  Referring Physician: Jayro Villatoro MD  [id number] [place] [address]  Primary Care Provider: Yon Pryor DO  Visit Type:  Follow Up     Subjective    HPI  Referred for evaluation for Leukopenia.     IgGAM normal     Review of Systems   Constitutional: Negative.  HENT:  Negative.   Respiratory: Negative.     Cardiovascular: Negative.  Gastrointestinal: Negative.       Flow Cytometry Report: 8/25/2024: Comment: There is a relative increase in naive B cells with a relative decrease in  non-switched memory/marginal zone B cells and switched memory B cells.  Plasmablasts were not enumerated due to ageing of sample ex vivo.   A similar pattern may be seen in common variable immunodeficiency. SPEP normal.      Review of Systems   Constitutional: Negative.    HENT:  Negative.     Respiratory: Negative.     Cardiovascular: Negative.    Gastrointestinal: Negative.         Objective   BSA: 1.98 meters squared  /74 (BP Location: Right arm, Patient Position: Sitting, BP Cuff Size: Adult)   Pulse 77   Temp 36.1 °C (97 °F) (Temporal)   Resp 17   Wt 85.5 kg (188 lb 7.9 oz)   SpO2 96%   BMI 31.37 kg/m²      has a past medical history of A-fib (Multi), Arthritis, Asthma, Chronic pain disorder, GERD (gastroesophageal reflux disease), Hiatal hernia, Hyperlipidemia, Joint pain, Nephrolithiasis, Osteoarthritis, Ovarian cancer (Multi) (2007), Personal history of other diseases of the respiratory system, Personal history of other endocrine, nutritional and metabolic disease, PONV (postoperative nausea and vomiting), Spinal stenosis, and Varicose veins of left lower extremity with pain.    She has no past medical history of Personal history of irradiation.   has a past surgical history that includes Cholecystectomy; Other surgical history; Hysterectomy; Knee Arthroplasty (Bilateral); XR humerus (Right); Tubal ligation; and Back surgery  ([id number]).  Family History[1]  Oncology History    No history exists.       Ioana Mckeon  reports that she has never smoked. She has never used smokeless tobacco.  She  reports current alcohol use.  She  reports no history of drug use.    Physical Exam  Constitutional:       Appearance: Normal appearance.   HENT:      Head: Normocephalic and atraumatic.   Eyes:      Extraocular Movements: Extraocular movements intact.      Pupils: Pupils are equal, round, and reactive to light.   Neurological:      Mental Status: She is alert.         WBC   Date/Time Value Ref Range Status   04/17/2025 07:51 AM 2.6 (L) 4.4 - 11.3 x10*3/uL Final   04/17/2025 07:51 AM 2.6 (L) 4.4 - 11.3 x10*3/uL Final   01/13/2025 09:18 AM 2.5 (L) 4.4 - 11.3 x10*3/uL Final     nRBC   Date Value Ref Range Status   04/17/2025 0.0 0.0 - 0.0 /100 WBCs Final   04/17/2025 0.0 0.0 - 0.0 /100 WBCs Final   01/13/2025 0.0 0.0 - 0.0 /100 WBCs Final     RBC   Date Value Ref Range Status   04/17/2025 3.89 (L) 4.00 - 5.20 x10*6/uL Final   04/17/2025 3.89 (L) 4.00 - 5.20 x10*6/uL Final   01/13/2025 3.98 (L) 4.00 - 5.20 x10*6/uL Final     Hemoglobin   Date Value Ref Range Status   04/17/2025 11.4 (L) 12.0 - 16.0 g/dL Final   04/17/2025 11.4 (L) 12.0 - 16.0 g/dL Final   01/13/2025 12.0 12.0 - 16.0 g/dL Final     Hematocrit   Date Value Ref Range Status   04/17/2025 34.4 (L) 36.0 - 46.0 % Final   04/17/2025 34.4 (L) 36.0 - 46.0 % Final   01/13/2025 36.9 36.0 - 46.0 % Final     MCV   Date/Time Value Ref Range Status   04/17/2025 07:51 AM 88 80 - 100 fL Final   04/17/2025 07:51 AM 88 80 - 100 fL Final   01/13/2025 09:18 AM 93 80 - 100 fL Final     MCH   Date/Time Value Ref Range Status   04/17/2025 07:51 AM 29.3 26.0 - 34.0 pg Final   04/17/2025 07:51 AM 29.3 26.0 - 34.0 pg Final   01/13/2025 09:18 AM 30.2 26.0 - 34.0 pg Final     MCHC   Date/Time Value Ref Range Status   04/17/2025 07:51 AM 33.1 32.0 - 36.0 g/dL Final   04/17/2025 07:51 AM 33.1 32.0 - 36.0 g/dL Final  "  01/13/2025 09:18 AM 32.5 32.0 - 36.0 g/dL Final     RDW   Date/Time Value Ref Range Status   04/17/2025 07:51 AM 13.9 11.5 - 14.5 % Final   04/17/2025 07:51 AM 13.9 11.5 - 14.5 % Final   01/13/2025 09:18 AM 13.1 11.5 - 14.5 % Final     Platelets   Date/Time Value Ref Range Status   04/17/2025 07:51  150 - 450 x10*3/uL Final   04/17/2025 07:51  150 - 450 x10*3/uL Final   01/13/2025 09:18  150 - 450 x10*3/uL Final     No results found for: \"MPV\"  Neutrophils %   Date/Time Value Ref Range Status   04/17/2025 07:51 AM 40.8 40.0 - 80.0 % Final   08/25/2023 10:04 AM 49.9 40.0 - 80.0 % Final   08/25/2023 10:04 AM CANCELED       Comment:     Result canceled by the ancillary.   08/25/2023 10:04 AM CANCELED       Comment:     Result canceled by the ancillary.     Immature Granulocytes %, Automated   Date/Time Value Ref Range Status   04/17/2025 07:51 AM 0.0 0.0 - 0.9 % Final     Comment:     Immature Granulocyte Count (IG) includes promyelocytes, myelocytes and metamyelocytes but does not include bands. Percent differential counts (%) should be interpreted in the context of the absolute cell counts (cells/UL).   08/25/2023 10:04 AM 0.3 0.0 - 0.9 % Final     Comment:      Immature Granulocyte Count (IG) includes promyelocytes,    myelocytes and metamyelocytes but does not include bands.   Percent differential counts (%) should be interpreted in the   context of the absolute cell counts (cells/L).     08/25/2023 10:04 AM CANCELED       Comment:      Immature Granulocyte Count (IG) includes promyelocytes,    myelocytes and metamyelocytes but does not include bands.   Percent differential counts (%) should be interpreted in the   context of the absolute cell counts (cells/L).    Result canceled by the ancillary.     08/25/2023 10:04 AM CANCELED       Comment:      Immature Granulocyte Count (IG) includes promyelocytes,    myelocytes and metamyelocytes but does not include bands.   Percent differential counts " (%) should be interpreted in the   context of the absolute cell counts (cells/L).    Result canceled by the ancillary.       Lymphocytes %   Date/Time Value Ref Range Status   04/17/2025 07:51 AM 40.7 13.0 - 44.0 % Final   08/25/2023 10:04 AM 35.9 13.0 - 44.0 % Final   08/25/2023 10:04 AM CANCELED       Comment:     Result canceled by the ancillary.   08/25/2023 10:04 AM CANCELED       Comment:     Result canceled by the ancillary.     Monocytes %   Date/Time Value Ref Range Status   04/17/2025 07:51 AM 15.2 2.0 - 10.0 % Final   08/25/2023 10:04 AM 11.0 2.0 - 10.0 % Final   08/25/2023 10:04 AM CANCELED       Comment:     Result canceled by the ancillary.   08/25/2023 10:04 AM CANCELED       Comment:     Result canceled by the ancillary.     Eosinophils %   Date/Time Value Ref Range Status   04/17/2025 07:51 AM 2.6 0.0 - 6.0 % Final   08/25/2023 10:04 AM 2.3 0.0 - 6.0 % Final   08/25/2023 10:04 AM CANCELED       Comment:     Result canceled by the ancillary.   08/25/2023 10:04 AM CANCELED       Comment:     Result canceled by the ancillary.     Basophils %   Date/Time Value Ref Range Status   04/17/2025 07:51 AM 0.7 0.0 - 2.0 % Final   08/25/2023 10:04 AM 0.6 0.0 - 2.0 % Final   08/25/2023 10:04 AM CANCELED       Comment:     Result canceled by the ancillary.   08/25/2023 10:04 AM CANCELED       Comment:     Result canceled by the ancillary.     Neutrophils Absolute   Date/Time Value Ref Range Status   04/17/2025 07:51 AM 1.10 (L) 1.20 - 7.70 x10*3/uL Final     Comment:     Percent differential counts (%) should be interpreted in the context of the absolute cell counts (cells/uL).   08/25/2023 10:04 AM 1.54 1.20 - 7.70 x10E9/L Final   08/25/2023 10:04 AM CANCELED       Comment:     Result canceled by the ancillary.   08/25/2023 10:04 AM CANCELED       Comment:     Result canceled by the ancillary.     Immature Granulocytes Absolute, Automated   Date/Time Value Ref Range Status   04/17/2025 07:51 AM 0.00 0.00 - 0.70  "x10*3/uL Final     Lymphocytes Absolute   Date/Time Value Ref Range Status   04/17/2025 07:51 AM 1.10 (L) 1.20 - 4.80 x10*3/uL Final   08/25/2023 10:04 AM 1.11 (L) 1.20 - 4.80 x10E9/L Final   08/25/2023 10:04 AM CANCELED       Comment:     Result canceled by the ancillary.   08/25/2023 10:04 AM CANCELED       Comment:     Result canceled by the ancillary.     Monocytes Absolute   Date/Time Value Ref Range Status   04/17/2025 07:51 AM 0.41 0.10 - 1.00 x10*3/uL Final   08/25/2023 10:04 AM 0.34 0.10 - 1.00 x10E9/L Final   08/25/2023 10:04 AM CANCELED       Comment:     Result canceled by the ancillary.   08/25/2023 10:04 AM CANCELED       Comment:     Result canceled by the ancillary.     Eosinophils Absolute   Date/Time Value Ref Range Status   04/17/2025 07:51 AM 0.07 0.00 - 0.70 x10*3/uL Final   08/25/2023 10:04 AM 0.07 0.00 - 0.70 x10E9/L Final   08/25/2023 10:04 AM CANCELED       Comment:     Result canceled by the ancillary.   08/25/2023 10:04 AM CANCELED       Comment:     Result canceled by the ancillary.     Basophils Absolute   Date/Time Value Ref Range Status   04/17/2025 07:51 AM 0.02 0.00 - 0.10 x10*3/uL Final   08/25/2023 10:04 AM 0.02 0.00 - 0.10 x10E9/L Final   08/25/2023 10:04 AM CANCELED       Comment:     Result canceled by the ancillary.   08/25/2023 10:04 AM CANCELED       Comment:     Result canceled by the ancillary.       No components found for: \"PT\"  aPTT   Date/Time Value Ref Range Status   01/13/2025 09:18 AM 30 27 - 38 seconds Final   A, B & C. BONE MARROW CLOT WITH ASPIRATE AND CORE WITH TOUCH PREP, LEFT ILIAC CREST:       --NORMOCELLULAR BONE MARROW (30-60%) WITH NORMAL MATURING TRILINEAGE HEMATOPOIESIS, SEE NOTE.     Note: Genetic studies are pending.     Assessment/Plan      Patient referred with an isolated leukopenia and lymphocytopenia neutrophils appear normal.  TINA SPEP ordered.  Patient noted to also have a low percentage saturation of iron and serum ferritin has been ordered " again.     Will defer a bone marrow biopsy at this time.  Patient's symptoms appear to have began as per the record in the computer in 2021.  Admits extensive arthritis and arthropathy reminiscent of a rheumatologic abnormality.  Her leukopenia could be autoimmune related.    5/6/2025: BMBX normal No clonal abnormality. Noted that Methocarbamol causes Leukopenia. Side effects Hematologic & oncologic: Leukopenia. DC from [facility].     Diagnoses and all orders for this visit:  Leukopenia, unspecified type  -     Clinic Appointment Request Follow Up (review bmbx)           Jayro Villatoro MD                                [1]   Family History  Problem Relation Name Age of Onset    COPD Mother Chrissy Gagnon     Arthritis Mother Chrissy Gagnon     COPD Father Torey You     COPD Brother Rommel You     Heart disease Brother Rommel You     Depression Daughter Judith Mckeon     Learning disabilities Daughter Judith Mckeon     Mental illness Daughter Judith Mckeon     Miscarriages / Stillbirths Daughter Judith Mckeon     Cancer Mother's Sister Juani jurado

## 2025-05-06 NOTE — PATIENT INSTRUCTIONS
Today you met with your hematologist/oncologist.  Recent labs were discussed and questions answered.  Scheduling orders were placed.  While we appreciate that you verbalized understanding, if any questions arise after leaving, please do not hesitate to call the office to discuss.  [phone] Jigna Whitfield  You do not need to follow with Dr Villatoro at this time, please continue to follow with your PCP. If at any point your need re-evaluated, please call our office for an appointment.

## 2025-05-08 LAB
CHROM ANALY OVERALL INTERP-IMP: NORMAL
ELECTRONICALLY COSIGNED BY CYTOGENETICS: NORMAL
ELECTRONICALLY SIGNED BY CYTOGENETICS: NORMAL
FISH ISCN RESULTS: NORMAL

## 2025-05-08 PROCEDURE — 88271 CYTOGENETICS DNA PROBE: CPT | Performed by: RADIOLOGY

## 2025-05-08 PROCEDURE — 88291 CYTO/MOLECULAR REPORT: CPT | Performed by: PATHOLOGY

## 2025-05-15 LAB
PATH REPORT.ADDENDUM SPEC: NORMAL
PATH REPORT.COMMENTS IMP SPEC: NORMAL
PATH REPORT.FINAL DX SPEC: NORMAL
PATH REPORT.GROSS SPEC: NORMAL
PATH REPORT.MICROSCOPIC SPEC OTHER STN: NORMAL
PATH REPORT.RELEVANT HX SPEC: NORMAL
PATH REPORT.TOTAL CANCER: NORMAL

## 2025-05-28 ENCOUNTER — APPOINTMENT (OUTPATIENT)
Dept: VASCULAR SURGERY | Facility: CLINIC | Age: 60
End: 2025-05-28
Payer: COMMERCIAL

## 2025-05-28 DIAGNOSIS — I83.812 VARICOSE VEINS OF LEG WITH PAIN, LEFT: Primary | ICD-10-CM

## 2025-05-28 PROCEDURE — 99213 OFFICE O/P EST LOW 20 MIN: CPT | Performed by: SURGERY

## 2025-05-28 NOTE — PATIENT INSTRUCTIONS
It was a pleasure taking care of you today and appreciate your seeing us at our [facility].     Today's plan is as follows:  1) I would recommend left leg RFA versus venaseal for treatment  2) we will proceed with insurance approval   3) we can then evaluate need for any secondary procedure down the road after you undergo ablation.       Please call the office with any questions at [phone].   You can speak to our secretaries or our clinical nurses for specific questions.   For [facility] specific questions, you can also call [phone] or email at [email or web address]  If you need coordinating your appointments and testing you can do these at the  or by calling my office shortly after your visit.

## 2025-05-28 NOTE — PROGRESS NOTES
F/U REASON: CVI    CURRENT ENCOUNTER:  Ioana Mckeon is 60 y.o. female here for follow up of  CVI.  Virtual visit to discuss VI scan of left leg  Continues with compression and sx vv   Very active    Meds:   Current Medications[1]    Allergies:   RX Allergies[2]    ROS:  Review of Systems  otherwise unremarkable    Objective:  Vitals:  There were no vitals filed for this visit.     Exam:  No distress  Breathing comfortably  Nonlabored breathing    Labs:  Lab Results   Component Value Date    WBC 2.6 (L) 04/17/2025    WBC 2.6 (L) 04/17/2025    WBC 2.5 (L) 01/13/2025    HGB 11.4 (L) 04/17/2025    HGB 11.4 (L) 04/17/2025    HGB 12.0 01/13/2025    HCT 34.4 (L) 04/17/2025    HCT 34.4 (L) 04/17/2025    HCT 36.9 01/13/2025    MCV 88 04/17/2025    MCV 88 04/17/2025    MCV 93 01/13/2025     04/17/2025     04/17/2025     Lab Results   Component Value Date    CREATININE 0.93 01/13/2025    CREATININE 0.88 05/29/2021    CREATININE 0.86 05/28/2021    BUN 16 01/13/2025    BUN 14 05/29/2021    BUN 11 05/28/2021     01/13/2025     05/29/2021     05/28/2021    K 4.4 01/13/2025    K 4.3 05/29/2021    K 4.0 05/28/2021     01/13/2025     (H) 05/29/2021     05/28/2021    CO2 28 01/13/2025    CO2 28 05/29/2021    CO2 23 05/28/2021         Imaging:            Assessment & Plan:  Ioana Mckeon is 60 y.o. female here for NPV for CVI.  symptomatic varicose veins of the left leg for years and newer ones in the right leg.  She has associated left leg pain worse at the end of the day  Started on compression but marginal change in her sx     RIGHT LEG C1 Telangiectasias or reticular veins  RIGHT LEG VCSS SCORE: 4  LEFT LEG C2 Varicose Veins and C3 Edema  LEFT LEG VCSS SCORE: 8    VI with left GSV reflux    1) I would recommend left leg RFA versus venaseal for treatment  2) we will proceed with insurance approval   3) we can then evaluate need for any secondary procedure down the road after you  undergo ablation.     Mary Ann Gary MD, MHS, RPVI  , [facility] [employer]  Director, [facility], [facility] [place]  Co-Director, Vascular Laboratories, [facility] [place]  Division of Vascular Surgery and Endovascular Therapy  [facility]                 [1]   Current Outpatient Medications:     acetaminophen (Tylenol) 500 mg tablet, Take 1 tablet (500 mg) by mouth every 6 hours if needed for mild pain (1 - 3)., Disp: , Rfl:     aspirin 81 mg EC tablet, Take 1 tablet (81 mg) by mouth once daily., Disp: , Rfl:     elderberry fruit 350 mg capsule, Take 1 capsule by mouth once daily. (Patient not taking: Reported on 4/17/2025), Disp: , Rfl:     ergocalciferol (Vitamin D-2) 1.25 MG (66045 UT) capsule, Take 1 capsule (50,000 Units) by mouth 2 times a week. On Monday and Thursday (Patient not taking: Reported on 4/17/2025), Disp: , Rfl:     methocarbamol (Robaxin) 500 mg tablet, Take 1-2 tablets by mouth every 8 hours as needed for spasms, Disp: 60 tablet, Rfl: 2    montelukast (Singulair) 5 mg chewable tablet, Chew 1 tablet (5 mg) once daily at bedtime., Disp: , Rfl:     nitrofurantoin, macrocrystal-monohydrate, (Macrobid) 100 mg capsule, Take 1 capsule (100 mg) by mouth every 12 hours., Disp: , Rfl:     oxybutynin XL (Ditropan-XL) 5 mg 24 hr tablet, Take 1 tablet (5 mg) by mouth once daily. Do not crush, chew, or split., Disp: , Rfl:     pantoprazole (ProtoNix) 40 mg EC tablet, Take 1 tablet (40 mg) by mouth once daily in the morning. Take before meals. Do not crush, chew, or split., Disp: , Rfl:     rosuvastatin (Crestor) 5 mg tablet, Take 1 tablet (5 mg) by mouth once daily., Disp: , Rfl:     sucralfate (Carafate) 1 gram tablet, Take 1 tablet (1 g) by mouth 4 times a day before meals., Disp: , Rfl:   [2]   Allergies  Allergen Reactions    Penicillins Other    Sulfa  (Sulfonamide Antibiotics) Hives    Tylox [Oxycodone-Acetaminophen] Hives

## 2025-06-16 ENCOUNTER — HOSPITAL ENCOUNTER (EMERGENCY)
Age: 60
Discharge: HOME | End: 2025-06-16
Attending: STUDENT IN AN ORGANIZED HEALTH CARE EDUCATION/TRAINING PROGRAM
Payer: COMMERCIAL

## 2025-06-16 ENCOUNTER — APPOINTMENT (OUTPATIENT)
Dept: CT IMAGING | Age: 60
End: 2025-06-16
Payer: COMMERCIAL

## 2025-06-16 VITALS
OXYGEN SATURATION: 98 % | SYSTOLIC BLOOD PRESSURE: 116 MMHG | TEMPERATURE: 98.6 F | DIASTOLIC BLOOD PRESSURE: 55 MMHG | RESPIRATION RATE: 20 BRPM | WEIGHT: 195 LBS | BODY MASS INDEX: 32.45 KG/M2 | HEART RATE: 66 BPM

## 2025-06-16 DIAGNOSIS — R19.7 DIARRHEA, UNSPECIFIED TYPE: ICD-10-CM

## 2025-06-16 DIAGNOSIS — R93.5 ABNORMAL CT OF THE ABDOMEN: ICD-10-CM

## 2025-06-16 DIAGNOSIS — R11.0 NAUSEA: ICD-10-CM

## 2025-06-16 DIAGNOSIS — R10.31 RIGHT LOWER QUADRANT ABDOMINAL PAIN: Primary | ICD-10-CM

## 2025-06-16 LAB
ALBUMIN SERPL-MCNC: 4.5 G/DL (ref 3.5–5.2)
ALP SERPL-CCNC: 90 U/L (ref 35–104)
ALT SERPL-CCNC: 8 U/L (ref 0–35)
ANION GAP SERPL CALCULATED.3IONS-SCNC: 10 MMOL/L (ref 7–16)
AST SERPL-CCNC: 26 U/L (ref 0–35)
BASOPHILS # BLD: 0.02 K/UL (ref 0–0.2)
BASOPHILS NFR BLD: 1 % (ref 0–2)
BILIRUB DIRECT SERPL-MCNC: 0.1 MG/DL (ref 0–0.2)
BILIRUB INDIRECT SERPL-MCNC: 0.2 MG/DL (ref 0–1)
BILIRUB SERPL-MCNC: 0.3 MG/DL (ref 0–1.2)
BILIRUB UR QL STRIP: NEGATIVE
BUN SERPL-MCNC: 8 MG/DL (ref 8–23)
CALCIUM SERPL-MCNC: 9.5 MG/DL (ref 8.8–10.2)
CHLORIDE SERPL-SCNC: 103 MMOL/L (ref 98–107)
CLARITY UR: CLEAR
CO2 SERPL-SCNC: 26 MMOL/L (ref 22–29)
COLOR UR: YELLOW
CREAT SERPL-MCNC: 0.9 MG/DL (ref 0.5–1)
EOSINOPHIL # BLD: 0.05 K/UL (ref 0.05–0.5)
EOSINOPHILS RELATIVE PERCENT: 2 % (ref 0–6)
ERYTHROCYTE [DISTWIDTH] IN BLOOD BY AUTOMATED COUNT: 13.8 % (ref 11.5–15)
GFR, ESTIMATED: 69 ML/MIN/1.73M2
GLUCOSE SERPL-MCNC: 97 MG/DL (ref 74–99)
GLUCOSE UR STRIP-MCNC: NEGATIVE MG/DL
HCT VFR BLD AUTO: 35.4 % (ref 34–48)
HGB BLD-MCNC: 11.7 G/DL (ref 11.5–15.5)
HGB UR QL STRIP.AUTO: NEGATIVE
IMM GRANULOCYTES # BLD AUTO: <0.03 K/UL (ref 0–0.58)
IMM GRANULOCYTES NFR BLD: 0 % (ref 0–5)
INR PPP: 1
KETONES UR STRIP-MCNC: NEGATIVE MG/DL
LACTATE BLDV-SCNC: 1.3 MMOL/L (ref 0.5–2.2)
LEUKOCYTE ESTERASE UR QL STRIP: NEGATIVE
LIPASE SERPL-CCNC: 42 U/L (ref 13–60)
LYMPHOCYTES NFR BLD: 1.25 K/UL (ref 1.5–4)
LYMPHOCYTES RELATIVE PERCENT: 40 % (ref 20–42)
MAGNESIUM SERPL-MCNC: 2.1 MG/DL (ref 1.6–2.4)
MCH RBC QN AUTO: 29.1 PG (ref 26–35)
MCHC RBC AUTO-ENTMCNC: 33.1 G/DL (ref 32–34.5)
MCV RBC AUTO: 88.1 FL (ref 80–99.9)
MONOCYTES NFR BLD: 0.39 K/UL (ref 0.1–0.95)
MONOCYTES NFR BLD: 12 % (ref 2–12)
NEUTROPHILS NFR BLD: 45 % (ref 43–80)
NEUTS SEG NFR BLD: 1.43 K/UL (ref 1.8–7.3)
NITRITE UR QL STRIP: NEGATIVE
PH UR STRIP: 6 [PH] (ref 5–8)
PLATELET # BLD AUTO: 184 K/UL (ref 130–450)
PMV BLD AUTO: 11.9 FL (ref 7–12)
POTASSIUM SERPL-SCNC: 4.4 MMOL/L (ref 3.5–5.1)
PROT SERPL-MCNC: 7.5 G/DL (ref 6.4–8.3)
PROT UR STRIP-MCNC: NEGATIVE MG/DL
PROTHROMBIN TIME: 10.8 SEC (ref 9.3–12.4)
RBC # BLD AUTO: 4.02 M/UL (ref 3.5–5.5)
RBC #/AREA URNS HPF: ABNORMAL /HPF
SODIUM SERPL-SCNC: 140 MMOL/L (ref 136–145)
SP GR UR STRIP: <1.005 (ref 1–1.03)
UROBILINOGEN UR STRIP-ACNC: 0.2 EU/DL (ref 0–1)
WBC #/AREA URNS HPF: ABNORMAL /HPF
WBC OTHER # BLD: 3.2 K/UL (ref 4.5–11.5)

## 2025-06-16 PROCEDURE — 83690 ASSAY OF LIPASE: CPT

## 2025-06-16 PROCEDURE — 82248 BILIRUBIN DIRECT: CPT

## 2025-06-16 PROCEDURE — 6360000002 HC RX W HCPCS: Performed by: STUDENT IN AN ORGANIZED HEALTH CARE EDUCATION/TRAINING PROGRAM

## 2025-06-16 PROCEDURE — 96375 TX/PRO/DX INJ NEW DRUG ADDON: CPT

## 2025-06-16 PROCEDURE — 81001 URINALYSIS AUTO W/SCOPE: CPT

## 2025-06-16 PROCEDURE — 87086 URINE CULTURE/COLONY COUNT: CPT

## 2025-06-16 PROCEDURE — 83605 ASSAY OF LACTIC ACID: CPT

## 2025-06-16 PROCEDURE — 85025 COMPLETE CBC W/AUTO DIFF WBC: CPT

## 2025-06-16 PROCEDURE — 85610 PROTHROMBIN TIME: CPT

## 2025-06-16 PROCEDURE — 83735 ASSAY OF MAGNESIUM: CPT

## 2025-06-16 PROCEDURE — 80053 COMPREHEN METABOLIC PANEL: CPT

## 2025-06-16 PROCEDURE — 6360000004 HC RX CONTRAST MEDICATION: Performed by: RADIOLOGY

## 2025-06-16 PROCEDURE — 99285 EMERGENCY DEPT VISIT HI MDM: CPT

## 2025-06-16 PROCEDURE — 6370000000 HC RX 637 (ALT 250 FOR IP): Performed by: STUDENT IN AN ORGANIZED HEALTH CARE EDUCATION/TRAINING PROGRAM

## 2025-06-16 PROCEDURE — 96374 THER/PROPH/DIAG INJ IV PUSH: CPT

## 2025-06-16 PROCEDURE — 74177 CT ABD & PELVIS W/CONTRAST: CPT

## 2025-06-16 RX ORDER — MORPHINE SULFATE 4 MG/ML
4 INJECTION, SOLUTION INTRAMUSCULAR; INTRAVENOUS ONCE
Refills: 0 | Status: COMPLETED | OUTPATIENT
Start: 2025-06-16 | End: 2025-06-16

## 2025-06-16 RX ORDER — OXYCODONE AND ACETAMINOPHEN 5; 325 MG/1; MG/1
1 TABLET ORAL EVERY 8 HOURS PRN
Qty: 8 TABLET | Refills: 0 | Status: SHIPPED | OUTPATIENT
Start: 2025-06-16 | End: 2025-06-19

## 2025-06-16 RX ORDER — ONDANSETRON 2 MG/ML
4 INJECTION INTRAMUSCULAR; INTRAVENOUS ONCE
Status: COMPLETED | OUTPATIENT
Start: 2025-06-16 | End: 2025-06-16

## 2025-06-16 RX ORDER — DICYCLOMINE HCL 20 MG
20 TABLET ORAL EVERY 6 HOURS PRN
Qty: 20 TABLET | Refills: 0 | Status: SHIPPED | OUTPATIENT
Start: 2025-06-16

## 2025-06-16 RX ORDER — IOPAMIDOL 755 MG/ML
75 INJECTION, SOLUTION INTRAVASCULAR
Status: COMPLETED | OUTPATIENT
Start: 2025-06-16 | End: 2025-06-16

## 2025-06-16 RX ORDER — PREDNISONE 20 MG/1
20 TABLET ORAL ONCE
Status: COMPLETED | OUTPATIENT
Start: 2025-06-16 | End: 2025-06-16

## 2025-06-16 RX ORDER — ONDANSETRON 4 MG/1
4 TABLET, ORALLY DISINTEGRATING ORAL EVERY 8 HOURS PRN
Qty: 15 TABLET | Refills: 0 | Status: SHIPPED | OUTPATIENT
Start: 2025-06-16

## 2025-06-16 RX ORDER — OXYCODONE AND ACETAMINOPHEN 5; 325 MG/1; MG/1
1 TABLET ORAL ONCE
Refills: 0 | Status: COMPLETED | OUTPATIENT
Start: 2025-06-16 | End: 2025-06-16

## 2025-06-16 RX ORDER — PREDNISONE 20 MG/1
20 TABLET ORAL 2 TIMES DAILY
Qty: 10 TABLET | Refills: 0 | Status: SHIPPED | OUTPATIENT
Start: 2025-06-16 | End: 2025-06-21

## 2025-06-16 RX ADMIN — ONDANSETRON 4 MG: 2 INJECTION, SOLUTION INTRAMUSCULAR; INTRAVENOUS at 14:16

## 2025-06-16 RX ADMIN — MORPHINE SULFATE 4 MG: 4 INJECTION, SOLUTION INTRAMUSCULAR; INTRAVENOUS at 14:17

## 2025-06-16 RX ADMIN — PREDNISONE 20 MG: 20 TABLET ORAL at 18:48

## 2025-06-16 RX ADMIN — IOPAMIDOL 75 ML: 755 INJECTION, SOLUTION INTRAVENOUS at 15:48

## 2025-06-16 RX ADMIN — OXYCODONE AND ACETAMINOPHEN 1 TABLET: 5; 325 TABLET ORAL at 19:13

## 2025-06-16 ASSESSMENT — PAIN SCALES - GENERAL: PAINLEVEL_OUTOF10: 9

## 2025-06-16 ASSESSMENT — LIFESTYLE VARIABLES
HOW MANY STANDARD DRINKS CONTAINING ALCOHOL DO YOU HAVE ON A TYPICAL DAY: 1 OR 2
HOW OFTEN DO YOU HAVE A DRINK CONTAINING ALCOHOL: MONTHLY OR LESS

## 2025-06-16 ASSESSMENT — PAIN DESCRIPTION - LOCATION: LOCATION: ABDOMEN

## 2025-06-16 ASSESSMENT — PAIN DESCRIPTION - ORIENTATION: ORIENTATION: RIGHT;LOWER

## 2025-06-16 ASSESSMENT — PAIN DESCRIPTION - DESCRIPTORS: DESCRIPTORS: ACHING;DISCOMFORT;SHARP;TENDER

## 2025-06-16 NOTE — ED PROVIDER NOTES
Provider with the findings documented in the ED course or MDM.    Interpretation per the Radiologist below, if available at the time of this note:    CT ABDOMEN PELVIS W IV CONTRAST Additional Contrast? None   Final Result   1. Normal appendix.   2. Hazy density, mild within the central small bowel mesentery.  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   3. No evidence of intestinal obstruction or acute inflammation.   4. Status post cholecystectomy.   5. Evidence of prior posterior metallic fusion of L3 through L5. Hardware   appears intact.           CT ABDOMEN PELVIS W IV CONTRAST Additional Contrast? None  Result Date: 6/16/2025  EXAMINATION: CT OF THE ABDOMEN AND PELVIS WITH CONTRAST 6/16/2025 3:49 pm TECHNIQUE: CT of the abdomen and pelvis was performed with the administration of intravenous contrast. Multiplanar reformatted images are provided for review. Automated exposure control, iterative reconstruction, and/or weight based adjustment of the mA/kV was utilized to reduce the radiation dose to as low as reasonably achievable. COMPARISON: None. HISTORY: ORDERING SYSTEM PROVIDED HISTORY: RLQ abd pain, nausea TECHNOLOGIST PROVIDED HISTORY: Reason for exam:->RLQ abd pain, nausea Additional Contrast?->None Decision Support Exception - unselect if not a suspected or confirmed emergency medical condition->Emergency Medical Condition (MA) FINDINGS: Lower Chest:  Visualized portion of the lower chest demonstrates no acute abnormality. Organs: Liver and spleen are normal in size without focal lesion.  There clips in the gallbladder fossa.  Common bile duct is normal.  Normal pancreas.  Normal adrenal glands and kidneys. GI/Bowel: Minimal hazy density is noted in the central small bowel mesentery, no pathologic adenopathy.  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  Normal appendix.  No evidence of intestinal obstruction or acute inflammation. Pelvis: No free pelvic fluid.  Urinary bladder is normal. Peritoneum/Retroperitoneum: No

## 2025-06-16 NOTE — DISCHARGE INSTRUCTIONS
Please return to the ER for any new or worsening symptoms  If prescribed, please be sure to  your prescriptions from the pharmacy  Please follow-up with Primary care provider as instructed    CT ABDOMEN PELVIS W IV CONTRAST Additional Contrast? None   Final Result   1. Normal appendix.   2. Hazy density, mild within the central small bowel mesentery.  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   3. No evidence of intestinal obstruction or acute inflammation.   4. Status post cholecystectomy.   5. Evidence of prior posterior metallic fusion of L3 through L5. Hardware   appears intact.

## 2025-06-17 LAB
MICROORGANISM SPEC CULT: ABNORMAL
SPECIMEN DESCRIPTION: ABNORMAL

## 2025-06-20 ASSESSMENT — ENCOUNTER SYMPTOMS
COUGH: 0
VOMITING: 0
CONSTIPATION: 0
NAUSEA: 1
BLOOD IN STOOL: 0
ABDOMINAL PAIN: 1
DIARRHEA: 1
SHORTNESS OF BREATH: 0

## 2025-07-18 ASSESSMENT — RHEUMATOLOGY NEW PATIENT QUESTIONNAIRE
MEMORY LOSS: Y
EASILY LOSING TEMPER: N
DEPRESSION: N
SWOLLEN LEGS OR FEET: Y
EXCESSIVE HAIR LOSS (MORE THAN YOUR NORM): N
MORNING STIFFNESS IN LOWER BACK: Y
MORNING STIFFNESS: Y
COLOR CHANGES OF HANDS OR FEET IN THE COLD: N
SHORTNESS OF BREATH: N
UNUSUALLY RAPID OR SLOWED HEART RATE: N
VAGINAL DRYNESS: Y
UNUSUAL BLEEDING: N
FAINTING: N
DRYNESS OF MOUTH: N
BLACK STOOLS: N
AGITATION: N
HEARTBURN OR REFLUX: N
SEIZURES: N
NUMBNESS OR TINGLING IN HANDS OR FEET: Y
PERSISTENT DIARRHEA: N
SUN SENSITIVE (SUN ALLERGY): N
PAIN OR BURNING ON URINATION: N
JOINT SWELLING: Y
NAUSEA: Y
BLOOD IN STOOLS: N
JOINT PAIN: Y
DOUBLE OR BLURRED VISION: Y
MUSCLE WEAKNESS: Y
DIFFICULTY BREATHING LYING DOWN: N
EYE PAIN: Y
SKIN REDNESS: N
SWOLLEN OR TENDER GLANDS: N
LIST JOINTS AFFECTED BY SWELLING IN THE PAST MONTH: LEGS/FEET
JAUNDICE: N
NODULES/BUMPS: N
RASH: N
INCREASED SUSCEPTIBILITY TO INFECTION: N
HOW WOULD YOU DESCRIBE YOUR STIFFNESS ON AVERAGE: MODERATE
ANXIETY: N
LOSS OF CONSCIOUSNESS: N
BEHAVIORAL CHANGES: N
NIGHT SWEATS: Y
CHEST PAIN: N
DIFFICULTY FALLING ASLEEP: N
HOARSE VOICE: N
DIFFICULTY SWALLOWING: N
UNEXPLAINED HEARING LOSS: N
COUGH: N
SORES IN MOUTH OR NOSE: N
HEADACHES: Y
SKIN TIGHTNESS: N
ANEMIA: N
EASY BRUISING: Y
ABNORMAL URINE: N
DIFFICULTY STAYING ASLEEP: Y
RASH OR ULCERS: N
STOMACH PAIN: Y

## 2025-07-22 ENCOUNTER — HOSPITAL ENCOUNTER (OUTPATIENT)
Dept: RADIOLOGY | Facility: CLINIC | Age: 60
Discharge: HOME | End: 2025-07-22
Payer: COMMERCIAL

## 2025-07-22 ENCOUNTER — APPOINTMENT (OUTPATIENT)
Dept: RHEUMATOLOGY | Facility: CLINIC | Age: 60
End: 2025-07-22
Payer: COMMERCIAL

## 2025-07-22 VITALS
DIASTOLIC BLOOD PRESSURE: 65 MMHG | TEMPERATURE: 98.1 F | OXYGEN SATURATION: 97 % | SYSTOLIC BLOOD PRESSURE: 101 MMHG | HEART RATE: 69 BPM

## 2025-07-22 DIAGNOSIS — M25.50 ARTHRALGIA, UNSPECIFIED JOINT: Primary | ICD-10-CM

## 2025-07-22 DIAGNOSIS — R76.8 POSITIVE ANA (ANTINUCLEAR ANTIBODY): ICD-10-CM

## 2025-07-22 DIAGNOSIS — M13.0 POLYARTHRITIS: ICD-10-CM

## 2025-07-22 PROCEDURE — 73130 X-RAY EXAM OF HAND: CPT | Mod: BILATERAL PROCEDURE | Performed by: RADIOLOGY

## 2025-07-22 PROCEDURE — 73521 X-RAY EXAM HIPS BI 2 VIEWS: CPT | Mod: BILATERAL PROCEDURE | Performed by: RADIOLOGY

## 2025-07-22 PROCEDURE — 73521 X-RAY EXAM HIPS BI 2 VIEWS: CPT

## 2025-07-22 PROCEDURE — 1036F TOBACCO NON-USER: CPT

## 2025-07-22 PROCEDURE — 99204 OFFICE O/P NEW MOD 45 MIN: CPT

## 2025-07-22 PROCEDURE — 73120 X-RAY EXAM OF HAND: CPT | Mod: 50

## 2025-07-22 NOTE — PROGRESS NOTES
I saw and evaluated the patient. I personally obtained the key and critical portions of the history and physical exam or was physically present for key and critical portions performed by the resident/fellow. I reviewed the resident/fellow's documentation and discussed the patient with the resident/fellow. I agree with the resident/fellow's medical decision making as documented in the note.     Pramod Scott MD  Division of Rheumatology  [facility]

## 2025-07-22 NOTE — PROGRESS NOTES
"Rheumatology Note      Patient Ioana Mckeon  MRN [id number]     CC: Ms. Ioana Mckeon is a 60 y.o. female with history of leukopenia, lumbar osteoarthritis s/p spinal fusion 2/2025, hyperlipidemia, asthma, remote history of ovarian cancer s/p hysterectomy, s/p bilateral knee replacements referred for evaluation of rheumatoid arthritis.    Subjective    Subjective   History of Presenting Illness  Patient evaluated by hematology (last seen 5/2025) for leukopenia, lymphopenia with negative hematologic work up and BMB completed. There was concen for rheumatologic abnormality due to extensive arthritis and felt that leukopenia could be medication or autoimmune related,    - Pain of of the lateral pain after lunch, much worse after work. Pain in anterior left leg all of the time, sharp pain while walking. Improves when placing pressure on it. Pain in both feet and thumbs. Pain in hips started 2014 (after back surgery for Schwannoma), left leg pain since lumbar surgery 2021, thumb pain more recently  - Daily pain in back and hips, left thigh. Depends on activity. Worse at night, keeps her from falling asleep. Wakes up a couple hours after sleeping due to pain. Takes 2 Tylenol at night, and another 2 during day if pain is bad at work. Has not done any physical therapy. +swelling in left foot constant, + swelling in left hand (difficult to take rings off) about 2x per week    ROS:  Constitutional: Denies unintentional weight loss, +night sweats  Eyes: Denies dry eyes, blurry vision, redness of the eyes, pain in the eyes or H/O uveitis  ENT: Denies dry mouth, sores in the mouth, difficulty swallowing. +dry mouth, water helps (30-40 oz daily) and chews gum  Cardiovascular: Denies chest pain, but notes \"deep pain once in a while\"  Respiratory: Denies shortness of breath  Gastrointestinal: Denies diarrhea or blood in the stool  Integumentary: Denies photosensitivity, rash or lesions, Raynaud's or psoriasis  Neurological: " "+tingling in left leg, both feet, left arm  Hematologic/Lymphatic: Denies history of blood clots  MSK: As per HPI.     Social history: 2 beers every Tuesday, no smoking. Works at [employer]  Family history: Sister with rheumatoid arthritis, mother possibly with arthritis    Medical History[1]     RX Allergies[2]     Objective   Objective     /65 (BP Location: Left arm, Patient Position: Sitting, BP Cuff Size: Adult)   Pulse 69   Temp 36.7 °C (98.1 °F) (Temporal)   SpO2 97%      PHYSICAL EXAM:  General - NAD, pleasant, AAOx3  Head: Normocephalic, atraumatic  Eyes - PERRLA, EOMI. No conjunctiva injection.   Mouth/ENT - Moist oral and nasal mucosa. No facial rash.   Skin - No rashes or ulcers. Significant varicose veins especially of left lower extremity    Musculoskeletal  Shoulders: Full ROM, without pain, no swelling, warmth or tenderness.  Elbows: Full ROM, without pain, no swelling, warmth or tenderness.  Wrists/Hands: Full ROM, without pain, no swelling, warmth or tenderness. A few tender MCPs without swelling or warmth  Hands : 5/5.    Hips: Full ROM with left anterior hip pain with SYED, lateral right hip pain with SYED  Knees:  Full ROM, without pain, no swelling, warmth or point tenderness.   Ankles: Full ROM, without pain, swelling, warmth or tenderness. +swelling of left foot compared to right  Cervical spine: No tenderness or limitation of movement  Lumbar spine: No tenderness or limitation of movement     LABS:  Lab Results   Component Value Date    WBC 2.6 (L) 04/17/2025    WBC 2.6 (L) 04/17/2025    HGB 11.4 (L) 04/17/2025    HGB 11.4 (L) 04/17/2025    HCT 34.4 (L) 04/17/2025    HCT 34.4 (L) 04/17/2025    MCV 88 04/17/2025    MCV 88 04/17/2025     04/17/2025     04/17/2025      Lab Results   Component Value Date    GLUCOSE 81 01/13/2025    CO2 28 01/13/2025    BUN 16 01/13/2025    EGFR 70 01/13/2025    CALCIUM 8.9 01/13/2025      No results found for: \"CRP\"  Lab Results " "  Component Value Date    SEDRATE 11 08/25/2023     No results found for: \"C3\", \"C4\"  No results found for: \"RF\", \"CITAB\"  Lab Results   Component Value Date    ANATITER 1:80 03/26/2025    ARNP 0.6 03/26/2025    ASMRN <0.2 03/26/2025    ASSA 0.4 03/26/2025    ASSB <0.2 03/26/2025    ASCL <0.2 03/26/2025    JO1 <0.2 03/26/2025    ACHR <0.2 03/26/2025    ACEN <0.2 03/26/2025    DNADS 1.0 03/26/2025     Lab Results   Component Value Date    PROTUR NEGATIVE 05/20/2021    GLUCOSEU NEGATIVE 05/20/2021    BLOODU NEGATIVE 05/20/2021    KETONESU NEGATIVE 05/20/2021    NITRITEU NEGATIVE 05/20/2021    LEUKOCYTESU NEGATIVE 05/20/2021     No results found for: \"UTPCR\"     No results found for: \"URICACID\"  No results found for: \"COLORFL\", \"CLARITYFLUID\", \"WBCFL\", \"NEUTROBFREL\", \"LYMPHSBFREL\", \"EOSBFREL\", \"BASOBFREL\", \"PLASMACFLD\", \"RBCFL\", \"CRYSFL\"    IMAGING:  XR lumbar spine 2/26/2025:  Posterior spinal fusion with bilateral pedicle screws and rods  extending from L3 through L5. There is a interbody spacer at L4/5.  Alignment is maintained. Vertebral body heights are preserved.  Laminectomy changes demonstrated L4 on L5. Surgical staples in the  right upper quadrant. Mild bilateral SI joint osteoarthritis.    MR lumbar spine 11/2024:  Posterior spinal fusion L4-L5 with apparent laminectomy and  intervening discectomy with spacing device. Evaluation is at least  minimally limited due to lack of intravenous contrast and due to  susceptibility artifact from spinal hardware. Small central  herniation superimposed upon disc bulge minimally indents the ventral  thecal sac at T11-12 on limited examination of this region and disc  bulge minimally indents the ventral thecal sac at T12-L1. Conus  medullaris terminates appropriately at approximately the L1-2 level.  T12-L1:  There is no significant central canal or neural foraminal  stenosis.  L1-2:  There is no significant central canal or neural foraminal  stenosis.  L2-3:  There is no " significant central canal or neural foraminal  stenosis.  L3-4:  Disc bulge minimally indents the ventral thecal sac and  minimally to moderately narrows the neuroforamina in combination with  hypertrophic facet changes.  L4-5:  There is no significant central canal or neural foraminal  stenosis.  L5-S1:  There is no significant central canal or neural foraminal  stenosis.      IMPRESSION:  Postoperative changes of the lumbar spine L4-5 at both with residual  degenerative changes most prominently at L3-4.    XR left hip + pelvis, left foot 11/2021:  Pelvis and left hip: No fracture or dislocation.  No hip or SI joint   arthropathy.  Normal soft tissues.  L4-5 lumbar fusion noted.     Left foot: Plantar and Achilles heel spurs are present.  Mild osteoarthritis   at the MTP joint of the great toe.  No fracture or dislocation.     DEXA 11/2021:  LEFT HIP:   The bone mineral density in the total hip is measured at 1.156 g/cm2 corresponding to a T-score of 1.2 and a Z-score of 1.5.  This is within the normal range by WHO criteria.  The bone mineral density of the femoral neck is measured at 1.068 g/cm2 corresponding to a T-score of 0.2 and a Z-score of 0.9.  This is within the normal range by WHO criteria.     RIGHT HIP:   The bone mineral density in the total hip is measured at 1.059 g/cm2 corresponding to a T-score of 0.4 and a Z-score of 0.7.  This is within the normal range by WHO criteria.   The bone mineral density of the femoral neck is measured at 0.982 g/cm2 corresponding to a T-score of -0.4 and a Z-score of 0.3.  This is within the normal range by WHO criteria.     FOREARM:   The bone mineral density of the middle third of the radius of the distal forearm equals 0.985 g/cm2.  The T-score and a Z-score are 0.1 and 0.9.  This is within the normal range by WHO criteria.        Assessment/Plan   Assessment & Plan   Ms. Ioana Mckeon is a 60 y.o. female with history of leukopenia, lumbar osteoarthritis s/p spinal  fusion 2/2025, hyperlipidemia, asthma, remote history of ovarian cancer s/p hysterectomy, s/p bilateral knee replacements referred for evaluation of rheumatoid arthritis.    Pain in low back, bilateral hips, left thigh, both thumbs, both feet. Symptoms most consistent with mechanical joint pain, osteoarthritis - dependent on activity, worse at night, +gelling phenomena. No significant swelling or inflammatory component, with no synovitis on physical exam today. Will obtain further work up as noted below for thorough evaluation to rule out other causes.    Labs reviewed:  - 3/2025: TINA 1:80 with negative NAOMI panel. Leukopenia since 2021     #Polyarthalgias  - ESR, CRP, RF, CCP. Will check C3, C4, UA, UPCR in setting of low titer +TINA 1:80 with leukopenia although rheumatologic ROS largely unremarkable with signs/symptoms of SLE  - XR bilateral hand and bilateral hip for further evaluation    RTC depending on results of above work up    Case seen and discussed with Dr. Scott  Signature: Karina Gutierrez,   Date: July 22, 2025          [1]   Past Medical History:  Diagnosis Date    A-fib (Multi)     (isolated event ) ~10yrs ago    Arthritis     Asthma     Chronic pain disorder     GERD (gastroesophageal reflux disease)     Hiatal hernia     Hyperlipidemia     Joint pain     Low back pain     Nephrolithiasis     Osteoarthritis     Ovarian cancer (Multi) 2007    Personal history of other diseases of the respiratory system     History of bronchitis    Personal history of other endocrine, nutritional and metabolic disease     History of hypercholesterolemia    Plantar fasciitis     PONV (postoperative nausea and vomiting)     Sciatica 01/04/2014    Spinal stenosis     Varicose veins of left lower extremity with pain    [2]   Allergies  Allergen Reactions    Penicillins Other    Sulfa (Sulfonamide Antibiotics) Hives    Tylox [Oxycodone-Acetaminophen] Hives

## 2025-07-23 LAB
APPEARANCE UR: CLEAR
BILIRUB UR QL STRIP: NEGATIVE
C3 SERPL-MCNC: 160 MG/DL (ref 83–193)
C4 SERPL-MCNC: 23 MG/DL (ref 15–57)
CCP IGG SERPL-ACNC: <16 UNITS
COLOR UR: YELLOW
CRP SERPL-MCNC: 3.1 MG/L
ERYTHROCYTE [SEDIMENTATION RATE] IN BLOOD BY WESTERGREN METHOD: 11 MM/H
GLUCOSE UR QL STRIP: NEGATIVE
HGB UR QL STRIP: NEGATIVE
KETONES UR QL STRIP: NEGATIVE
LEUKOCYTE ESTERASE UR QL STRIP: NEGATIVE
NITRITE UR QL STRIP: NEGATIVE
PH UR STRIP: 6 [PH] (ref 5–8)
PROT UR QL STRIP: NEGATIVE
RHEUMATOID FACT SERPL-ACNC: <10 IU/ML
SP GR UR STRIP: 1 (ref 1–1.03)

## (undated) DEVICE — TIP, SUCTION, FRAZIER, 8 FR

## (undated) DEVICE — EVACUATOR, WOUND, CLOSED, 3 SPRING, 400 CC, Y CONNECTING TUBE

## (undated) DEVICE — TIP, SUCTION, FRAZIER, W/CONTROL VENT, 12 FR

## (undated) DEVICE — LEGEND, LUB DIFFUSER PACK

## (undated) DEVICE — Device

## (undated) DEVICE — DRESSING, GAUZE, WASHED FLUFF, LARGE, STERILE

## (undated) DEVICE — SPONGE, HEMOSTATIC, GELATIN, SURGIFOAM, 8 X 12.5 CM X 10 MM

## (undated) DEVICE — DRESSING, ADHESIVE, ISLAND, TELFA, 2 X 3.75 IN, LF

## (undated) DEVICE — BONE, MILL, MIDAS REX, DUAL BLADE, ELECTRIC

## (undated) DEVICE — CATHETER TRAY, SURESTEP, 16FR, URINE METER W/STATLOCK

## (undated) DEVICE — TOOL, MR8 BALL, 5MM DIAMETER

## (undated) DEVICE — DRESSING, NON-ADHERENT, OIL EMULSION, CURITY, 3 X 8 IN, STERILE

## (undated) DEVICE — TOOL, MR8 9CM BALL, 3MM DIAMETER

## (undated) DEVICE — SUTURE, VICRYL, 2-0, 27 IN, FSL, UNDYED

## (undated) DEVICE — STRIP, SKIN CLOSURE, STERI STRIP, REINFORCED, 0.5 X 4 IN

## (undated) DEVICE — SPONGE, GAUZE, XRAY DECT, 16 PLY, 4 X 4, W/MASTER DMT,STERILE

## (undated) DEVICE — MANIFOLD, 4 PORT NEPTUNE STANDARD

## (undated) DEVICE — COVER, TABLE, UHC

## (undated) DEVICE — SPHERE, STEALTHSTATION, 5-PK

## (undated) DEVICE — COVER, CART, 45 X 27 X 48 IN, CLEAR

## (undated) DEVICE — SUTURE, ETHILON, 2-0, FSLX 30, BLACK

## (undated) DEVICE — SUTURE, VICRYL, 1, 27 IN, CT-1, VIOLET

## (undated) DEVICE — DRAIN, WOUND, ROUND, W/TROCAR, HOLE PATTERN, 10 IN, MEDIUM/LARGE, 3/16 X 49 IN

## (undated) DEVICE — FLOSEAL, MATRIX, HEMOSTATIC, FULL STERILE PREP, 5ML

## (undated) DEVICE — KIT, PATIENT CARE, JACKSON TABLE W/PRONE-SAFE HEADREST

## (undated) DEVICE — COVER, TABLE, 44 X 75 IN, DISPOSABLE, LF, STERILE

## (undated) DEVICE — SUTURE, VICRYL, 4-0, 18 IN, UNDYED BR PS-2

## (undated) DEVICE — TIP, SUCTION, FRAZIER, W/CONTROL VENT, 10 FR

## (undated) DEVICE — DRAPE, SHEET, FAN FOLDED, HALF, 44 X 58 IN, DISPOSABLE, LF, STERILE